# Patient Record
Sex: MALE | Race: WHITE | NOT HISPANIC OR LATINO | Employment: OTHER | URBAN - NONMETROPOLITAN AREA
[De-identification: names, ages, dates, MRNs, and addresses within clinical notes are randomized per-mention and may not be internally consistent; named-entity substitution may affect disease eponyms.]

---

## 2022-04-19 DIAGNOSIS — R13.14 PHARYNGOESOPHAGEAL DYSPHAGIA: Primary | ICD-10-CM

## 2022-04-19 DIAGNOSIS — Z12.11 ENCOUNTER FOR COLONOSCOPY IN PATIENT WITH FAMILY HISTORY OF COLON CANCER: ICD-10-CM

## 2022-04-19 DIAGNOSIS — Z80.0 ENCOUNTER FOR COLONOSCOPY IN PATIENT WITH FAMILY HISTORY OF COLON CANCER: ICD-10-CM

## 2022-04-19 RX ORDER — SODIUM CHLORIDE 9 MG/ML
INJECTION, SOLUTION INTRAVENOUS CONTINUOUS
Status: CANCELLED | OUTPATIENT
Start: 2022-04-19

## 2022-04-19 RX ORDER — LIDOCAINE HYDROCHLORIDE 10 MG/ML
1 INJECTION, SOLUTION EPIDURAL; INFILTRATION; INTRACAUDAL; PERINEURAL ONCE
Status: CANCELLED | OUTPATIENT
Start: 2022-04-19 | End: 2022-04-19

## 2022-04-19 RX ORDER — SODIUM CHLORIDE 0.9 % (FLUSH) 0.9 %
10 SYRINGE (ML) INJECTION
Status: CANCELLED | OUTPATIENT
Start: 2022-04-19

## 2022-04-21 ENCOUNTER — HOSPITAL ENCOUNTER (OUTPATIENT)
Dept: RADIOLOGY | Facility: HOSPITAL | Age: 62
Discharge: HOME | End: 2022-04-21
Attending: SURGERY
Payer: COMMERCIAL

## 2022-04-21 DIAGNOSIS — R13.10 DYSPHAGIA: Primary | ICD-10-CM

## 2022-04-21 DIAGNOSIS — R13.10 DYSPHAGIA: ICD-10-CM

## 2022-04-21 PROCEDURE — A9698 NON-RAD CONTRAST MATERIALNOC: HCPCS | Performed by: SURGERY

## 2022-04-21 PROCEDURE — 74220 X-RAY XM ESOPHAGUS 1CNTRST: CPT | Mod: TC

## 2022-04-21 PROCEDURE — 25500020 PHARM REV CODE 255: Performed by: SURGERY

## 2022-04-21 RX ADMIN — BARIUM SULFATE 176 G: 960 POWDER, FOR SUSPENSION ORAL at 09:04

## 2022-04-21 RX ADMIN — BARIUM SULFATE 135 ML: 980 POWDER, FOR SUSPENSION ORAL at 09:04

## 2022-04-27 NOTE — DISCHARGE INSTRUCTIONS
BEFORE THE PROCEDURE:    REPORT ANY CHANGE IN YOUR PHYSICAL CONDITION TO YOUR DOCTOR IMMEDIATELY.  SELF ISOLATE AND CHECK TEMPERATURE DAILY, IF TEMP OVER 100, CALL PHYSICIAN IMMEDIATELY.  TRY TO REFRAIN FROM SMOKING AND ALCOHOL 72 HOURS BEFORE YOUR PROCEDURE.   DO NOT EAT OR DRINK ANYTHING AFTER MIDNIGHT THE NIGHT BEFORE YOUR PROCEDURE.  NO MAKE UP, NAIL POLISH OR JEWELRY.      SOMEONE WILL CALL YOU THE DAY BEFORE YOUR PROCEDURE WITH A CHECK-IN TIME FOR YOUR PROCEDURE.    DAY OF YOUR PROCEDURE:    TAKE BLOOD PRESSURE MEDICATIONS THE MORNING OF YOUR PROCEDURE, WITH SMALL SIPS WATER, AS DIRECTED BY YOUR PHYSICIAN.   DO NOT TAKE ANY DIABETIC MEDICATIONS UNLESS DIRECTED TO DO SO BY YOUR PHYSICIAN.   CONTACT LENSES AND DENTURES MUST BE REMOVED.  A RESPONSIBLE ADULT MUST ACCOMPANY YOU HOME UPON DISCHARGE.   ONLY 1 VISITOR ALLOWED PER ROOM.     Covid testing Wednesday may 4, 2022 between 8-11 a.m.    YOUR THOUGHTS AND OPINIONS HELP US TO BETTER SERVE YOU.     PLEASE PARTICIPATE IN SURVEYS ABOUT YOUR CARE.    THANK YOU FOR CHOOSING [facility].

## 2022-04-28 ENCOUNTER — HOSPITAL ENCOUNTER (OUTPATIENT)
Dept: PREADMISSION TESTING | Facility: HOSPITAL | Age: 62
Discharge: HOME | End: 2022-04-28
Attending: SURGERY
Payer: COMMERCIAL

## 2022-04-28 ENCOUNTER — LAB VISIT (OUTPATIENT)
Dept: LAB | Facility: HOSPITAL | Age: 62
End: 2022-04-28
Attending: SURGERY
Payer: COMMERCIAL

## 2022-04-28 ENCOUNTER — HOSPITAL ENCOUNTER (OUTPATIENT)
Dept: PULMONOLOGY | Facility: HOSPITAL | Age: 62
Discharge: HOME | End: 2022-04-28
Attending: SURGERY
Payer: COMMERCIAL

## 2022-04-28 VITALS — WEIGHT: 290 LBS | HEIGHT: 73 IN | BODY MASS INDEX: 38.43 KG/M2

## 2022-04-28 DIAGNOSIS — Z80.0 ENCOUNTER FOR COLONOSCOPY IN PATIENT WITH FAMILY HISTORY OF COLON CANCER: ICD-10-CM

## 2022-04-28 DIAGNOSIS — Z12.11 ENCOUNTER FOR COLONOSCOPY IN PATIENT WITH FAMILY HISTORY OF COLON CANCER: ICD-10-CM

## 2022-04-28 DIAGNOSIS — R13.14 PHARYNGOESOPHAGEAL DYSPHAGIA: ICD-10-CM

## 2022-04-28 DIAGNOSIS — Z01.818 PRE-OP TESTING: ICD-10-CM

## 2022-04-28 LAB
ALBUMIN SERPL BCP-MCNC: 3.2 G/DL (ref 3.5–5.2)
ALP SERPL-CCNC: 92 U/L (ref 55–135)
ALT SERPL W/O P-5'-P-CCNC: 25 U/L (ref 10–44)
ANION GAP SERPL CALC-SCNC: 8 MMOL/L (ref 8–16)
AST SERPL-CCNC: 22 U/L (ref 10–40)
BASOPHILS # BLD AUTO: 0.06 K/UL (ref 0–0.2)
BASOPHILS NFR BLD: 0.8 % (ref 0–1.9)
BILIRUB SERPL-MCNC: 0.5 MG/DL (ref 0.1–1)
BUN SERPL-MCNC: 10 MG/DL (ref 8–23)
CALCIUM SERPL-MCNC: 9.2 MG/DL (ref 8.7–10.5)
CHLORIDE SERPL-SCNC: 103 MMOL/L (ref 95–110)
CO2 SERPL-SCNC: 28 MMOL/L (ref 23–29)
CREAT SERPL-MCNC: 0.9 MG/DL (ref 0.5–1.4)
DIFFERENTIAL METHOD: NORMAL
EOSINOPHIL # BLD AUTO: 0.4 K/UL (ref 0–0.5)
EOSINOPHIL NFR BLD: 5.1 % (ref 0–8)
ERYTHROCYTE [DISTWIDTH] IN BLOOD BY AUTOMATED COUNT: 13.2 % (ref 11.5–14.5)
EST. GFR  (AFRICAN AMERICAN): >60 ML/MIN/1.73 M^2
EST. GFR  (NON AFRICAN AMERICAN): >60 ML/MIN/1.73 M^2
GLUCOSE SERPL-MCNC: 185 MG/DL (ref 70–110)
HCT VFR BLD AUTO: 41.9 % (ref 40–54)
HGB BLD-MCNC: 14.1 G/DL (ref 14–18)
IMM GRANULOCYTES # BLD AUTO: 0.02 K/UL (ref 0–0.04)
IMM GRANULOCYTES NFR BLD AUTO: 0.3 % (ref 0–0.5)
LYMPHOCYTES # BLD AUTO: 3.2 K/UL (ref 1–4.8)
LYMPHOCYTES NFR BLD: 41.1 % (ref 18–48)
MCH RBC QN AUTO: 30.1 PG (ref 27–31)
MCHC RBC AUTO-ENTMCNC: 33.7 G/DL (ref 32–36)
MCV RBC AUTO: 90 FL (ref 82–98)
MONOCYTES # BLD AUTO: 0.7 K/UL (ref 0.3–1)
MONOCYTES NFR BLD: 8.5 % (ref 4–15)
NEUTROPHILS # BLD AUTO: 3.4 K/UL (ref 1.8–7.7)
NEUTROPHILS NFR BLD: 44.2 % (ref 38–73)
NRBC BLD-RTO: 0 /100 WBC
PLATELET # BLD AUTO: 257 K/UL (ref 150–450)
PMV BLD AUTO: 9.4 FL (ref 9.2–12.9)
POTASSIUM SERPL-SCNC: 3.9 MMOL/L (ref 3.5–5.1)
PROT SERPL-MCNC: 7.4 G/DL (ref 6–8.4)
RBC # BLD AUTO: 4.68 M/UL (ref 4.6–6.2)
SODIUM SERPL-SCNC: 139 MMOL/L (ref 136–145)
WBC # BLD AUTO: 7.69 K/UL (ref 3.9–12.7)

## 2022-04-28 PROCEDURE — 36415 COLL VENOUS BLD VENIPUNCTURE: CPT | Performed by: SURGERY

## 2022-04-28 PROCEDURE — 85025 COMPLETE CBC W/AUTO DIFF WBC: CPT | Performed by: SURGERY

## 2022-04-28 PROCEDURE — 93010 ELECTROCARDIOGRAM REPORT: CPT | Mod: ,,, | Performed by: INTERNAL MEDICINE

## 2022-04-28 PROCEDURE — 93010 EKG 12-LEAD: ICD-10-PCS | Mod: ,,, | Performed by: INTERNAL MEDICINE

## 2022-04-28 PROCEDURE — 93005 ELECTROCARDIOGRAM TRACING: CPT

## 2022-04-28 PROCEDURE — 80053 COMPREHEN METABOLIC PANEL: CPT | Performed by: SURGERY

## 2022-04-28 RX ORDER — LEVOTHYROXINE SODIUM 100 UG/1
TABLET ORAL
COMMUNITY
Start: 2022-04-19 | End: 2023-01-23 | Stop reason: SDUPTHER

## 2022-04-28 RX ORDER — ZOLPIDEM TARTRATE 10 MG/1
10 TABLET ORAL NIGHTLY
COMMUNITY
Start: 2022-04-25 | End: 2023-08-31

## 2022-04-28 RX ORDER — DESOXIMETASONE 2.5 MG/G
CREAM TOPICAL
COMMUNITY
Start: 2021-11-16 | End: 2023-06-05

## 2022-04-28 RX ORDER — POTASSIUM CHLORIDE 750 MG/1
TABLET, EXTENDED RELEASE ORAL
COMMUNITY
Start: 2022-04-19 | End: 2023-03-15 | Stop reason: SDUPTHER

## 2022-04-28 RX ORDER — METFORMIN HYDROCHLORIDE 1000 MG/1
1000 TABLET ORAL 2 TIMES DAILY
COMMUNITY
Start: 2022-04-19 | End: 2023-03-06 | Stop reason: SDUPTHER

## 2022-04-28 RX ORDER — FLUOXETINE HYDROCHLORIDE 20 MG/1
CAPSULE ORAL
COMMUNITY
Start: 2022-04-19 | End: 2023-04-25 | Stop reason: SDUPTHER

## 2022-04-28 RX ORDER — ERGOCALCIFEROL (VITAMIN D2) 10 MCG
25 TABLET ORAL
Status: ON HOLD | COMMUNITY
End: 2022-05-05 | Stop reason: SDUPTHER

## 2022-04-28 RX ORDER — LISINOPRIL AND HYDROCHLOROTHIAZIDE 20; 25 MG/1; MG/1
TABLET ORAL
COMMUNITY
Start: 2022-04-19 | End: 2023-04-25 | Stop reason: SDUPTHER

## 2022-04-28 RX ORDER — ASPIRIN 81 MG/1
81 TABLET ORAL DAILY
COMMUNITY
End: 2023-06-05

## 2022-04-28 RX ORDER — PIOGLITAZONEHYDROCHLORIDE 15 MG/1
TABLET ORAL
COMMUNITY
Start: 2022-04-19 | End: 2023-04-17 | Stop reason: HOSPADM

## 2022-04-28 RX ORDER — ORAL SEMAGLUTIDE 3 MG/1
3 TABLET ORAL
Status: ON HOLD | COMMUNITY
End: 2022-05-05 | Stop reason: CLARIF

## 2022-04-28 RX ORDER — OMEPRAZOLE 40 MG/1
CAPSULE, DELAYED RELEASE ORAL
COMMUNITY
Start: 2022-04-19 | End: 2022-05-05

## 2022-04-28 RX ORDER — FUROSEMIDE 20 MG/1
TABLET ORAL
COMMUNITY
Start: 2022-04-19 | End: 2023-01-16

## 2022-04-28 RX ORDER — GLIMEPIRIDE 4 MG/1
4 TABLET ORAL DAILY
COMMUNITY
Start: 2022-04-19 | End: 2023-03-06 | Stop reason: SDUPTHER

## 2022-04-28 RX ORDER — AMLODIPINE BESYLATE 5 MG/1
TABLET ORAL
COMMUNITY
Start: 2022-04-19 | End: 2023-04-17

## 2022-05-01 PROBLEM — R13.10 DYSPHAGIA: Chronic | Status: ACTIVE | Noted: 2022-04-01

## 2022-05-01 NOTE — H&P
"[place] - Endoscopy  General Surgery  History & Physical    Patient Name: Eren Page  MRN: [id number]  Admission Date: (Not on file)  Attending Physician: Huan Newsome MD   Primary Care Provider: Marilee Merlos MD    Patient information was obtained from patient and past medical records.     Subjective:     Chief Complaint/Reason for Admission:  Dysphagia and longstanding GERD plus family history of colon cancer for screening colonoscopy    History of Present Illness:  Patient is a 62 y.o. male who originally was because of dysphagia and a longstanding history of GERD.  An esophagram was done that showed mild laryngeal penetration of the contrast but no aspiration.  The patient is now admitted for an EGD for evaluation of the esophagus.  He also has a family history of colon cancer and is due for a colonoscopy.  The last colonoscopy he had was 6 years ago.  It was benign.    No current facility-administered medications on file prior to encounter.     No current outpatient medications on file prior to encounter.       Review of patient's allergies indicates:  No Known Allergies    Past Medical History:   Diagnosis Date    Claudication     Diabetes mellitus     Dizziness     Dysphagia 04/2022    Family history of colon cancer     DAD    History of chest pain     Hypertension     Palpitations     Sleep apnea     CPAP USED    SOB (shortness of breath)     Thyroid disease     Wears dentures     FULL     Past Surgical History:   Procedure Laterality Date    APPENDECTOMY      BACK SURGERY      "20 YRS AGO-LOWER BACK" 2 RODS AND 20 SCREWS     CHOLECYSTOTOMY      TONSILLECTOMY      TOTAL KNEE ARTHROPLASTY Bilateral     VASECTOMY       Family History     Problem Relation (Age of Onset)    Cancer Sister, Sister    Cervical cancer Mother    Colon cancer Father    Heart attack Father (82)    Heart disease Mother (76)    No Known Problems Brother, Brother, Brother, Brother, Brother        Tobacco Use    " Smoking status: Never Smoker    Smokeless tobacco: Never Used   Substance and Sexual Activity    Alcohol use: Yes     Comment: OCC    Drug use: Never    Sexual activity: Not on file     Review of Systems   Gastrointestinal: Negative for abdominal distention, abdominal pain and anal bleeding.        Complains of dysphagia   All other systems reviewed and are negative.    Objective:     Vital Signs (Most Recent):    Vital Signs (24h Range):           There is no height or weight on file to calculate BMI.    Physical Exam  Constitutional:       Appearance: Normal appearance.   HENT:      Head: Normocephalic.      Nose: Nose normal.      Mouth/Throat:      Mouth: Mucous membranes are moist.   Eyes:      Extraocular Movements: Extraocular movements intact.   Cardiovascular:      Rate and Rhythm: Normal rate and regular rhythm.   Pulmonary:      Effort: Pulmonary effort is normal.      Breath sounds: Normal breath sounds.   Abdominal:      General: Abdomen is flat. There is no distension.      Palpations: Abdomen is soft. There is no mass.      Tenderness: There is no abdominal tenderness.   Musculoskeletal:         General: Normal range of motion.      Cervical back: Neck supple.   Lymphadenopathy:      Cervical: No cervical adenopathy.   Skin:     General: Skin is warm and dry.      Coloration: Skin is not jaundiced.   Neurological:      General: No focal deficit present.      Mental Status: He is alert and oriented to person, place, and time.   Psychiatric:         Mood and Affect: Mood normal.         Behavior: Behavior normal.         Significant Labs:  I have reviewed all pertinent lab results within the past 24 hours.      Significant Diagnostics:  I have reviewed all pertinent imaging results/findings within the past 24 hours.    Assessment/Plan:     Active Diagnoses:    Diagnosis Date Noted POA    PRINCIPAL PROBLEM:  Family history of colon cancer [Z80.0]  Not Applicable    Dysphagia [R13.10] 04/2022 Yes      Chronic      Problems Resolved During this Admission:     VTE Risk Mitigation (From admission, onward)    None          Huan Newsome MD  General Surgery  [facility]

## 2022-05-03 ENCOUNTER — ANESTHESIA EVENT (OUTPATIENT)
Dept: ENDOSCOPY | Facility: HOSPITAL | Age: 62
End: 2022-05-03
Payer: COMMERCIAL

## 2022-05-03 NOTE — ANESTHESIA PREPROCEDURE EVALUATION
05/03/2022  Eren Page is a 62 y.o., male.      Pre-op Assessment    I have reviewed the Patient Summary Reports.    I have reviewed the NPO Status.   I have reviewed the Medications.     Review of Systems  Anesthesia Hx:  No problems with previous Anesthesia  Denies Family Hx of Anesthesia complications.   Denies Personal Hx of Anesthesia complications.   Social:  Non-Smoker    Cardiovascular:   Hypertension, well controlled ECG has been reviewed. CHEERAN NOTE   Pulmonary:   Shortness of breath Sleep Apnea, CPAP    Education provided regarding risk of obstructive sleep apnea     Renal/:  Renal/ Normal     Hepatic/GI:  Hepatic/GI Normal    Neurological:  Neurology Normal    Endocrine:   Diabetes, well controlled, type 2      Lab Results   Component Value Date    WBC 7.69 04/28/2022    HGB 14.1 04/28/2022    HCT 41.9 04/28/2022    MCV 90 04/28/2022     04/28/2022     CMP  Sodium   Date Value Ref Range Status   04/28/2022 139 136 - 145 mmol/L Final     Potassium   Date Value Ref Range Status   04/28/2022 3.9 3.5 - 5.1 mmol/L Final     Chloride   Date Value Ref Range Status   04/28/2022 103 95 - 110 mmol/L Final     CO2   Date Value Ref Range Status   04/28/2022 28 23 - 29 mmol/L Final     Glucose   Date Value Ref Range Status   04/28/2022 185 (H) 70 - 110 mg/dL Final     BUN   Date Value Ref Range Status   04/28/2022 10 8 - 23 mg/dL Final     Creatinine   Date Value Ref Range Status   04/28/2022 0.9 0.5 - 1.4 mg/dL Final     Calcium   Date Value Ref Range Status   04/28/2022 9.2 8.7 - 10.5 mg/dL Final     Total Protein   Date Value Ref Range Status   04/28/2022 7.4 6.0 - 8.4 g/dL Final     Albumin   Date Value Ref Range Status   04/28/2022 3.2 (L) 3.5 - 5.2 g/dL Final     Total Bilirubin   Date Value Ref Range Status   04/28/2022 0.5 0.1 - 1.0 mg/dL Final     Comment:     For infants and newborns,  interpretation of results should be based  on gestational age, weight and in agreement with clinical  observations.    Premature Infant recommended reference ranges:  Up to 24 hours.............<8.0 mg/dL  Up to 48 hours............<12.0 mg/dL  3-5 days..................<15.0 mg/dL  6-29 days.................<15.0 mg/dL    For patients on Eltrombopag therapy, use of Dimension [place] TBIL is   not   recommended.       Alkaline Phosphatase   Date Value Ref Range Status   04/28/2022 92 55 - 135 U/L Final     AST   Date Value Ref Range Status   04/28/2022 22 10 - 40 U/L Final     ALT   Date Value Ref Range Status   04/28/2022 25 10 - 44 U/L Final     Anion Gap   Date Value Ref Range Status   04/28/2022 8 8 - 16 mmol/L Final     eGFR if    Date Value Ref Range Status   04/28/2022 >60.0 >60 mL/min/1.73 m^2 Final     eGFR if non    Date Value Ref Range Status   04/28/2022 >60.0 >60 mL/min/1.73 m^2 Final     Comment:     Calculation used to obtain the estimated glomerular filtration  rate (eGFR) is the CKD-EPI equation.          Physical Exam  General: Well nourished    Airway:  Mallampati: III / III  Mouth Opening: Normal  TM Distance: Normal  Tongue: Normal  Neck ROM: Normal ROM    Dental:  Partial Dentures    Chest/Lungs:  Clear to auscultation    Heart:  Rate: Normal  Rhythm: Regular Rhythm  Sounds: Normal        Anesthesia Plan  Type of Anesthesia, risks & benefits discussed:    Anesthesia Type: MAC  Intra-op Monitoring Plan: Standard ASA Monitors  Post Op Pain Control Plan: multimodal analgesia  Induction:  IV  Airway Plan: Direct  Informed Consent: Informed consent signed with the Patient and all parties understand the risks and agree with anesthesia plan.  All questions answered.   ASA Score: 4  Day of Surgery Review of History & Physical: I have interviewed and examined the patient. I have reviewed the patient's H&P dated: There are no significant changes.     Ready For Surgery From  Anesthesia Perspective.     .

## 2022-05-04 ENCOUNTER — LAB VISIT (OUTPATIENT)
Dept: LAB | Facility: HOSPITAL | Age: 62
End: 2022-05-04
Attending: SURGERY
Payer: COMMERCIAL

## 2022-05-04 DIAGNOSIS — Z01.818 PRE-OP TESTING: ICD-10-CM

## 2022-05-04 LAB — SARS-COV-2 RNA RESP QL NAA+PROBE: NOT DETECTED

## 2022-05-04 PROCEDURE — U0002 COVID-19 LAB TEST NON-CDC: HCPCS | Performed by: SURGERY

## 2022-05-05 ENCOUNTER — ANESTHESIA (OUTPATIENT)
Dept: ENDOSCOPY | Facility: HOSPITAL | Age: 62
End: 2022-05-05
Payer: COMMERCIAL

## 2022-05-05 ENCOUNTER — HOSPITAL ENCOUNTER (OUTPATIENT)
Facility: HOSPITAL | Age: 62
Discharge: HOME | End: 2022-05-05
Attending: SURGERY | Admitting: SURGERY
Payer: COMMERCIAL

## 2022-05-05 DIAGNOSIS — Z80.0 FAMILY HISTORY OF COLON CANCER: Primary | ICD-10-CM

## 2022-05-05 DIAGNOSIS — K29.00 ACUTE SUPERFICIAL GASTRITIS WITHOUT HEMORRHAGE: ICD-10-CM

## 2022-05-05 DIAGNOSIS — D12.4 ADENOMATOUS POLYP OF DESCENDING COLON: ICD-10-CM

## 2022-05-05 DIAGNOSIS — R13.14 PHARYNGOESOPHAGEAL DYSPHAGIA: ICD-10-CM

## 2022-05-05 PROCEDURE — 25000003 PHARM REV CODE 250: Performed by: SURGERY

## 2022-05-05 PROCEDURE — 87081 CULTURE SCREEN ONLY: CPT | Performed by: SURGERY

## 2022-05-05 PROCEDURE — 37000008 HC ANESTHESIA 1ST 15 MINUTES: Performed by: SURGERY

## 2022-05-05 PROCEDURE — 37000009 HC ANESTHESIA EA ADD 15 MINS: Performed by: SURGERY

## 2022-05-05 PROCEDURE — 45385 COLONOSCOPY W/LESION REMOVAL: CPT | Mod: PT | Performed by: SURGERY

## 2022-05-05 PROCEDURE — 43239 EGD BIOPSY SINGLE/MULTIPLE: CPT | Performed by: SURGERY

## 2022-05-05 PROCEDURE — 27201423 OPTIME MED/SURG SUP & DEVICES STERILE SUPPLY: Performed by: SURGERY

## 2022-05-05 RX ORDER — ERGOCALCIFEROL (VITAMIN D2) 10 MCG
10 TABLET ORAL DAILY
Qty: 30 TABLET | Refills: 12 | Status: SHIPPED | OUTPATIENT
Start: 2022-05-05 | End: 2023-06-12

## 2022-05-05 RX ORDER — DEXTROMETHORPHAN/PSEUDOEPHED 2.5-7.5/.8
DROPS ORAL
Status: COMPLETED | OUTPATIENT
Start: 2022-05-05 | End: 2022-05-05

## 2022-05-05 RX ORDER — LIDOCAINE HYDROCHLORIDE 10 MG/ML
INJECTION, SOLUTION INTRAVENOUS
Status: DISCONTINUED | OUTPATIENT
Start: 2022-05-05 | End: 2022-05-05

## 2022-05-05 RX ORDER — SODIUM CHLORIDE 9 MG/ML
INJECTION, SOLUTION INTRAVENOUS CONTINUOUS
Status: DISCONTINUED | OUTPATIENT
Start: 2022-05-05 | End: 2022-05-05 | Stop reason: HOSPADM

## 2022-05-05 RX ORDER — PROPOFOL 10 MG/ML
VIAL (ML) INTRAVENOUS
Status: DISCONTINUED | OUTPATIENT
Start: 2022-05-05 | End: 2022-05-05

## 2022-05-05 RX ORDER — LIDOCAINE HYDROCHLORIDE 10 MG/ML
1 INJECTION, SOLUTION EPIDURAL; INFILTRATION; INTRACAUDAL; PERINEURAL ONCE
Status: DISCONTINUED | OUTPATIENT
Start: 2022-05-05 | End: 2022-05-05 | Stop reason: HOSPADM

## 2022-05-05 RX ORDER — PANTOPRAZOLE SODIUM 40 MG/1
40 TABLET, DELAYED RELEASE ORAL 2 TIMES DAILY
Qty: 60 TABLET | Refills: 11 | Status: SHIPPED | OUTPATIENT
Start: 2022-05-05 | End: 2023-12-04 | Stop reason: SDUPTHER

## 2022-05-05 RX ORDER — SODIUM CHLORIDE 0.9 % (FLUSH) 0.9 %
10 SYRINGE (ML) INJECTION
Status: DISCONTINUED | OUTPATIENT
Start: 2022-05-05 | End: 2022-05-05 | Stop reason: HOSPADM

## 2022-05-05 RX ADMIN — Medication 40 MG: at 08:05

## 2022-05-05 RX ADMIN — Medication 30 MG: at 08:05

## 2022-05-05 RX ADMIN — Medication 50 MG: at 08:05

## 2022-05-05 RX ADMIN — TOPICAL ANESTHETIC 2 EACH: 200 SPRAY DENTAL; PERIODONTAL at 07:05

## 2022-05-05 RX ADMIN — Medication 120 MG: at 08:05

## 2022-05-05 RX ADMIN — SIMETHICONE 80 MG: 20 SUSPENSION/ DROPS ORAL at 08:05

## 2022-05-05 RX ADMIN — SODIUM CHLORIDE: 0.9 INJECTION, SOLUTION INTRAVENOUS at 06:05

## 2022-05-05 RX ADMIN — LIDOCAINE HYDROCHLORIDE 50 MG: 10 INJECTION, SOLUTION INTRAVENOUS at 08:05

## 2022-05-05 NOTE — PLAN OF CARE
RECEIVED PT TO ROOM 521 ACCOMPANIED BY MARSHAL HUERTA, SNACK SERVED. WILL AT BEDSIDE. CALL WITH NEEDS.

## 2022-05-05 NOTE — OP NOTE
[facility] - Endoscopy  Colonoscopy Procedure  Operative Note    SUMMARY     Date of Procedure: 5/5/2022     Procedure: Procedure(s) (LRB):  COLONOSCOPY, WITH POLYPECTOMY USING SNARE (N/A)  EGD, WITH CLOSED BIOPSY (N/A)    Surgeon(s) and Role:     * Huan Newsome MD - Primary    Assisting Surgeon: None     Patient location: PACU    Pre-Operative Diagnosis: Pharyngoesophageal dysphagia [R13.14]  Encounter for colonoscopy in patient with family history of colon cancer [Z12.11, Z80.0]    Post-Operative Diagnosis: Post-Op Diagnosis Codes:     * Pharyngoesophageal dysphagia [R13.14]     * Encounter for colonoscopy in patient with family history of colon cancer [Z12.11, Z80.0]     * Acute gastritis without hemorrhage [K29.00]     * Polyp of descending colon [K63.5]     Indications:  Screening colonoscopy with family history of colon cancer and pharyngoesophageal dysphagia     Anesthesia:  Mac        Procedure:                  Once the patient was turned around, digital rectal exam was performed.  Under direct visualization the colonoscope was introduced through the anus in to the rectum.  The scope was then advanced to the terminal ileum.  Scope was then withdrawn and the mucosa was carefully examined in a circular fashion.  The entire colonic mucosa was examined, including the rectum with retroflexion.  Air was evacuated from the colon and the procedure was terminated.  The patient tolerated the procedure well and was able to be transferred to the recovery area in stable condition.    Findings:                 Digital rectal examination:  Rectal exam showed normal sphincter tone with no masses palpated.  There was no blood on the glove.                      Rectum:  Rectal mucosa appeared normal                    Sigmoid:  Sigmoid colon showed no abnormalities        Descending:  In the descending colon at 45 cm, small polyp was identified and removed via snare cautery.         Transverse:  Transverse colon was normal          Ascending:  Ascending colon showed no abnormalities        Cecum:  The cecum mucosa appeared unremarkable.  Ileocecal valve and appendiceal orifice were normal.  We entered into the terminal ileum.        Terminal Ileum:  Terminal ileum mucosa was normal     Specimens:   Specimens (From admission, onward)    None            Estimated Blood Loss (EBL): * No values recorded between 5/5/2022 12:00 AM and 5/5/2022  8:59 AM *     Complications: No     Diagnostic Impression:  1. Pharyngo esophageal dysphagia 2. Acute gastritis without bleeding 3. Screening colonoscopy due to family history of colon cancer 4. Polyp at 45 cm in the descending colon      Recommendations: Discharge patient to home.    Disposition: PACU - hemodynamically stable.     Attestation: I performed the procedure.        Follow Up:             No future appointments.        Huan Newsome MD  5/5/2022

## 2022-05-05 NOTE — DISCHARGE SUMMARY
[facility] - Endoscopy  Discharge Note  Short Stay    Procedure(s) (LRB):  COLONOSCOPY, WITH POLYPECTOMY USING SNARE (N/A)  EGD, WITH CLOSED BIOPSY (N/A)    OUTCOME: Patient tolerated treatment/procedure well without complication and is now ready for discharge.    DISPOSITION: Home or Self Care    FINAL DIAGNOSIS:  1.  Family history of colon cancer 2. pharyngoesophageal dysphagia 3. Acute gastritis without hemorrhage 4. Colon polyp in descending colon at 45 cm    FOLLOWUP: Patient is to follow up in clinic in 2 weeks    DISCHARGE INSTRUCTIONS:    Discharge Procedure Orders   Diet Adult Regular   Order Comments: As preop     Notify your health care provider if you experience any of the following:  temperature >100.4     Notify your health care provider if you experience any of the following:  persistent nausea and vomiting or diarrhea     Notify your health care provider if you experience any of the following:  severe uncontrolled pain     Activity as tolerated         Clinical Reference Documents Added to Patient Instructions       Document    COLON POLYPECTOMY DISCHARGE INSTRUCTIONS (ENGLISH)    GASTRITIS DISCHARGE INSTRUCTIONS (ENGLISH)          TIME SPENT ON DISCHARGE:  15 minutes

## 2022-05-05 NOTE — OP NOTE
[facility] - Endoscopy  EGD Procedure  Operative Note    SUMMARY     Date of Procedure: 5/5/2022     Procedure: Procedure(s) (LRB):  COLONOSCOPY, WITH POLYPECTOMY USING SNARE (N/A)  EGD, WITH CLOSED BIOPSY (N/A)    Surgeon(s) and Role:     * Huan Newsome MD - Primary    Assisting Surgeon: None    Patient location: PACU    Pre-Operative Diagnosis: Pharyngoesophageal dysphagia [R13.14]  Encounter for colonoscopy in patient with family history of colon cancer [Z12.11, Z80.0]    Post-Operative Diagnosis: Post-Op Diagnosis Codes:     * Pharyngoesophageal dysphagia [R13.14]     * Encounter for colonoscopy in patient with family history of colon cancer [Z12.11, Z80.0]     * Acute gastritis without hemorrhage [K29.00]     * Polyp of descending colon [K63.5]     Indications: Pharyngoesophageal dysphagia with normal esophagram     ASA Score: IV    Procedure:                  The patient was brought in to the endoscopy suite where the risks, benefits, and alternatives of the procedure were described.  The patient was given the opportunity to ask questions and then signed informed consent. Patient was positioned in the left lateral decubitus position, continuous monitoring was initiated, and supplemental oxygen was provided via nasal cannula.  Bite block was placed.  Adequate sedation was achieved with the above mentioned medications and then titrated during the entire procedure.  Under direct visualization the gastroscope was introduced through the oropharynx in to the esophagus.  The scope was then advanced in to the stomach and second portion of the duodenum.  Scope was then withdrawn and the mucosa was carefully examined.  The entire gastric mucosa was examined, including the fundus with retroflexion.  Air was evacuated from the stomach and the scope was withdrawn in to the esophagus.  The entire esophageal mucosa was examined.  Once the scope was removed and the patient was turned around and we proceeded with the  colonoscopy.    Findings:                 Esophagus:  Upon insertion of the scope the cords were seen and they appeared unremarkable.  Going down the esophagus no abnormalities were seen.  The Z-line was sharply delineated at 45 cm.                      Stomach:  Upon entering the stomach we identified severe diffuse gastritis from the incisura down to the pylorus.  There was no evidence of ulcerations or erosions.  A retroflexed view of the cardia showed no proximal gastric abnormalities.  The scope was then straightened now and gastric biopsy of the antrum was done for H pylori using cold got biopsy forceps.                    Duodenum:  The duodenal mucosa appeared unremarkable.     Specimens:   Specimen (24h ago, onward)             Start     Ordered    05/05/22 0853  Specimen to Pathology, Surgery Gastrointestinal tract  Once        Comments: Pre-op Diagnosis: Pharyngoesophageal dysphagia [R13.14]Encounter for colonoscopy in patient with family history of colon cancer [Z12.11, Z80.0]Procedure(s):COLONOSCOPY, WITH POLYPECTOMY USING SNAREEGD, WITH CLOSED BIOPSY Number of specimens: 1Name of specimens: 1)   POLYP AT 45 CM  @ 0849     References:    Click here for ordering Quick Tip   Question Answer Comment   Procedure Type: Gastrointestinal tract    Specimen Class: Complex case/Special    Which provider would you like to cc? HUAN NEWSOME    Release to patient Immediate        05/05/22 0857                  Estimated Blood Loss (EBL): * No values recorded between 5/5/2022 12:00 AM and 5/5/2022  8:55 AM *     Complications: No     Diagnostic Impression:  1. Pharyngoesophageal dysphagia 2. Acute severe gastritis without hemorrhage      Recommendations: Discharge patient to home.    Disposition: PACU - hemodynamically stable.     Attestation: I performed the procedure.        Follow Up:             No future appointments.        Huan Newsome MD  5/5/2022

## 2022-05-05 NOTE — DISCHARGE INSTRUCTIONS
FOLLOW UP WITH DR BARBA IN 2 WEEKS.  *APPOINTMENT MAY 19th @ 2:30pm    NO DRIVING OR DRINKING ALCOHOL FOR 24 HOURS.    CALL DR BARBA'S OFFICE FOR ANY QUESTIONS OR CONCERNS.  REPORT TO THE ER IF URGENT.    THANK YOU FOR CHOOSING [facility]!

## 2022-05-05 NOTE — ANESTHESIA POSTPROCEDURE EVALUATION
Anesthesia Post Evaluation    Patient: Eren Page    Procedure(s) Performed: Procedure(s) (LRB):  COLONOSCOPY, WITH POLYPECTOMY USING SNARE (N/A)  EGD, WITH CLOSED BIOPSY (N/A)    Final Anesthesia Type: MAC      Patient location during evaluation: OPS  Patient participation: Yes- Able to Participate  Level of consciousness: awake  Post-procedure vital signs: reviewed and stable  Pain management: adequate  Airway patency: patent    PONV status at discharge: No PONV  Anesthetic complications: no      Cardiovascular status: blood pressure returned to baseline  Respiratory status: spontaneous ventilation  Hydration status: euvolemic  Follow-up not needed.          Vitals Value Taken Time   /68 05/05/22 0931   Temp 36.4 °C (97.5 °F) 05/05/22 0931   Pulse 62 05/05/22 0931   Resp 20 05/05/22 0931   SpO2 98 % 05/05/22 0931         No case tracking events are documented in the log.      Pain/Sharifa Score: Sharifa Score: 10 (5/5/2022  9:31 AM)

## 2022-05-05 NOTE — TRANSFER OF CARE
Anesthesia Transfer of Care Note    Patient: Eren Page    Procedure(s) Performed: Procedure(s) (LRB):  COLONOSCOPY, WITH POLYPECTOMY USING SNARE (N/A)  EGD, WITH CLOSED BIOPSY (N/A)    Patient location: GI    Anesthesia Type: MAC    Transport from OR: Transported from OR on room air with adequate spontaneous ventilation    Post pain: adequate analgesia    Post assessment: no apparent anesthetic complications    Post vital signs: stable    Level of consciousness: awake    Nausea/Vomiting: no nausea/vomiting    Complications: none    Transfer of care protocol was followed      Last vitals:   /64  HR 79  RR 16  SPO2 99  T 36.7

## 2022-05-06 VITALS
DIASTOLIC BLOOD PRESSURE: 68 MMHG | TEMPERATURE: 98 F | HEART RATE: 62 BPM | RESPIRATION RATE: 20 BRPM | SYSTOLIC BLOOD PRESSURE: 133 MMHG | OXYGEN SATURATION: 98 %

## 2022-05-06 LAB — HELICOBACTER, RAPID UREA: NEGATIVE

## 2022-05-09 LAB — POCT GLUCOSE: 201 MG/DL (ref 70–110)

## 2022-05-12 LAB — SPECIMEN TO PATHOLOGY - SURGICAL: NORMAL

## 2023-04-19 DIAGNOSIS — M54.16 LUMBAR RADICULOPATHY: Primary | ICD-10-CM

## 2023-04-21 ENCOUNTER — TELEPHONE (OUTPATIENT)
Dept: NEUROSURGERY | Facility: CLINIC | Age: 63
End: 2023-04-21
Payer: COMMERCIAL

## 2023-04-21 NOTE — TELEPHONE ENCOUNTER
There was an error on my part between this patient and another patient with the same last name as well as other similarities in needs of care; I'm typing the correct message here for proper documentation. I'm advising Nadia that this pt will be a new pt that Dr. Barclay came and talked to me about; I already uploaded one MRI Dr. Barclay brought me that the pt's daughter had given to him for review. Dr. Barclay would like pt to get an MRI lumbar w/o, XR cervical and XR lumbar both with flex & ext, but due to patient's insurance we cannot order the MRI due to pt not having been seen yet by our clinic. Nadia, can you please contact either the patient or patient's PCP in regards to getting those images ordered by a PCP or another physician he currently is seeing that knows what his symptoms are and then let me know once we have those images for Dr. Barclay to review. Thanks!    This is the original message that should've been documented into patient's chart.

## 2023-04-21 NOTE — TELEPHONE ENCOUNTER
Received referral. Called patient to inquire about any recent imaging he has had. He stated he did have xrays and a MRI done this year. States his daughter gave it to Dr. Barclay to review. I spoke with Valeriano, his daughter. She states she did give the discs/reports to Dr. Barclay to review. She is in surgery right now and will check with Dr. Barclay. I got with sepideh to see if she knew anything about this and Dr. Barclay did give her images to put in. Importing now.

## 2023-04-21 NOTE — TELEPHONE ENCOUNTER
"Spoke with patient. He is C/O lower back pain that radiates down to his right hip, into his knees. He states he has "dead spots" in his left leg. He has no feeling outside of his left leg/bottom of his foot. This began to happen after his second back surgery. He cannot put any weight on that leg. He states it feels like someone is "stabbing him with a knife" in her right hip. This has been going on for about 5-6 months now and is not due to any accidents/falls. The pain does wake him up at night. He cannot lay on his back nor his shoulders so he is constantly moving around, switching positions. States if he cannot find a right position, he gets up in the middle of the night and goes to his recliner. He rates the pain a 9/10 and is taking Norco, a muscle relaxer and NSAID prescribed by Dr. Granados. Patient has had two back surgeries. The first one he cannot recall who performed it and the second one was done in [place] by Dr. Jamaal Bronson but believes he switched practices. I will call Dr. An's office and advise on what AA is recommending.  "

## 2023-04-24 NOTE — TELEPHONE ENCOUNTER
Deisy from Dr. An's office called me back. I advised her that we did receive referral and advised her of what Dr. Barclay was requesting. I told her the MRI lumbar w/o and nba said we can order the xrays so they will see about ordering MRI. Will send myself a reminder to f/u on scheduling.

## 2023-04-25 NOTE — TELEPHONE ENCOUNTER
Looks like MRI is scheduled for today. Sending myself a reminder to f/u on results and send to AA for review

## 2023-05-10 DIAGNOSIS — M54.12 CERVICAL RADICULOPATHY: ICD-10-CM

## 2023-05-10 DIAGNOSIS — M54.16 LUMBAR RADICULOPATHY: Primary | ICD-10-CM

## 2023-05-10 NOTE — TELEPHONE ENCOUNTER
"Per Dr. Barclay:   "He will need L2-3, L3-4 decompression and R L2-3 microdiscectomy." Can you please schedule or let me know where to schedule? Thanks!   "

## 2023-05-15 ENCOUNTER — HOSPITAL ENCOUNTER (OUTPATIENT)
Dept: RADIOLOGY | Facility: HOSPITAL | Age: 63
Discharge: HOME | End: 2023-05-15
Attending: NEUROLOGICAL SURGERY
Payer: COMMERCIAL

## 2023-05-15 ENCOUNTER — OFFICE VISIT (OUTPATIENT)
Dept: NEUROSURGERY | Facility: CLINIC | Age: 63
End: 2023-05-15
Payer: COMMERCIAL

## 2023-05-15 VITALS
DIASTOLIC BLOOD PRESSURE: 77 MMHG | WEIGHT: 302 LBS | HEIGHT: 73 IN | BODY MASS INDEX: 40.02 KG/M2 | RESPIRATION RATE: 16 BRPM | SYSTOLIC BLOOD PRESSURE: 117 MMHG | HEART RATE: 82 BPM

## 2023-05-15 DIAGNOSIS — M48.10 DISH (DIFFUSE IDIOPATHIC SKELETAL HYPEROSTOSIS): Primary | ICD-10-CM

## 2023-05-15 DIAGNOSIS — M54.16 LUMBAR RADICULOPATHY: ICD-10-CM

## 2023-05-15 DIAGNOSIS — M54.12 CERVICAL RADICULOPATHY: ICD-10-CM

## 2023-05-15 DIAGNOSIS — M47.814 SPONDYLOSIS WITHOUT MYELOPATHY OR RADICULOPATHY, THORACIC REGION: ICD-10-CM

## 2023-05-15 DIAGNOSIS — M48.02 SPINAL STENOSIS, CERVICAL REGION: ICD-10-CM

## 2023-05-15 DIAGNOSIS — M47.22 CERVICAL SPONDYLOSIS WITH RADICULOPATHY: ICD-10-CM

## 2023-05-15 PROCEDURE — 3074F PR MOST RECENT SYSTOLIC BLOOD PRESSURE < 130 MM HG: ICD-10-PCS | Mod: CPTII,,, | Performed by: NEUROLOGICAL SURGERY

## 2023-05-15 PROCEDURE — 4010F PR ACE/ARB THEARPY RXD/TAKEN: ICD-10-PCS | Mod: CPTII,,, | Performed by: NEUROLOGICAL SURGERY

## 2023-05-15 PROCEDURE — 3078F PR MOST RECENT DIASTOLIC BLOOD PRESSURE < 80 MM HG: ICD-10-PCS | Mod: CPTII,,, | Performed by: NEUROLOGICAL SURGERY

## 2023-05-15 PROCEDURE — 1160F RVW MEDS BY RX/DR IN RCRD: CPT | Mod: CPTII,,, | Performed by: NEUROLOGICAL SURGERY

## 2023-05-15 PROCEDURE — 3008F PR BODY MASS INDEX (BMI) DOCUMENTED: ICD-10-PCS | Mod: CPTII,,, | Performed by: NEUROLOGICAL SURGERY

## 2023-05-15 PROCEDURE — 99205 PR OFFICE/OUTPT VISIT, NEW, LEVL V, 60-74 MIN: ICD-10-PCS | Mod: ,,, | Performed by: NEUROLOGICAL SURGERY

## 2023-05-15 PROCEDURE — 3288F PR FALLS RISK ASSESSMENT DOCUMENTED: ICD-10-PCS | Mod: CPTII,,, | Performed by: NEUROLOGICAL SURGERY

## 2023-05-15 PROCEDURE — 3008F BODY MASS INDEX DOCD: CPT | Mod: CPTII,,, | Performed by: NEUROLOGICAL SURGERY

## 2023-05-15 PROCEDURE — 4010F ACE/ARB THERAPY RXD/TAKEN: CPT | Mod: CPTII,,, | Performed by: NEUROLOGICAL SURGERY

## 2023-05-15 PROCEDURE — 3288F FALL RISK ASSESSMENT DOCD: CPT | Mod: CPTII,,, | Performed by: NEUROLOGICAL SURGERY

## 2023-05-15 PROCEDURE — 72114 X-RAY EXAM L-S SPINE BENDING: CPT | Mod: TC

## 2023-05-15 PROCEDURE — 1101F PT FALLS ASSESS-DOCD LE1/YR: CPT | Mod: CPTII,,, | Performed by: NEUROLOGICAL SURGERY

## 2023-05-15 PROCEDURE — 1159F MED LIST DOCD IN RCRD: CPT | Mod: CPTII,,, | Performed by: NEUROLOGICAL SURGERY

## 2023-05-15 PROCEDURE — 72052 X-RAY EXAM NECK SPINE 6/>VWS: CPT | Mod: TC

## 2023-05-15 PROCEDURE — 3074F SYST BP LT 130 MM HG: CPT | Mod: CPTII,,, | Performed by: NEUROLOGICAL SURGERY

## 2023-05-15 PROCEDURE — 1101F PR PT FALLS ASSESS DOC 0-1 FALLS W/OUT INJ PAST YR: ICD-10-PCS | Mod: CPTII,,, | Performed by: NEUROLOGICAL SURGERY

## 2023-05-15 PROCEDURE — 1160F PR REVIEW ALL MEDS BY PRESCRIBER/CLIN PHARMACIST DOCUMENTED: ICD-10-PCS | Mod: CPTII,,, | Performed by: NEUROLOGICAL SURGERY

## 2023-05-15 PROCEDURE — 1159F PR MEDICATION LIST DOCUMENTED IN MEDICAL RECORD: ICD-10-PCS | Mod: CPTII,,, | Performed by: NEUROLOGICAL SURGERY

## 2023-05-15 PROCEDURE — 99205 OFFICE O/P NEW HI 60 MIN: CPT | Mod: ,,, | Performed by: NEUROLOGICAL SURGERY

## 2023-05-15 PROCEDURE — 3078F DIAST BP <80 MM HG: CPT | Mod: CPTII,,, | Performed by: NEUROLOGICAL SURGERY

## 2023-05-15 RX ORDER — OXYCODONE AND ACETAMINOPHEN 10; 325 MG/1; MG/1
1 TABLET ORAL
COMMUNITY
End: 2023-06-05

## 2023-05-15 RX ORDER — VIT C/E/ZN/COPPR/LUTEIN/ZEAXAN 250MG-90MG
1000 CAPSULE ORAL DAILY
COMMUNITY
Start: 2023-01-05

## 2023-05-15 RX ORDER — PIOGLITAZONEHYDROCHLORIDE 45 MG/1
45 TABLET ORAL DAILY
COMMUNITY
End: 2023-06-26

## 2023-05-15 NOTE — PROGRESS NOTES
[facility]  Neurosurgery        Eren Page   [id number]   1960       SUBJECTIVE:     CHIEF COMPLAINT:  Lower back pain, neck pain    HPI:  Eren Page is a 63 y.o. male who presents for neurosurgical evaluation.  He complains of constant pain in the lower back.  He has intermittent pain into the right hip, groin, and down the right lateral leg.  He is only able to walk a short distance without pain in the right hip and leg.  He must sit and rest for relief.  He has a pressure sensation in the back that worsens the longer he stands.  He finds that standing is worse than walking.  He has history of L4-5, L5-S1 fusion over 20yrs ago in [place].  He has experienced numbness and weakness in the left lower leg and foot since the surgery.  He began to have recurrent pain about one year after the surgery.  His symptoms have progressively worsened over time, especially the past 5-6 months.  Anti-inflammatories, muscle relaxers, and pain medications are not longer providing much relief.  He gets slight temporary relief with use of a TENS.  He is no longer able to do much due to his symptoms.     He also complains of neck pain, headaches, and pain into bilateral shoulders.  He reports difficulty swallowing, especially in the evenings.  He says he must cut his food into small pieces to keep it from getting stuck.  He had an esophogram years ago and says his esophagus was stretched, which helped for a short time.  A second esophogram was done in [place], but stretching was not performed that time as that was not felt to be the problem.  He has weakness in the left greater than right upper extremities.  He is unable to lift the arm above his head without increased pain in the back.  He has difficulty with balance due to his pain.  He denies disturbances in bowel or bladder function.      Review of patient's allergies indicates:   Allergen Reactions    Adhesive tape-silicones Other (See Comments)     buster  and skin breakdown       Current Outpatient Medications:     amLODIPine (NORVASC) 10 MG tablet, 1 tablet., Disp: , Rfl:     cholecalciferol, vitamin D3, (VITAMIN D3) 25 mcg (1,000 unit) capsule, once daily., Disp: , Rfl:     ergocalciferol, vitamin D2, 10 mcg (400 unit) Tab, Take 10 mcg by mouth once daily., Disp: 30 tablet, Rfl: 12    FLUoxetine 20 MG capsule, Take 1 capsule (20 mg total) by mouth once daily., Disp: 90 capsule, Rfl: 0    GARLIC ORAL, Take 10 mg by mouth., Disp: , Rfl:     HYDROcodone-acetaminophen (NORCO)  mg per tablet, Take 1 tablet by mouth 2 (two) times daily as needed., Disp: , Rfl:     levothyroxine (SYNTHROID) 100 MCG tablet, 1 tablet on an empty stomach in the morning, Disp: 90 tablet, Rfl: 0    lisinopriL-hydrochlorothiazide (PRINZIDE,ZESTORETIC) 20-25 mg Tab, Take 1 tablet by mouth once daily., Disp: 90 tablet, Rfl: 0    meloxicam (MOBIC) 15 MG tablet, Take 15 mg by mouth., Disp: , Rfl:     metFORMIN (GLUCOPHAGE) 1000 MG tablet, Take 1 tablet (1,000 mg total) by mouth 2 (two) times a day. Take one tablet by mouth twice daily., Disp: 180 tablet, Rfl: 1    oxyCODONE-acetaminophen (PERCOCET)  mg per tablet, 1 tablet as needed. 1-2 daily, Disp: , Rfl:     pioglitazone (ACTOS) 45 MG tablet, Take 45 mg by mouth once daily., Disp: , Rfl:     potassium chloride (KLOR-CON) 10 MEQ TbSR, Take 10 mEq by mouth once daily., Disp: , Rfl:     tiZANidine (ZANAFLEX) 4 MG tablet, Take 4 mg by mouth 2 (two) times daily., Disp: , Rfl:     traZODone (DESYREL) 50 MG tablet, Take 1 tablet (50 mg total) by mouth every evening., Disp: 30 tablet, Rfl: 2    zolpidem (AMBIEN) 10 mg Tab, Take 10 mg by mouth nightly., Disp: , Rfl:     aspirin (ECOTRIN) 81 MG EC tablet, Take 81 mg by mouth once daily. STOPPED 04/28/2022, Disp: , Rfl:     cephALEXin (KEFLEX) 500 MG capsule, Take by mouth., Disp: , Rfl:     desoximetasone (TOPICORT) 0.25 % cream, APPLY TO AFFECTED AREA 1 TO 2 TIMES DAILY AS NEEDED, Disp: ,  "Rfl:     glimepiride (AMARYL) 4 MG tablet, Take 1 tablet (4 mg total) by mouth once daily. Take one tablet daily. (Patient not taking: Reported on 5/15/2023), Disp: 90 tablet, Rfl: 1    ibuprofen-famotidine (DUEXIS) 800-26.6 mg Tab, 1 tablet., Disp: , Rfl:     pantoprazole (PROTONIX) 40 MG tablet, Take 1 tablet (40 mg total) by mouth 2 (two) times daily., Disp: 60 tablet, Rfl: 11    rosuvastatin (CRESTOR) 5 MG tablet, Take 1 tablet (5 mg total) by mouth after dinner., Disp: 90 tablet, Rfl: 0    tirzepatide (MOUNJARO) 5 mg/0.5 mL PnIj, Inject 5 mg into the skin every 7 days. (Patient not taking: Reported on 5/15/2023), Disp: 4 pen, Rfl: 1   Past Medical History:   Diagnosis Date    Cervical radiculopathy     Claudication     Diabetes mellitus     Dizziness     Dorsalgia     Dysphagia 04/2022    History of chest pain     Hypertension     Lumbar radiculopathy     Palpitations     Sleep apnea     CPAP USED    SOB (shortness of breath)     Thyroid disease     Wears dentures     FULL     Past Surgical History:   Procedure Laterality Date    APPENDECTOMY      BACK SURGERY      "20 YRS AGO-LOWER BACK" 2 RODS AND 20 SCREWS     CHOLECYSTOTOMY      COLONOSCOPY      TONSILLECTOMY      TOTAL KNEE ARTHROPLASTY Bilateral     VASECTOMY       Family History   Problem Relation Age of Onset    Cervical cancer Mother     Heart disease Mother 76    Heart attack Father 82    Colon cancer Father     Cancer Sister     No Known Problems Brother     Cancer Sister     No Known Problems Brother     No Known Problems Brother     No Known Problems Brother     No Known Problems Brother      Social History     Tobacco Use    Smoking status: Never    Smokeless tobacco: Never   Substance Use Topics    Alcohol use: Yes     Comment: OCC    Drug use: Never        Review of Systems:  Review of Systems   Constitutional:  Negative for chills, fever and weight loss.   HENT:  Negative for ear discharge, ear pain and hearing loss.    Eyes:  Negative for " blurred vision, photophobia and pain.   Respiratory:  Negative for cough and shortness of breath.    Cardiovascular:  Negative for chest pain and palpitations.   Gastrointestinal:  Negative for abdominal pain, nausea and vomiting.   Genitourinary:  Negative for frequency, hematuria and urgency.   Musculoskeletal:  Positive for back pain, joint pain and neck pain.   Skin:  Negative for rash.   Neurological:  Positive for tingling, sensory change, weakness and headaches. Negative for dizziness, seizures and loss of consciousness.   Psychiatric/Behavioral:  Negative for depression, hallucinations and suicidal ideas.             OBJECTIVE:     Vital Signs (Most Recent)  Pulse: 82 (05/15/23 1325)  Resp: 16 (05/15/23 1325)  BP: 117/77 (05/15/23 1325)    Physical Exam:  General:  Pleasant. Well-nourished. Alert. No acute distress.    Head:  Normocephalic, without obvious abnormality, atraumatic    Lungs:  Quiet, non-labored     Neurological:    Oriented to Person, Place, Time   Speech:  normal  Memory, cognition, and affect are appropriate.    Muscle strength against resistance:    Strength  Deltoids Triceps Biceps Wrist Extension Intrinsics Hand    Upper: R 5/5 5/5 5/5 5/5 5/5 5/5    L 5/5 5-/5 5/5 5/5 5/5 5/5     Muscle strength against resistance:    Strength  Hip Flexors Knee extensor Knee Flexion Ankle Dorsiflexion Plantar Flexion EHL Hip Adductor   Lower: R 5/5 5/5 5/5 5/5 5/5 5/5 5/5    L 5/5 5/5 5/5 5-/5 5/5 5/5 5/5       Reflexes:   Right Left   Triceps 2+ 2+   Biceps 2+ 2+   Brachioradialis 1+ 1+   Patellar 0 0   Achilles 0 0     Decreased sensation: left hand (C6, C7, C8), below waist diffusely L>R, left lower leg and foot    Wiseman: negative (both)    Gait:  Stiff, antalgic, and limping    Coordination:  Unable to walk on heels & toes d/t L>R weakness      Musculoskeletal:   Cervical ROM: Pain in extension. Increases swallowing difficulty  SLR is positive on the right at 30 degrees, negative on the  left    MRI of the cervical spine from 07/30/2022 shows severe multilevel spondylosis and severe central stenosis, most prominent at C5-6 greater than C3-4 and C4-5.  There is moderate stenosis at C6-7.  There may be an element of OPLL.  There is no abnormal signal within the cord.  There were large ventral osteophytes from C2 through T2.  Plain x-rays of the cervical spine from 05/15/2023 again show the massive ventral osteophytes extending from just below the anterior tubercle of C1 through all the visualized thoracic vertebrae.  The most dramatic findings are between C2 and C6.  There is solid connection from C3-C4 and C6-C7.  The other ventral osteophytes are not completely bridged.  There is minimal motion with flexion/extension.    MRI of the lumbar spine from 04/25/2023 shows evidence of prior 360 degree instrumented fusion was solid arthrodesis from L4-S1.  Again noted are prominent ventral osteophytes from L1 through L4.  At L1-2 there is a small central and right paracentral disc herniation.  There is severe stenosis at L2-3, partly due to a calcified disc herniation.  There is also severe junctional stenosis at L3-4 there is severe facet arthropathy at this level and lesser findings at L2-3.  Alignment is preserved. There is minimal motion with flexion/extension.  Plain x-rays of the lumbar spine from earlier today show the L4 and S1 pedicle screws and rods as well as the anterior buttress screws and surgical clips.  He has solid fusion from L4-S1.  There is minimal movement throughout the rest of the lumbar spine.    Options were discussed at length with the patient.  He would clearly benefit from both lumbar and cervical decompression.  We talked about extending the lumbar fusion up through L2 through a lateral approach with probable revision of his hardware posteriorly versus a minimally invasive decompression.  His leg symptoms are his biggest complaint right now and he would like to go ahead just  with an MIS decompression.    As far as his cervical spine goes, there significant pros and cons about an anterior approach.  A modified barium swallow.  I think the better approach is going to be posterior, likely a laminoplasty.  I would like to see a CT as well as the above-noted MBS.     We will keep his neck neutral and his MAPS greater than 85 during the lumbar surgery.    ASSESSMENT/PLAN:     1. Lumbar radiculopathy  - Bilateral L2-3, L3-4 decompression, right approach; Keep neck neutral please    2. DISH (diffuse idiopathic skeletal hyperostosis)    3. Cervical spondylosis with radiculopathy    4. Spinal stenosis, cervical region  - CT Cervical Spine Without Contrast; Future  - Fl Modified Barium Swallow Speech; Future  - SLP video swallow; Future    5. Spondylosis without myelopathy or radiculopathy, thoracic region  - MRI Thoracic Spine Without Contrast; Future     We will keep his neck neutral and his MAPS greater than 85 during the lumbar surgery.      I, Dr. Renny Barclay, personally performed the services described in this documentation. All medical record entries made by the scribe, Marguerite Nascimento RN, were at my direction and in my presence.  I have reviewed the chart and agree that the record reflects my personal performance and is accurate and complete. Renny Barclay MD.  1:04 PM 05/15/2023       Renny Barclay MD [facility]

## 2023-05-25 ENCOUNTER — HOSPITAL ENCOUNTER (OUTPATIENT)
Dept: RADIOLOGY | Facility: HOSPITAL | Age: 63
Discharge: HOME | End: 2023-05-25
Attending: NEUROLOGICAL SURGERY
Payer: COMMERCIAL

## 2023-05-25 ENCOUNTER — CLINICAL SUPPORT (OUTPATIENT)
Dept: REHABILITATION | Facility: HOSPITAL | Age: 63
End: 2023-05-25
Attending: NEUROLOGICAL SURGERY
Payer: COMMERCIAL

## 2023-05-25 DIAGNOSIS — M48.02 SPINAL STENOSIS, CERVICAL REGION: ICD-10-CM

## 2023-05-25 DIAGNOSIS — M47.22 CERVICAL SPONDYLOSIS WITH RADICULOPATHY: ICD-10-CM

## 2023-05-25 DIAGNOSIS — M48.10 DISH (DIFFUSE IDIOPATHIC SKELETAL HYPEROSTOSIS): ICD-10-CM

## 2023-05-25 PROCEDURE — 25500020 PHARM REV CODE 255: Performed by: NEUROLOGICAL SURGERY

## 2023-05-25 PROCEDURE — 92611 MOTION FLUOROSCOPY/SWALLOW: CPT

## 2023-05-25 PROCEDURE — 74230 X-RAY XM SWLNG FUNCJ C+: CPT | Mod: TC

## 2023-05-25 PROCEDURE — A9698 NON-RAD CONTRAST MATERIALNOC: HCPCS | Performed by: NEUROLOGICAL SURGERY

## 2023-05-25 RX ADMIN — BARIUM SULFATE 30 ML: 0.81 POWDER, FOR SUSPENSION ORAL at 08:05

## 2023-05-25 NOTE — PROGRESS NOTES
"Speech Language Pathology Department  Modified Barium Swallow Study    Patient Name:  Eren Page   MRN:  [id number]    Recommendations:     General recommendations:   follow up with Dr. Barclay as scheduled and consider GI follow up regarding hx of esophageal dilation  Repeat MBS study: n/a  Diet recommendations: Regular solids and thin liquids  Swallow strategies/precautions: small bites/sips, slow rate, alternate solids/liquids, small/more frequent meals to reduce fatigue, and medications per patient preference  General precautions: Standard      History/Reason for Referral:     Eren Page is a/n 63 y.o. male referred for a Modified Barium Swallow Study to assess the efficiency of his swallow function, rule out aspiration and make recommendations regarding safe dietary consistencies, effective compensatory strategies, and safe eating environment.     Past Medical History:   Diagnosis Date    Cervical radiculopathy     Claudication     Diabetes mellitus     Dizziness     Dorsalgia     Dysphagia 04/2022    History of chest pain     Hypertension     Lumbar radiculopathy     Palpitations     Sleep apnea     CPAP USED    SOB (shortness of breath)     Thyroid disease     Wears dentures     FULL     Past Surgical History:   Procedure Laterality Date    APPENDECTOMY      BACK SURGERY      "20 YRS AGO-LOWER BACK" 2 RODS AND 20 SCREWS     CHOLECYSTOTOMY      COLONOSCOPY      TONSILLECTOMY      TOTAL KNEE ARTHROPLASTY Bilateral     VASECTOMY       Home Diet: Regular and thin liquids  Current Method of Nutrition:  oral intake    Patient complaint: globus sensation with solids greater than liquids, difficulty swallowing pills, difficulty increases at night    Subjective:     Patient awake, alert, and cooperative.    Spiritual/Cultural/Protestant Beliefs/Practices that affect care:  none  Pain/Comfort: no complaints of pain     Respiratory Status: room air    Fluoroscopic Results:     Oral Musculature  Dentition: upper dentures " and lower dentures  Secretion Management: adequate  Mucosal Quality: good  Facial Movement: WFL  Buccal Strength & Mobility: WFL  Mandibular Strength & Mobility: WFL  Oral Labial Strength & Mobility: WFL  Lingual Strength & Mobility: WFL  Velar Elevation: WFL  Vocal Quality: adequate    Positioning:  seated in straight chair  Visualization  Patient was seen in the lateral view  Cervical ossifications as identified on 5/15 CT of the neck visualized    Oral Phase:   Adequate lip closure  Adequate bolus formation  Adequate anterior-posterior transport  Adequate mastication  Adequate bolus cohesion  Loss of bolus control with liquids    Pharyngeal Phase:   Swallow delay with spill to the pyriform sinuses  Adequate base of tongue retraction  Reduced epiglottic deflection due to cervical ossifications  Reduced hyolaryngeal excursion  Reduced airway protection  Consistency Laryngeal Penetration Aspiration Residue   Thin liquid by cup During the swallow  Cleared spontaneously None None   Mildly thick liquid by cup None None None   Puree None None None   Thin liquid by straw None None None   Barium tablet with thin liquid None None None     Cervical Esophageal Phase:   UES appeared to accommodate all bolus types without stasis or retrograde movement visualized    Assessment:     Pt exhibited Mild oropharyngeal dysphagia characterized by the findings noted above.  Laryngeal penetration of thin liquid cleared spontaneously and no aspiration was visualized during this study.   Swallow safety is preserved, however efficiency may be impacted by fatigue .      Patient appears to be at low risk for aspiration related pneumonia when considering good oral health status, overall health/immune status, adequate laryngeal vestibule closure, and severity of dysphagia.      Patient Education:     Patient provided with verbal education regarding MBS results/recommendations.  Understanding was verbalized, however pt appeared disinterested in  changing his routine to reduce fatigue in the evenings.    Plan:     SLP Follow-Up: none warranted    Time Tracking:     SLP Treatment Date:   5/25/23   Speech Start Time:  0835   Speech Stop Time:  0855      Speech Total Time (min):  20 minutes     Billable minutes:   Motion Fluoroscopic Evaluation, Video Recording, 20 minutes     05/25/2023

## 2023-05-30 ENCOUNTER — TELEPHONE (OUTPATIENT)
Dept: NEUROSURGERY | Facility: CLINIC | Age: 63
End: 2023-05-30
Payer: COMMERCIAL

## 2023-05-30 NOTE — TELEPHONE ENCOUNTER
The patient is calling to see if we can send in Norco 10/325mg.  The last script was sent in by Dr. Granados on 3/22/23 for 1 BID prn.  He stated that he would only take 1 at bedtime to try and stretch it, but he ran out at the end of April.  He called their office to get a refill, but he is unable to get a refill until he goes in, but they can not see him soon.  He complains of the same symptoms as his last visit, but they are worsening especially his right hip and leg pain.  He is schedule to preop on 6/5/23 with surgery on 6/15.  Is it ok to prescribe the Norco?  Please advise..SANIA    He uses [email or web address] in [place].  SANIA

## 2023-06-05 ENCOUNTER — HOSPITAL ENCOUNTER (OUTPATIENT)
Dept: RADIOLOGY | Facility: HOSPITAL | Age: 63
Discharge: HOME | End: 2023-06-05
Attending: PHYSICIAN ASSISTANT
Payer: COMMERCIAL

## 2023-06-05 ENCOUNTER — OFFICE VISIT (OUTPATIENT)
Dept: NEUROSURGERY | Facility: CLINIC | Age: 63
End: 2023-06-05
Payer: COMMERCIAL

## 2023-06-05 VITALS
HEIGHT: 73 IN | TEMPERATURE: 98 F | HEART RATE: 68 BPM | RESPIRATION RATE: 16 BRPM | WEIGHT: 311.81 LBS | BODY MASS INDEX: 41.32 KG/M2 | DIASTOLIC BLOOD PRESSURE: 71 MMHG | SYSTOLIC BLOOD PRESSURE: 117 MMHG

## 2023-06-05 DIAGNOSIS — I10 HYPERTENSION, UNSPECIFIED TYPE: ICD-10-CM

## 2023-06-05 DIAGNOSIS — G47.30 SLEEP APNEA, UNSPECIFIED TYPE: ICD-10-CM

## 2023-06-05 DIAGNOSIS — R00.2 PALPITATIONS: ICD-10-CM

## 2023-06-05 DIAGNOSIS — M54.16 LUMBAR RADICULOPATHY: ICD-10-CM

## 2023-06-05 DIAGNOSIS — E11.9 TYPE 2 DIABETES MELLITUS WITHOUT COMPLICATION, WITHOUT LONG-TERM CURRENT USE OF INSULIN: ICD-10-CM

## 2023-06-05 DIAGNOSIS — M48.062 LUMBAR STENOSIS WITH NEUROGENIC CLAUDICATION: Primary | ICD-10-CM

## 2023-06-05 DIAGNOSIS — Z01.818 PREOPERATIVE TESTING: ICD-10-CM

## 2023-06-05 PROCEDURE — 3288F PR FALLS RISK ASSESSMENT DOCUMENTED: ICD-10-PCS | Mod: CPTII,,, | Performed by: PHYSICIAN ASSISTANT

## 2023-06-05 PROCEDURE — 3078F DIAST BP <80 MM HG: CPT | Mod: CPTII,,, | Performed by: PHYSICIAN ASSISTANT

## 2023-06-05 PROCEDURE — 99213 PR OFFICE/OUTPT VISIT, EST, LEVL III, 20-29 MIN: ICD-10-PCS | Mod: ,,, | Performed by: PHYSICIAN ASSISTANT

## 2023-06-05 PROCEDURE — 4010F ACE/ARB THERAPY RXD/TAKEN: CPT | Mod: CPTII,,, | Performed by: PHYSICIAN ASSISTANT

## 2023-06-05 PROCEDURE — 1101F PT FALLS ASSESS-DOCD LE1/YR: CPT | Mod: CPTII,,, | Performed by: PHYSICIAN ASSISTANT

## 2023-06-05 PROCEDURE — 4010F PR ACE/ARB THEARPY RXD/TAKEN: ICD-10-PCS | Mod: CPTII,,, | Performed by: PHYSICIAN ASSISTANT

## 2023-06-05 PROCEDURE — 71046 X-RAY EXAM CHEST 2 VIEWS: CPT | Mod: TC

## 2023-06-05 PROCEDURE — 3288F FALL RISK ASSESSMENT DOCD: CPT | Mod: CPTII,,, | Performed by: PHYSICIAN ASSISTANT

## 2023-06-05 PROCEDURE — 1101F PR PT FALLS ASSESS DOC 0-1 FALLS W/OUT INJ PAST YR: ICD-10-PCS | Mod: CPTII,,, | Performed by: PHYSICIAN ASSISTANT

## 2023-06-05 PROCEDURE — 3074F PR MOST RECENT SYSTOLIC BLOOD PRESSURE < 130 MM HG: ICD-10-PCS | Mod: CPTII,,, | Performed by: PHYSICIAN ASSISTANT

## 2023-06-05 PROCEDURE — 3078F PR MOST RECENT DIASTOLIC BLOOD PRESSURE < 80 MM HG: ICD-10-PCS | Mod: CPTII,,, | Performed by: PHYSICIAN ASSISTANT

## 2023-06-05 PROCEDURE — 3074F SYST BP LT 130 MM HG: CPT | Mod: CPTII,,, | Performed by: PHYSICIAN ASSISTANT

## 2023-06-05 PROCEDURE — 3008F BODY MASS INDEX DOCD: CPT | Mod: CPTII,,, | Performed by: PHYSICIAN ASSISTANT

## 2023-06-05 PROCEDURE — 99213 OFFICE O/P EST LOW 20 MIN: CPT | Mod: ,,, | Performed by: PHYSICIAN ASSISTANT

## 2023-06-05 PROCEDURE — 3008F PR BODY MASS INDEX (BMI) DOCUMENTED: ICD-10-PCS | Mod: CPTII,,, | Performed by: PHYSICIAN ASSISTANT

## 2023-06-05 RX ORDER — CYCLOBENZAPRINE HCL 10 MG
10 TABLET ORAL 3 TIMES DAILY PRN
Qty: 30 TABLET | Refills: 2 | Status: SHIPPED | OUTPATIENT
Start: 2023-06-05 | End: 2023-06-15

## 2023-06-05 RX ORDER — INSULIN GLARGINE 300 U/ML
INJECTION, SOLUTION SUBCUTANEOUS
COMMUNITY
End: 2023-06-05

## 2023-06-05 RX ORDER — NALTREXONE HYDROCHLORIDE AND BUPROPION HYDROCHLORIDE 8; 90 MG/1; MG/1
TABLET, EXTENDED RELEASE ORAL
COMMUNITY
End: 2023-06-05

## 2023-06-05 RX ORDER — HYDROCODONE BITARTRATE AND ACETAMINOPHEN 10; 325 MG/1; MG/1
1 TABLET ORAL EVERY 4 HOURS PRN
Qty: 40 TABLET | Refills: 0 | Status: SHIPPED | OUTPATIENT
Start: 2023-06-05 | End: 2023-07-11

## 2023-06-05 RX ORDER — FUROSEMIDE 20 MG/1
TABLET ORAL
COMMUNITY
End: 2023-06-26

## 2023-06-05 NOTE — H&P (VIEW-ONLY)
"[facility]  History & Physical  Neurosurgery      Eren Page   [id number]   1960       SUBJECTIVE:     CHIEF COMPLAINT:  No chief complaint on file.      HPI:  Eren Page is a 63 y.o. male who presents for neurosurgical evaluation.       Complain of neck pain with HA and pain into bilatearal shoulders.  LBP with pain radiating into the right lateral thigh, hip, lateral LL.  Pain burning aching.  + numbness in bilateral feet/heels.  Drags feet.  Walks about 10-20 paces before has to stop and rest.  Standing worse than walking.    + weakness in both legs.      No bowel or bladder.  + difficulties with balance due to pain.    Past Medical History:   Diagnosis Date    Cervical radiculopathy     Claudication     Depression     Diabetes mellitus     Dizziness     Dorsalgia     Dyslipidemia     Dysphagia 04/2022    History of chest pain     Hypertension     Hypothyroidism     Lumbar radiculopathy     Palpitations     Sleep apnea     CPAP USED    SOB (shortness of breath)     Wears dentures     FULL       Past Surgical History:   Procedure Laterality Date    APPENDECTOMY      BACK SURGERY      "20 YRS AGO-LOWER BACK" 2 RODS AND 20 SCREWS     CHOLECYSTOTOMY      COLONOSCOPY      TONSILLECTOMY      TOTAL KNEE ARTHROPLASTY Bilateral     VASECTOMY         Family History   Problem Relation Age of Onset    Cervical cancer Mother     Heart disease Mother 76    Heart attack Father 82    Colon cancer Father     Cancer Sister     No Known Problems Brother     Cancer Sister     No Known Problems Brother     No Known Problems Brother     No Known Problems Brother     No Known Problems Brother        Social History     Socioeconomic History    Marital status:     Number of children: 4   Tobacco Use    Smoking status: Never    Smokeless tobacco: Never   Substance and Sexual Activity    Alcohol use: Yes     Comment: OCC    Drug use: Never        Review of patient's allergies indicates:   Allergen Reactions    " Adhesive tape-silicones Other (See Comments)     welps and skin breakdown        Current Outpatient Medications   Medication Instructions    amLODIPine (NORVASC) 10 MG tablet 1 tablet    cholecalciferol, vitamin D3, (VITAMIN D3) 25 mcg (1,000 unit) capsule Daily    ergocalciferol (vitamin D2) 10 mcg, Oral, Daily    FLUoxetine 20 mg, Oral, Daily    furosemide (LASIX) 20 MG tablet 1 tablet Orally Once a day    glimepiride (AMARYL) 4 mg, Oral, Daily, Take one tablet daily.    HYDROcodone-acetaminophen (NORCO)  mg per tablet 1 tablet, Oral, 2 times daily PRN    ibuprofen-famotidine (DUEXIS) 800-26.6 mg Tab 1 tablet    levothyroxine (SYNTHROID) 100 MCG tablet 1 tablet on an empty stomach in the morning    lisinopriL-hydrochlorothiazide (PRINZIDE,ZESTORETIC) 20-25 mg Tab 1 tablet, Oral, Daily    meloxicam (MOBIC) 15 mg, Oral    metFORMIN (GLUCOPHAGE) 1,000 mg, Oral, 2 times daily, Take one tablet by mouth twice daily.    naltrexone-bupropion (CONTRAVE) 8-90 mg TbSR 1 tablet in the morning Orally Once a day    pantoprazole (PROTONIX) 40 mg, Oral, 2 times daily    pioglitazone (ACTOS) 45 mg, Oral, Daily    potassium chloride (KLOR-CON) 10 MEQ TbSR 10 mEq, Oral, Daily    rosuvastatin (CRESTOR) 5 mg, Oral, After dinner    semaglutide (OZEMPIC) 0.25 mg, Subcutaneous, Every 7 days    tiZANidine (ZANAFLEX) 4 mg, Oral, 2 times daily    traZODone (DESYREL) 50 mg, Oral, Nightly    zolpidem (AMBIEN) 10 mg, Oral, Nightly        Review of Systems:  Review of Systems   Constitutional:  Negative for chills, fever and weight loss.   HENT:  Negative for ear discharge, ear pain and hearing loss.    Eyes:  Negative for blurred vision, photophobia and pain.   Respiratory:  Negative for cough and shortness of breath.    Cardiovascular:  Negative for chest pain and palpitations.   Gastrointestinal:  Negative for abdominal pain, nausea and vomiting.   Genitourinary:  Negative for frequency, hematuria and urgency.   Musculoskeletal:   "Positive for back pain, joint pain and neck pain.   Skin:  Negative for rash.   Neurological:  Positive for tingling, sensory change, weakness and headaches. Negative for dizziness, seizures and loss of consciousness.   Psychiatric/Behavioral:  Negative for depression, hallucinations and suicidal ideas.        OBJECTIVE:     Physical Examination:    Visit Vitals  /71   Pulse 68   Temp 98.2 °F (36.8 °C)   Resp 16   Ht 6' 1" (1.854 m)   Wt (!) 141.4 kg (311 lb 12.8 oz)   BMI 41.14 kg/m²        General:  Pleasant, Well-nourished, Well-groomed.    CV:  Neck is supple.  There are no carotid bruits.  Heart has regular rate and rhythm.    Lungs:  Lungs are clear to auscultation bilaterally with non-labored respirations.    Abdomen:  Soft, non-tender, non-distended.    Neurological:    Oriented to Person, Place, Time   Speech:  normal  Memory, cognition, and affect are appropriate.     Muscle strength against resistance:     Strength   Deltoids Triceps Biceps Wrist Extension Intrinsics Hand    Upper: R 5/5 5/5 5/5 5/5 5/5 5/5     L 5/5 5-/5 5/5 5/5 5/5 5/5      Muscle strength against resistance:     Strength   Hip Flexors Knee extensor Knee Flexion Ankle Dorsiflexion Plantar Flexion EHL Hip Adductor   Lower: R 5/5 5/5 5/5 5/5 5/5 5/5 5/5     L 5/5 5/5 5/5 5-/5 5/5 5/5 5/5         Reflexes:    Right Left   Triceps 2+ 2+   Biceps 2+ 2+   Brachioradialis 1+ 1+   Patellar 0 0   Achilles 0 0      Decreased sensation: left hand (C6, C7, C8), below waist diffusely L>R, left lower leg and foot     Wiseman: negative (both)     Gait:  Stiff, antalgic, and limping     Coordination:  Unable to walk on heels & toes d/t L>R weakness        Musculoskeletal:   Cervical ROM: Pain in extension. Increases swallowing difficulty  SLR is positive on the right at 30 degrees, negative on the left     Imaging:  MRI of the cervical spine from 07/30/2022 shows severe multilevel spondylosis and severe central stenosis, most prominent at " C5-6 greater than C3-4 and C4-5.  There is moderate stenosis at C6-7.  There may be an element of OPLL.  There is no abnormal signal within the cord.  There were large ventral osteophytes from C2 through T2.  Plain x-rays of the cervical spine from 05/15/2023 again show the massive ventral osteophytes extending from just below the anterior tubercle of C1 through all the visualized thoracic vertebrae.  The most dramatic findings are between C2 and C6.  There is solid connection from C3-C4 and C6-C7.  The other ventral osteophytes are not completely bridged.  There is minimal motion with flexion/extension.     MRI of the lumbar spine from 04/25/2023 shows evidence of prior 360 degree instrumented fusion was solid arthrodesis from L4-S1.  Again noted are prominent ventral osteophytes from L1 through L4.  At L1-2 there is a small central and right paracentral disc herniation.  There is severe stenosis at L2-3, partly due to a calcified disc herniation.  There is also severe junctional stenosis at L3-4 there is severe facet arthropathy at this level and lesser findings at L2-3.  Alignment is preserved. There is minimal motion with flexion/extension.  Plain x-rays of the lumbar spine from earlier today show the L4 and S1 pedicle screws and rods as well as the anterior buttress screws and surgical clips.  He has solid fusion from L4-S1.  There is minimal movement throughout the rest of the lumbar spine.      ASSESSMENT:     1. Lumbar stenosis with neurogenic claudication        2. Lumbar radiculopathy  EKG 12-lead    X-Ray Chest PA And Lateral    Comprehensive Metabolic Panel    CBC Auto Differential    HYDROcodone-acetaminophen (NORCO)  mg per tablet    cyclobenzaprine (FLEXERIL) 10 MG tablet      3. Type 2 diabetes mellitus without complication, without long-term current use of insulin  Comprehensive Metabolic Panel      4. Preoperative testing  EKG 12-lead    X-Ray Chest PA And Lateral    Comprehensive Metabolic  "Panel    CBC Auto Differential      5. Hypertension, unspecified type  EKG 12-lead    X-Ray Chest PA And Lateral      6. Sleep apnea, unspecified type  X-Ray Chest PA And Lateral      7. Palpitations  EKG 12-lead         PLAN:     Per Dr. Barclay  "Options were discussed at length with the patient.  He would clearly benefit from both lumbar and cervical decompression.  We talked about extending the lumbar fusion up through L2 through a lateral approach with probable revision of his hardware posteriorly versus a minimally invasive decompression.  His leg symptoms are his biggest complaint right now and he would like to go ahead just with an MIS decompression.     As far as his cervical spine goes, there significant pros and cons about an anterior approach.  A modified barium swallow.  I think the better approach is going to be posterior, likely a laminoplasty.  I would like to see a CT as well as the above-noted MBS.      We will keep his neck neutral and his MAPS greater than 85 during the lumbar surgery."    All of the patient's and his wife's questions were answered.  Consents were signed at this time.      A total of 37 minutes was spent face-to-face with the patient during this encounter.  Over half of that time was spent on counseling and coordination of care.       Marilee Gonzalez PA-C    "

## 2023-06-05 NOTE — PROGRESS NOTES
"[facility]  History & Physical  Neurosurgery      Eren Page   [id number]   1960       SUBJECTIVE:     CHIEF COMPLAINT:  No chief complaint on file.      HPI:  Eren Page is a 63 y.o. male who presents for neurosurgical evaluation.       Complain of neck pain with HA and pain into bilatearal shoulders.  LBP with pain radiating into the right lateral thigh, hip, lateral LL.  Pain burning aching.  + numbness in bilateral feet/heels.  Drags feet.  Walks about 10-20 paces before has to stop and rest.  Standing worse than walking.    + weakness in both legs.      No bowel or bladder.  + difficulties with balance due to pain.    Past Medical History:   Diagnosis Date    Cervical radiculopathy     Claudication     Depression     Diabetes mellitus     Dizziness     Dorsalgia     Dyslipidemia     Dysphagia 04/2022    History of chest pain     Hypertension     Hypothyroidism     Lumbar radiculopathy     Palpitations     Sleep apnea     CPAP USED    SOB (shortness of breath)     Wears dentures     FULL       Past Surgical History:   Procedure Laterality Date    APPENDECTOMY      BACK SURGERY      "20 YRS AGO-LOWER BACK" 2 RODS AND 20 SCREWS     CHOLECYSTOTOMY      COLONOSCOPY      TONSILLECTOMY      TOTAL KNEE ARTHROPLASTY Bilateral     VASECTOMY         Family History   Problem Relation Age of Onset    Cervical cancer Mother     Heart disease Mother 76    Heart attack Father 82    Colon cancer Father     Cancer Sister     No Known Problems Brother     Cancer Sister     No Known Problems Brother     No Known Problems Brother     No Known Problems Brother     No Known Problems Brother        Social History     Socioeconomic History    Marital status:     Number of children: 4   Tobacco Use    Smoking status: Never    Smokeless tobacco: Never   Substance and Sexual Activity    Alcohol use: Yes     Comment: OCC    Drug use: Never        Review of patient's allergies indicates:   Allergen Reactions    " Adhesive tape-silicones Other (See Comments)     welps and skin breakdown        Current Outpatient Medications   Medication Instructions    amLODIPine (NORVASC) 10 MG tablet 1 tablet    cholecalciferol, vitamin D3, (VITAMIN D3) 25 mcg (1,000 unit) capsule Daily    ergocalciferol (vitamin D2) 10 mcg, Oral, Daily    FLUoxetine 20 mg, Oral, Daily    furosemide (LASIX) 20 MG tablet 1 tablet Orally Once a day    glimepiride (AMARYL) 4 mg, Oral, Daily, Take one tablet daily.    HYDROcodone-acetaminophen (NORCO)  mg per tablet 1 tablet, Oral, 2 times daily PRN    ibuprofen-famotidine (DUEXIS) 800-26.6 mg Tab 1 tablet    levothyroxine (SYNTHROID) 100 MCG tablet 1 tablet on an empty stomach in the morning    lisinopriL-hydrochlorothiazide (PRINZIDE,ZESTORETIC) 20-25 mg Tab 1 tablet, Oral, Daily    meloxicam (MOBIC) 15 mg, Oral    metFORMIN (GLUCOPHAGE) 1,000 mg, Oral, 2 times daily, Take one tablet by mouth twice daily.    naltrexone-bupropion (CONTRAVE) 8-90 mg TbSR 1 tablet in the morning Orally Once a day    pantoprazole (PROTONIX) 40 mg, Oral, 2 times daily    pioglitazone (ACTOS) 45 mg, Oral, Daily    potassium chloride (KLOR-CON) 10 MEQ TbSR 10 mEq, Oral, Daily    rosuvastatin (CRESTOR) 5 mg, Oral, After dinner    semaglutide (OZEMPIC) 0.25 mg, Subcutaneous, Every 7 days    tiZANidine (ZANAFLEX) 4 mg, Oral, 2 times daily    traZODone (DESYREL) 50 mg, Oral, Nightly    zolpidem (AMBIEN) 10 mg, Oral, Nightly        Review of Systems:  Review of Systems   Constitutional:  Negative for chills, fever and weight loss.   HENT:  Negative for ear discharge, ear pain and hearing loss.    Eyes:  Negative for blurred vision, photophobia and pain.   Respiratory:  Negative for cough and shortness of breath.    Cardiovascular:  Negative for chest pain and palpitations.   Gastrointestinal:  Negative for abdominal pain, nausea and vomiting.   Genitourinary:  Negative for frequency, hematuria and urgency.   Musculoskeletal:   "Positive for back pain, joint pain and neck pain.   Skin:  Negative for rash.   Neurological:  Positive for tingling, sensory change, weakness and headaches. Negative for dizziness, seizures and loss of consciousness.   Psychiatric/Behavioral:  Negative for depression, hallucinations and suicidal ideas.        OBJECTIVE:     Physical Examination:    Visit Vitals  /71   Pulse 68   Temp 98.2 °F (36.8 °C)   Resp 16   Ht 6' 1" (1.854 m)   Wt (!) 141.4 kg (311 lb 12.8 oz)   BMI 41.14 kg/m²        General:  Pleasant, Well-nourished, Well-groomed.    CV:  Neck is supple.  There are no carotid bruits.  Heart has regular rate and rhythm.    Lungs:  Lungs are clear to auscultation bilaterally with non-labored respirations.    Abdomen:  Soft, non-tender, non-distended.    Neurological:    Oriented to Person, Place, Time   Speech:  normal  Memory, cognition, and affect are appropriate.     Muscle strength against resistance:     Strength   Deltoids Triceps Biceps Wrist Extension Intrinsics Hand    Upper: R 5/5 5/5 5/5 5/5 5/5 5/5     L 5/5 5-/5 5/5 5/5 5/5 5/5      Muscle strength against resistance:     Strength   Hip Flexors Knee extensor Knee Flexion Ankle Dorsiflexion Plantar Flexion EHL Hip Adductor   Lower: R 5/5 5/5 5/5 5/5 5/5 5/5 5/5     L 5/5 5/5 5/5 5-/5 5/5 5/5 5/5         Reflexes:    Right Left   Triceps 2+ 2+   Biceps 2+ 2+   Brachioradialis 1+ 1+   Patellar 0 0   Achilles 0 0      Decreased sensation: left hand (C6, C7, C8), below waist diffusely L>R, left lower leg and foot     Wiseman: negative (both)     Gait:  Stiff, antalgic, and limping     Coordination:  Unable to walk on heels & toes d/t L>R weakness        Musculoskeletal:   Cervical ROM: Pain in extension. Increases swallowing difficulty  SLR is positive on the right at 30 degrees, negative on the left     Imaging:  MRI of the cervical spine from 07/30/2022 shows severe multilevel spondylosis and severe central stenosis, most prominent at " C5-6 greater than C3-4 and C4-5.  There is moderate stenosis at C6-7.  There may be an element of OPLL.  There is no abnormal signal within the cord.  There were large ventral osteophytes from C2 through T2.  Plain x-rays of the cervical spine from 05/15/2023 again show the massive ventral osteophytes extending from just below the anterior tubercle of C1 through all the visualized thoracic vertebrae.  The most dramatic findings are between C2 and C6.  There is solid connection from C3-C4 and C6-C7.  The other ventral osteophytes are not completely bridged.  There is minimal motion with flexion/extension.     MRI of the lumbar spine from 04/25/2023 shows evidence of prior 360 degree instrumented fusion was solid arthrodesis from L4-S1.  Again noted are prominent ventral osteophytes from L1 through L4.  At L1-2 there is a small central and right paracentral disc herniation.  There is severe stenosis at L2-3, partly due to a calcified disc herniation.  There is also severe junctional stenosis at L3-4 there is severe facet arthropathy at this level and lesser findings at L2-3.  Alignment is preserved. There is minimal motion with flexion/extension.  Plain x-rays of the lumbar spine from earlier today show the L4 and S1 pedicle screws and rods as well as the anterior buttress screws and surgical clips.  He has solid fusion from L4-S1.  There is minimal movement throughout the rest of the lumbar spine.      ASSESSMENT:     1. Lumbar stenosis with neurogenic claudication        2. Lumbar radiculopathy  EKG 12-lead    X-Ray Chest PA And Lateral    Comprehensive Metabolic Panel    CBC Auto Differential    HYDROcodone-acetaminophen (NORCO)  mg per tablet    cyclobenzaprine (FLEXERIL) 10 MG tablet      3. Type 2 diabetes mellitus without complication, without long-term current use of insulin  Comprehensive Metabolic Panel      4. Preoperative testing  EKG 12-lead    X-Ray Chest PA And Lateral    Comprehensive Metabolic  "Panel    CBC Auto Differential      5. Hypertension, unspecified type  EKG 12-lead    X-Ray Chest PA And Lateral      6. Sleep apnea, unspecified type  X-Ray Chest PA And Lateral      7. Palpitations  EKG 12-lead         PLAN:     Per Dr. Barclay  "Options were discussed at length with the patient.  He would clearly benefit from both lumbar and cervical decompression.  We talked about extending the lumbar fusion up through L2 through a lateral approach with probable revision of his hardware posteriorly versus a minimally invasive decompression.  His leg symptoms are his biggest complaint right now and he would like to go ahead just with an MIS decompression.     As far as his cervical spine goes, there significant pros and cons about an anterior approach.  A modified barium swallow.  I think the better approach is going to be posterior, likely a laminoplasty.  I would like to see a CT as well as the above-noted MBS.      We will keep his neck neutral and his MAPS greater than 85 during the lumbar surgery."    All of the patient's and his wife's questions were answered.  Consents were signed at this time.      A total of 37 minutes was spent face-to-face with the patient during this encounter.  Over half of that time was spent on counseling and coordination of care.       Marilee Gonzalez PA-C    "

## 2023-06-07 ENCOUNTER — ANESTHESIA EVENT (OUTPATIENT)
Dept: SURGERY | Facility: HOSPITAL | Age: 63
End: 2023-06-07
Payer: COMMERCIAL

## 2023-06-10 ENCOUNTER — PATIENT MESSAGE (OUTPATIENT)
Dept: ADMINISTRATIVE | Facility: OTHER | Age: 63
End: 2023-06-10
Payer: COMMERCIAL

## 2023-06-11 ENCOUNTER — PATIENT MESSAGE (OUTPATIENT)
Dept: ADMINISTRATIVE | Facility: OTHER | Age: 63
End: 2023-06-11
Payer: COMMERCIAL

## 2023-06-12 ENCOUNTER — TELEPHONE (OUTPATIENT)
Dept: NEUROSURGERY | Facility: CLINIC | Age: 63
End: 2023-06-12
Payer: COMMERCIAL

## 2023-06-12 DIAGNOSIS — M48.061 SPINAL STENOSIS, LUMBAR: ICD-10-CM

## 2023-06-12 DIAGNOSIS — M48.062 LUMBAR STENOSIS WITH NEUROGENIC CLAUDICATION: Primary | ICD-10-CM

## 2023-06-13 ENCOUNTER — ANESTHESIA (OUTPATIENT)
Dept: SURGERY | Facility: HOSPITAL | Age: 63
End: 2023-06-13
Payer: COMMERCIAL

## 2023-06-13 ENCOUNTER — HOSPITAL ENCOUNTER (OUTPATIENT)
Facility: HOSPITAL | Age: 63
Discharge: HOME | End: 2023-06-14
Attending: NEUROLOGICAL SURGERY | Admitting: NEUROLOGICAL SURGERY
Payer: COMMERCIAL

## 2023-06-13 DIAGNOSIS — M48.062 SPINAL STENOSIS OF LUMBAR REGION WITH NEUROGENIC CLAUDICATION: ICD-10-CM

## 2023-06-13 DIAGNOSIS — M48.061 SPINAL STENOSIS, LUMBAR: ICD-10-CM

## 2023-06-13 DIAGNOSIS — M48.062 LUMBAR STENOSIS WITH NEUROGENIC CLAUDICATION: Primary | ICD-10-CM

## 2023-06-13 PROBLEM — M54.16 LUMBAR RADICULOPATHY: Status: ACTIVE | Noted: 2023-06-13

## 2023-06-13 LAB — POCT GLUCOSE: 139 MG/DL (ref 70–110)

## 2023-06-13 PROCEDURE — 71000015 HC POSTOP RECOV 1ST HR: Performed by: NEUROLOGICAL SURGERY

## 2023-06-13 PROCEDURE — 63048 PR LAMINECT/FACETECT/FORAMINOT, EA ADDTL VERTEBRAL SEGM: ICD-10-PCS | Mod: ,,, | Performed by: NEUROLOGICAL SURGERY

## 2023-06-13 PROCEDURE — 27201423 OPTIME MED/SURG SUP & DEVICES STERILE SUPPLY: Performed by: NEUROLOGICAL SURGERY

## 2023-06-13 PROCEDURE — 63047 LAM FACETEC & FORAMOT LUMBAR: CPT | Mod: ,,, | Performed by: NEUROLOGICAL SURGERY

## 2023-06-13 PROCEDURE — 63600175 PHARM REV CODE 636 W HCPCS: Performed by: NURSE ANESTHETIST, CERTIFIED REGISTERED

## 2023-06-13 PROCEDURE — G0378 HOSPITAL OBSERVATION PER HR: HCPCS

## 2023-06-13 PROCEDURE — 25000003 PHARM REV CODE 250: Performed by: PHYSICIAN ASSISTANT

## 2023-06-13 PROCEDURE — D9220A PRA ANESTHESIA: ICD-10-PCS | Mod: ANES,,, | Performed by: ANESTHESIOLOGY

## 2023-06-13 PROCEDURE — 71000016 HC POSTOP RECOV ADDL HR: Performed by: NEUROLOGICAL SURGERY

## 2023-06-13 PROCEDURE — 51701 INSERT BLADDER CATHETER: CPT

## 2023-06-13 PROCEDURE — 36000711: Performed by: NEUROLOGICAL SURGERY

## 2023-06-13 PROCEDURE — 63600175 PHARM REV CODE 636 W HCPCS: Performed by: ANESTHESIOLOGY

## 2023-06-13 PROCEDURE — 71000033 HC RECOVERY, INTIAL HOUR: Performed by: NEUROLOGICAL SURGERY

## 2023-06-13 PROCEDURE — D9220A PRA ANESTHESIA: ICD-10-PCS | Mod: CRNA,,, | Performed by: NURSE ANESTHETIST, CERTIFIED REGISTERED

## 2023-06-13 PROCEDURE — 63047 LAM FACETEC & FORAMOT LUMBAR: CPT | Mod: AS,,, | Performed by: PHYSICIAN ASSISTANT

## 2023-06-13 PROCEDURE — C1729 CATH, DRAINAGE: HCPCS | Performed by: NEUROLOGICAL SURGERY

## 2023-06-13 PROCEDURE — 37000008 HC ANESTHESIA 1ST 15 MINUTES: Performed by: NEUROLOGICAL SURGERY

## 2023-06-13 PROCEDURE — 63048 LAM FACETEC &FORAMOT EA ADDL: CPT | Mod: ,,, | Performed by: NEUROLOGICAL SURGERY

## 2023-06-13 PROCEDURE — 63047 PR LAMINEC/FACETECT/FORAMIN,LUMBAR 1 SEG: ICD-10-PCS | Mod: ,,, | Performed by: NEUROLOGICAL SURGERY

## 2023-06-13 PROCEDURE — 63600175 PHARM REV CODE 636 W HCPCS: Performed by: PHYSICIAN ASSISTANT

## 2023-06-13 PROCEDURE — 37000009 HC ANESTHESIA EA ADD 15 MINS: Performed by: NEUROLOGICAL SURGERY

## 2023-06-13 PROCEDURE — 11000001 HC ACUTE MED/SURG PRIVATE ROOM

## 2023-06-13 PROCEDURE — D9220A PRA ANESTHESIA: Mod: ANES,,, | Performed by: ANESTHESIOLOGY

## 2023-06-13 PROCEDURE — D9220A PRA ANESTHESIA: Mod: CRNA,,, | Performed by: NURSE ANESTHETIST, CERTIFIED REGISTERED

## 2023-06-13 PROCEDURE — 63048 LAM FACETEC &FORAMOT EA ADDL: CPT | Mod: AS,,, | Performed by: PHYSICIAN ASSISTANT

## 2023-06-13 PROCEDURE — 36000710: Performed by: NEUROLOGICAL SURGERY

## 2023-06-13 PROCEDURE — 63048 PR LAMINECT/FACETECT/FORAMINOT, EA ADDTL VERTEBRAL SEGM: ICD-10-PCS | Mod: AS,,, | Performed by: PHYSICIAN ASSISTANT

## 2023-06-13 PROCEDURE — 82962 GLUCOSE BLOOD TEST: CPT | Performed by: NEUROLOGICAL SURGERY

## 2023-06-13 PROCEDURE — 25000003 PHARM REV CODE 250: Performed by: NURSE ANESTHETIST, CERTIFIED REGISTERED

## 2023-06-13 PROCEDURE — 63047 PR LAMINEC/FACETECT/FORAMIN,LUMBAR 1 SEG: ICD-10-PCS | Mod: AS,,, | Performed by: PHYSICIAN ASSISTANT

## 2023-06-13 PROCEDURE — 63600175 PHARM REV CODE 636 W HCPCS: Performed by: NEUROLOGICAL SURGERY

## 2023-06-13 PROCEDURE — 63600175 PHARM REV CODE 636 W HCPCS

## 2023-06-13 PROCEDURE — 25000003 PHARM REV CODE 250: Performed by: NEUROLOGICAL SURGERY

## 2023-06-13 PROCEDURE — 71000039 HC RECOVERY, EACH ADD'L HOUR: Performed by: NEUROLOGICAL SURGERY

## 2023-06-13 RX ORDER — MORPHINE SULFATE 2 MG/ML
2 INJECTION, SOLUTION INTRAMUSCULAR; INTRAVENOUS EVERY 4 HOURS PRN
Status: DISCONTINUED | OUTPATIENT
Start: 2023-06-13 | End: 2023-06-13

## 2023-06-13 RX ORDER — PROCHLORPERAZINE EDISYLATE 5 MG/ML
5 INJECTION INTRAMUSCULAR; INTRAVENOUS ONCE
Status: COMPLETED | OUTPATIENT
Start: 2023-06-13 | End: 2023-06-13

## 2023-06-13 RX ORDER — LEVOTHYROXINE SODIUM 100 UG/1
100 TABLET ORAL
Status: DISCONTINUED | OUTPATIENT
Start: 2023-06-14 | End: 2023-06-14 | Stop reason: HOSPADM

## 2023-06-13 RX ORDER — GLUCAGON 1 MG
1 KIT INJECTION
Status: DISCONTINUED | OUTPATIENT
Start: 2023-06-13 | End: 2023-06-14 | Stop reason: HOSPADM

## 2023-06-13 RX ORDER — PROCHLORPERAZINE EDISYLATE 5 MG/ML
INJECTION INTRAMUSCULAR; INTRAVENOUS
Status: COMPLETED
Start: 2023-06-13 | End: 2023-06-13

## 2023-06-13 RX ORDER — MUPIROCIN 20 MG/G
OINTMENT TOPICAL 2 TIMES DAILY
Status: DISCONTINUED | OUTPATIENT
Start: 2023-06-13 | End: 2023-06-14 | Stop reason: HOSPADM

## 2023-06-13 RX ORDER — PHENYLEPHRINE HYDROCHLORIDE 10 MG/ML
INJECTION INTRAVENOUS
Status: DISCONTINUED | OUTPATIENT
Start: 2023-06-13 | End: 2023-06-13

## 2023-06-13 RX ORDER — FLUOXETINE HYDROCHLORIDE 20 MG/1
20 CAPSULE ORAL DAILY
Status: DISCONTINUED | OUTPATIENT
Start: 2023-06-13 | End: 2023-06-14 | Stop reason: HOSPADM

## 2023-06-13 RX ORDER — DEXAMETHASONE SODIUM PHOSPHATE 4 MG/ML
INJECTION, SOLUTION INTRA-ARTICULAR; INTRALESIONAL; INTRAMUSCULAR; INTRAVENOUS; SOFT TISSUE
Status: DISCONTINUED | OUTPATIENT
Start: 2023-06-13 | End: 2023-06-13

## 2023-06-13 RX ORDER — HYDROCODONE BITARTRATE AND ACETAMINOPHEN 5; 325 MG/1; MG/1
2 TABLET ORAL EVERY 4 HOURS PRN
Status: DISCONTINUED | OUTPATIENT
Start: 2023-06-13 | End: 2023-06-14 | Stop reason: HOSPADM

## 2023-06-13 RX ORDER — FENTANYL CITRATE 50 UG/ML
INJECTION, SOLUTION INTRAMUSCULAR; INTRAVENOUS
Status: DISCONTINUED | OUTPATIENT
Start: 2023-06-13 | End: 2023-06-13

## 2023-06-13 RX ORDER — IBUPROFEN 200 MG
24 TABLET ORAL
Status: DISCONTINUED | OUTPATIENT
Start: 2023-06-13 | End: 2023-06-14 | Stop reason: HOSPADM

## 2023-06-13 RX ORDER — ROCURONIUM BROMIDE 10 MG/ML
INJECTION, SOLUTION INTRAVENOUS
Status: DISCONTINUED | OUTPATIENT
Start: 2023-06-13 | End: 2023-06-13

## 2023-06-13 RX ORDER — PANTOPRAZOLE SODIUM 20 MG/1
40 TABLET, DELAYED RELEASE ORAL 2 TIMES DAILY
Status: DISCONTINUED | OUTPATIENT
Start: 2023-06-13 | End: 2023-06-14 | Stop reason: HOSPADM

## 2023-06-13 RX ORDER — CEFAZOLIN SODIUM 2 G/50ML
2 SOLUTION INTRAVENOUS
Status: DISPENSED | OUTPATIENT
Start: 2023-06-13 | End: 2023-06-14

## 2023-06-13 RX ORDER — METHOCARBAMOL 100 MG/ML
1000 INJECTION, SOLUTION INTRAMUSCULAR; INTRAVENOUS ONCE
Status: COMPLETED | OUTPATIENT
Start: 2023-06-13 | End: 2023-06-13

## 2023-06-13 RX ORDER — ONDANSETRON 2 MG/ML
4 INJECTION INTRAMUSCULAR; INTRAVENOUS DAILY PRN
Status: DISCONTINUED | OUTPATIENT
Start: 2023-06-13 | End: 2023-06-13 | Stop reason: HOSPADM

## 2023-06-13 RX ORDER — ONDANSETRON 2 MG/ML
INJECTION INTRAMUSCULAR; INTRAVENOUS
Status: DISCONTINUED | OUTPATIENT
Start: 2023-06-13 | End: 2023-06-13

## 2023-06-13 RX ORDER — SODIUM CHLORIDE 0.9 % (FLUSH) 0.9 %
10 SYRINGE (ML) INJECTION
Status: DISCONTINUED | OUTPATIENT
Start: 2023-06-13 | End: 2023-06-13 | Stop reason: HOSPADM

## 2023-06-13 RX ORDER — CEFAZOLIN SODIUM 1 G/3ML
INJECTION, POWDER, FOR SOLUTION INTRAMUSCULAR; INTRAVENOUS
Status: DISPENSED
Start: 2023-06-13 | End: 2023-06-13

## 2023-06-13 RX ORDER — KETOROLAC TROMETHAMINE 30 MG/ML
INJECTION, SOLUTION INTRAMUSCULAR; INTRAVENOUS
Status: DISCONTINUED | OUTPATIENT
Start: 2023-06-13 | End: 2023-06-13

## 2023-06-13 RX ORDER — CEFAZOLIN SODIUM 2 G/50ML
SOLUTION INTRAVENOUS
Status: DISPENSED
Start: 2023-06-13 | End: 2023-06-13

## 2023-06-13 RX ORDER — ONDANSETRON 2 MG/ML
4 INJECTION INTRAMUSCULAR; INTRAVENOUS EVERY 8 HOURS PRN
Status: DISCONTINUED | OUTPATIENT
Start: 2023-06-13 | End: 2023-06-14 | Stop reason: HOSPADM

## 2023-06-13 RX ORDER — ATORVASTATIN CALCIUM 10 MG/1
20 TABLET, FILM COATED ORAL DAILY
Status: DISCONTINUED | OUTPATIENT
Start: 2023-06-13 | End: 2023-06-14 | Stop reason: HOSPADM

## 2023-06-13 RX ORDER — CEFAZOLIN SODIUM 1 G/3ML
INJECTION, POWDER, FOR SOLUTION INTRAMUSCULAR; INTRAVENOUS
Status: DISCONTINUED | OUTPATIENT
Start: 2023-06-13 | End: 2023-06-13

## 2023-06-13 RX ORDER — MORPHINE SULFATE 4 MG/ML
2 INJECTION, SOLUTION INTRAMUSCULAR; INTRAVENOUS
Status: DISCONTINUED | OUTPATIENT
Start: 2023-06-13 | End: 2023-06-14 | Stop reason: HOSPADM

## 2023-06-13 RX ORDER — POTASSIUM CHLORIDE 750 MG/1
10 TABLET, EXTENDED RELEASE ORAL DAILY
Status: DISCONTINUED | OUTPATIENT
Start: 2023-06-13 | End: 2023-06-14 | Stop reason: HOSPADM

## 2023-06-13 RX ORDER — METFORMIN HYDROCHLORIDE 500 MG/1
1000 TABLET ORAL 2 TIMES DAILY WITH MEALS
Status: DISCONTINUED | OUTPATIENT
Start: 2023-06-14 | End: 2023-06-14 | Stop reason: HOSPADM

## 2023-06-13 RX ORDER — METHOCARBAMOL 100 MG/ML
INJECTION, SOLUTION INTRAMUSCULAR; INTRAVENOUS
Status: DISPENSED
Start: 2023-06-13 | End: 2023-06-14

## 2023-06-13 RX ORDER — ACETAMINOPHEN 10 MG/ML
INJECTION, SOLUTION INTRAVENOUS
Status: DISCONTINUED | OUTPATIENT
Start: 2023-06-13 | End: 2023-06-13

## 2023-06-13 RX ORDER — TRAZODONE HYDROCHLORIDE 50 MG/1
50 TABLET ORAL NIGHTLY
Status: DISCONTINUED | OUTPATIENT
Start: 2023-06-13 | End: 2023-06-14 | Stop reason: HOSPADM

## 2023-06-13 RX ORDER — METHOCARBAMOL 750 MG/1
750 TABLET, FILM COATED ORAL EVERY 8 HOURS PRN
Status: DISCONTINUED | OUTPATIENT
Start: 2023-06-13 | End: 2023-06-14 | Stop reason: HOSPADM

## 2023-06-13 RX ORDER — GLIMEPIRIDE 4 MG/1
4 TABLET ORAL DAILY
Status: DISCONTINUED | OUTPATIENT
Start: 2023-06-13 | End: 2023-06-14 | Stop reason: HOSPADM

## 2023-06-13 RX ORDER — LIDOCAINE HYDROCHLORIDE AND EPINEPHRINE 5; 5 MG/ML; UG/ML
INJECTION, SOLUTION INFILTRATION; PERINEURAL
Status: DISCONTINUED | OUTPATIENT
Start: 2023-06-13 | End: 2023-06-13 | Stop reason: HOSPADM

## 2023-06-13 RX ORDER — HYDROMORPHONE HYDROCHLORIDE 2 MG/ML
INJECTION, SOLUTION INTRAMUSCULAR; INTRAVENOUS; SUBCUTANEOUS
Status: COMPLETED
Start: 2023-06-13 | End: 2023-06-13

## 2023-06-13 RX ORDER — IBUPROFEN 200 MG
16 TABLET ORAL
Status: DISCONTINUED | OUTPATIENT
Start: 2023-06-13 | End: 2023-06-14 | Stop reason: HOSPADM

## 2023-06-13 RX ORDER — OXYCODONE AND ACETAMINOPHEN 10; 325 MG/1; MG/1
1 TABLET ORAL EVERY 4 HOURS PRN
Status: DISCONTINUED | OUTPATIENT
Start: 2023-06-13 | End: 2023-06-14 | Stop reason: HOSPADM

## 2023-06-13 RX ORDER — INSULIN ASPART 100 [IU]/ML
1-10 INJECTION, SOLUTION INTRAVENOUS; SUBCUTANEOUS
Status: DISCONTINUED | OUTPATIENT
Start: 2023-06-14 | End: 2023-06-14 | Stop reason: HOSPADM

## 2023-06-13 RX ORDER — CEFAZOLIN SODIUM IN 0.9 % NACL 3 G/100 ML
3 INTRAVENOUS SOLUTION, PIGGYBACK (ML) INTRAVENOUS
Status: DISCONTINUED | OUTPATIENT
Start: 2023-06-13 | End: 2023-06-13 | Stop reason: HOSPADM

## 2023-06-13 RX ORDER — AMLODIPINE BESYLATE 5 MG/1
10 TABLET ORAL DAILY
Status: DISCONTINUED | OUTPATIENT
Start: 2023-06-13 | End: 2023-06-14 | Stop reason: HOSPADM

## 2023-06-13 RX ORDER — MIDAZOLAM HYDROCHLORIDE 1 MG/ML
INJECTION INTRAMUSCULAR; INTRAVENOUS
Status: DISCONTINUED | OUTPATIENT
Start: 2023-06-13 | End: 2023-06-13

## 2023-06-13 RX ORDER — PROPOFOL 10 MG/ML
VIAL (ML) INTRAVENOUS
Status: DISCONTINUED | OUTPATIENT
Start: 2023-06-13 | End: 2023-06-13

## 2023-06-13 RX ORDER — HYDROMORPHONE HYDROCHLORIDE 2 MG/ML
0.2 INJECTION, SOLUTION INTRAMUSCULAR; INTRAVENOUS; SUBCUTANEOUS EVERY 5 MIN PRN
Status: DISCONTINUED | OUTPATIENT
Start: 2023-06-13 | End: 2023-06-13

## 2023-06-13 RX ADMIN — HYDROMORPHONE HYDROCHLORIDE 0.2 MG: 2 INJECTION INTRAMUSCULAR; INTRAVENOUS; SUBCUTANEOUS at 01:06

## 2023-06-13 RX ADMIN — OXYCODONE AND ACETAMINOPHEN 1 TABLET: 10; 325 TABLET ORAL at 11:06

## 2023-06-13 RX ADMIN — PROCHLORPERAZINE EDISYLATE 5 MG: 5 INJECTION INTRAMUSCULAR; INTRAVENOUS at 01:06

## 2023-06-13 RX ADMIN — ROCURONIUM BROMIDE 20 MG: 10 SOLUTION INTRAVENOUS at 10:06

## 2023-06-13 RX ADMIN — SODIUM CHLORIDE, SODIUM GLUCONATE, SODIUM ACETATE, POTASSIUM CHLORIDE AND MAGNESIUM CHLORIDE: 526; 502; 368; 37; 30 INJECTION, SOLUTION INTRAVENOUS at 07:06

## 2023-06-13 RX ADMIN — CEFAZOLIN SODIUM 2 G: 2 SOLUTION INTRAVENOUS at 09:06

## 2023-06-13 RX ADMIN — PHENYLEPHRINE HYDROCHLORIDE 100 MCG: 10 INJECTION INTRAVENOUS at 07:06

## 2023-06-13 RX ADMIN — TRAZODONE HYDROCHLORIDE 50 MG: 50 TABLET ORAL at 09:06

## 2023-06-13 RX ADMIN — PROPOFOL 150 MG: 10 INJECTION, EMULSION INTRAVENOUS at 07:06

## 2023-06-13 RX ADMIN — FENTANYL CITRATE 50 MCG: 50 INJECTION, SOLUTION INTRAMUSCULAR; INTRAVENOUS at 09:06

## 2023-06-13 RX ADMIN — METHOCARBAMOL 1000 MG: 100 INJECTION INTRAMUSCULAR; INTRAVENOUS at 01:06

## 2023-06-13 RX ADMIN — FENTANYL CITRATE 25 MCG: 50 INJECTION, SOLUTION INTRAMUSCULAR; INTRAVENOUS at 11:06

## 2023-06-13 RX ADMIN — CEFAZOLIN 2 G: 330 INJECTION, POWDER, FOR SOLUTION INTRAMUSCULAR; INTRAVENOUS at 11:06

## 2023-06-13 RX ADMIN — ONDANSETRON 4 MG: 2 INJECTION INTRAMUSCULAR; INTRAVENOUS at 07:06

## 2023-06-13 RX ADMIN — ROCURONIUM BROMIDE 50 MG: 10 SOLUTION INTRAVENOUS at 07:06

## 2023-06-13 RX ADMIN — FENTANYL CITRATE 100 MCG: 50 INJECTION, SOLUTION INTRAMUSCULAR; INTRAVENOUS at 07:06

## 2023-06-13 RX ADMIN — ACETAMINOPHEN 1000 MG: 10 INJECTION, SOLUTION INTRAVENOUS at 10:06

## 2023-06-13 RX ADMIN — SUGAMMADEX 200 MG: 100 INJECTION, SOLUTION INTRAVENOUS at 12:06

## 2023-06-13 RX ADMIN — SODIUM CHLORIDE, SODIUM GLUCONATE, SODIUM ACETATE, POTASSIUM CHLORIDE AND MAGNESIUM CHLORIDE: 526; 502; 368; 37; 30 INJECTION, SOLUTION INTRAVENOUS at 11:06

## 2023-06-13 RX ADMIN — PANTOPRAZOLE SODIUM 40 MG: 20 TABLET, DELAYED RELEASE ORAL at 09:06

## 2023-06-13 RX ADMIN — DEXAMETHASONE SODIUM PHOSPHATE 8 MG: 4 INJECTION, SOLUTION INTRA-ARTICULAR; INTRALESIONAL; INTRAMUSCULAR; INTRAVENOUS; SOFT TISSUE at 07:06

## 2023-06-13 RX ADMIN — PHENYLEPHRINE HYDROCHLORIDE 1 MCG/KG/MIN: 10 INJECTION INTRAVENOUS at 07:06

## 2023-06-13 RX ADMIN — OXYCODONE AND ACETAMINOPHEN 1 TABLET: 10; 325 TABLET ORAL at 02:06

## 2023-06-13 RX ADMIN — KETOROLAC TROMETHAMINE 30 MG: 30 INJECTION, SOLUTION INTRAMUSCULAR; INTRAVENOUS at 11:06

## 2023-06-13 RX ADMIN — ONDANSETRON 4 MG: 2 INJECTION INTRAMUSCULAR; INTRAVENOUS at 12:06

## 2023-06-13 RX ADMIN — MUPIROCIN: 20 OINTMENT TOPICAL at 09:06

## 2023-06-13 RX ADMIN — ROCURONIUM BROMIDE 30 MG: 10 SOLUTION INTRAVENOUS at 08:06

## 2023-06-13 RX ADMIN — METHOCARBAMOL 750 MG: 750 TABLET ORAL at 11:06

## 2023-06-13 RX ADMIN — MIDAZOLAM HYDROCHLORIDE 2 MG: 1 INJECTION, SOLUTION INTRAMUSCULAR; INTRAVENOUS at 07:06

## 2023-06-13 RX ADMIN — CEFAZOLIN 3 G: 330 INJECTION, POWDER, FOR SOLUTION INTRAMUSCULAR; INTRAVENOUS at 07:06

## 2023-06-13 RX ADMIN — OXYCODONE AND ACETAMINOPHEN 1 TABLET: 10; 325 TABLET ORAL at 07:06

## 2023-06-13 RX ADMIN — FENTANYL CITRATE 25 MCG: 50 INJECTION, SOLUTION INTRAMUSCULAR; INTRAVENOUS at 10:06

## 2023-06-13 NOTE — BRIEF OP NOTE
[facility] - Periop Services  Brief Operative Note    SUMMARY     Surgery Date: 6/13/2023     Surgeon(s) and Role:     * Renny Barclay MD - Primary     * PRADEEP Johnson - Assisting        Pre-op Diagnosis:  Spinal stenosis of lumbar region, unspecified whether neurogenic claudication present [M48.061]  Lumbar radiculopathy [M54.16]    Post-op Diagnosis:  Post-Op Diagnosis Codes:     * Spinal stenosis of lumbar region, unspecified whether neurogenic claudication present [M48.061]     * Lumbar radiculopathy [M54.16]    Procedure(s) (LRB):  DECOMPRESSION, SPINE, LUMBAR, MINIMALLY INVASIVE (Bilateral)    Anesthesia: General    Operative Findings: Patient tolerated procedure well and was transferred to PACU.      Estimated Blood Loss: 200 mL         Specimens:   Specimen (24h ago, onward)      None            [id number]

## 2023-06-13 NOTE — DISCHARGE INSTRUCTIONS
-NO driving and NO alcohol consumption for 24 hours and while taking narcotic pain medications.    -Keep incisions clean and dry for 48 hours. OK to shower afterwards. Do not tub bathe or submerge incision under water.    -[facility] DRAIN CARE: keep bulb FULLY compressed. Empty drain every 4 hours OR as needed OR when contents reached 20mls. RECORD time and outputs. CALL the office in AM with results.    -NO heavy lifting. DO not lift objects greater than 10lbs. Use caution when lifting, bending, pulling, pushing, etc.    -Monitor site for infection: redness, swelling, drainage/pus/foul odor, fever, chills.    -Report to your nearest ER AND/OR notify your provider if you experience any SUDDEN/SEVERE chest/abdominal pain, profound weakness, trouble breathing, uncontrolled pain/bleeding.

## 2023-06-13 NOTE — PROGRESS NOTES
Patient ordered to lay flat until 2:30 pm.  HOB raised to 30 degrees at 14:45. Patient tolerated well. HOB increased to 45 degrees. Patient tolerating well. No c/o HA or nausea.  Nurse updated Alessandra FERNÁNDEZ on patient. PA states okay to DC home if able to ambulate and void.

## 2023-06-13 NOTE — TRANSFER OF CARE
"Anesthesia Transfer of Care Note    Patient: Eren Page    Procedure(s) Performed: Procedure(s) (LRB):  DECOMPRESSION, SPINE, LUMBAR, MINIMALLY INVASIVE (Bilateral)    Patient location: PACU    Anesthesia Type: general    Transport from OR: Transported from OR on room air with adequate spontaneous ventilation    Post pain: adequate analgesia    Post assessment: no apparent anesthetic complications    Post vital signs: stable    Level of consciousness: sedated    Nausea/Vomiting: no nausea/vomiting    Complications: none    Transfer of care protocol was followed      Last vitals:   Visit Vitals  /68   Pulse 67   Temp 36.9 °C (98.4 °F) (Oral)   Resp 19   Ht 6' 1" (1.854 m)   Wt (!) 138 kg (304 lb 3.8 oz)   SpO2 97%   BMI 40.14 kg/m²     "

## 2023-06-13 NOTE — ANESTHESIA PREPROCEDURE EVALUATION
"                                                                                                             06/13/2023  Eren Page is a 63 y.o., male with lumbar back pain for lumbar decompression (minimaly invasive).  He has been seen and optimized by cardiology.  No evidence of cardiac ischemia.  Low risk.  LVEF is normal at 55-61%.  EKG is normal sinus.  Other PMH as outlined here.  He has done well under general anesthesia in the past.    "Eren Page is a 63 y.o. male who presents for neurosurgical evaluation.         Complain of neck pain with HA and pain into bilatearal shoulders.  LBP with pain radiating into the right lateral thigh, hip, lateral LL.  Pain burning aching.  + numbness in bilateral feet/heels.  Drags feet.  Walks about 10-20 paces before has to stop and rest.  Standing worse than walking.    + weakness in both legs.       No bowel or bladder.  + difficulties with balance due to pain.          Past Medical History:   Diagnosis Date    Cervical radiculopathy      Claudication      Depression      Diabetes mellitus      Dizziness      Dorsalgia      Dyslipidemia      Dysphagia 04/2022    History of chest pain      Hypertension      Hypothyroidism      Lumbar radiculopathy      Palpitations      Sleep apnea       CPAP USED    SOB (shortness of breath)      Wears dentures       FULL"           Pre-op Assessment    I have reviewed the Patient Summary Reports.     I have reviewed the Nursing Notes. I have reviewed the NPO Status.   I have reviewed the Medications.     Review of Systems  Anesthesia Hx:  Denies Family Hx of Anesthesia complications.   Denies Personal Hx of Anesthesia complications.   Cardiovascular:   Hypertension    Pulmonary:   Shortness of breath Sleep Apnea    Hepatic/GI:   GERD    Neurological:   Neuromuscular Disease, Headaches    Endocrine:   Diabetes Hypothyroidism    Psych:   Psychiatric History          Physical Exam  General: Well nourished, Cooperative, " Alert and Oriented    Airway:  Mallampati: II   Mouth Opening: Normal  TM Distance: Normal  Tongue: Normal  Neck ROM: Extension Decreased    Dental:  Dentures    Chest/Lungs:  Clear to auscultation, Normal Respiratory Rate    Heart:  Rate: Normal  Rhythm: Regular Rhythm        Anesthesia Plan  Type of Anesthesia, risks & benefits discussed:    Anesthesia Type: Gen ETT  Intra-op Monitoring Plan: Standard ASA Monitors  Post Op Pain Control Plan: multimodal analgesia and IV/PO Opioids PRN  Induction:  IV  Airway Plan: Video, Post-Induction with in-line stabilization  Informed Consent: Informed consent signed with the Patient and all parties understand the risks and agree with anesthesia plan.  All questions answered.   ASA Score: 3  Day of Surgery Review of History & Physical: H&P Update referred to the surgeon/provider.  Anesthesia Plan Notes: Neck to remain in neutral position throughout.    Ready For Surgery From Anesthesia Perspective.     .

## 2023-06-14 VITALS
HEART RATE: 74 BPM | WEIGHT: 304.25 LBS | OXYGEN SATURATION: 99 % | BODY MASS INDEX: 40.32 KG/M2 | TEMPERATURE: 98 F | SYSTOLIC BLOOD PRESSURE: 129 MMHG | HEIGHT: 73 IN | DIASTOLIC BLOOD PRESSURE: 76 MMHG | RESPIRATION RATE: 18 BRPM

## 2023-06-14 LAB
POCT GLUCOSE: 135 MG/DL (ref 70–110)
POCT GLUCOSE: 193 MG/DL (ref 70–110)

## 2023-06-14 PROCEDURE — 97166 OT EVAL MOD COMPLEX 45 MIN: CPT

## 2023-06-14 PROCEDURE — 99024 POSTOP FOLLOW-UP VISIT: CPT | Mod: ,,, | Performed by: NEUROLOGICAL SURGERY

## 2023-06-14 PROCEDURE — 94761 N-INVAS EAR/PLS OXIMETRY MLT: CPT

## 2023-06-14 PROCEDURE — 25000003 PHARM REV CODE 250: Performed by: PHYSICIAN ASSISTANT

## 2023-06-14 PROCEDURE — 97161 PT EVAL LOW COMPLEX 20 MIN: CPT

## 2023-06-14 PROCEDURE — 63600175 PHARM REV CODE 636 W HCPCS: Performed by: PHYSICIAN ASSISTANT

## 2023-06-14 PROCEDURE — 25000003 PHARM REV CODE 250: Performed by: NEUROLOGICAL SURGERY

## 2023-06-14 PROCEDURE — 99024 PR POST-OP FOLLOW-UP VISIT: ICD-10-PCS | Mod: ,,, | Performed by: NEUROLOGICAL SURGERY

## 2023-06-14 PROCEDURE — G0378 HOSPITAL OBSERVATION PER HR: HCPCS

## 2023-06-14 RX ADMIN — POTASSIUM CHLORIDE 10 MEQ: 750 TABLET, FILM COATED, EXTENDED RELEASE ORAL at 08:06

## 2023-06-14 RX ADMIN — LEVOTHYROXINE SODIUM 100 MCG: 100 TABLET ORAL at 05:06

## 2023-06-14 RX ADMIN — METFORMIN HYDROCHLORIDE 1000 MG: 500 TABLET, FILM COATED ORAL at 08:06

## 2023-06-14 RX ADMIN — PANTOPRAZOLE SODIUM 40 MG: 20 TABLET, DELAYED RELEASE ORAL at 09:06

## 2023-06-14 RX ADMIN — FLUOXETINE 20 MG: 20 CAPSULE ORAL at 08:06

## 2023-06-14 RX ADMIN — GLIMEPIRIDE 4 MG: 4 TABLET ORAL at 08:06

## 2023-06-14 RX ADMIN — AMLODIPINE BESYLATE 10 MG: 5 TABLET ORAL at 09:06

## 2023-06-14 RX ADMIN — CEFAZOLIN SODIUM 2 G: 2 SOLUTION INTRAVENOUS at 05:06

## 2023-06-14 RX ADMIN — ATORVASTATIN CALCIUM 20 MG: 10 TABLET, FILM COATED ORAL at 08:06

## 2023-06-14 NOTE — PT/OT/SLP EVAL
Occupational Therapy   Evaluation and Discharge Note    Name: Eren Page  MRN: [id number]  Admitting Diagnosis: Spinal stenosis of lumbar region  Recent Surgery: Procedure(s) (LRB):  DECOMPRESSION, SPINE, LUMBAR, MINIMALLY INVASIVE (Bilateral) 1 Day Post-Op    Recommendations:     Discharge Recommendations: home  Discharge Equipment Recommendations: none  Barriers to discharge:  None    Assessment:     Eren Page is a 63 y.o. male with a medical diagnosis of Spinal stenosis of lumbar region. At this time, patient is functioning at their prior level of function and does not require further acute OT services.     Plan:     During this hospitalization, patient does not require further acute OT services.  Please re-consult if situation changes.    Plan of Care Reviewed with: patient, spouse    Subjective     Chief Complaint: none stated  Patient/Family Comments/goals: wants to go home yesterday    Occupational Profile:  Living Environment: lives in  home with wife and cat, walk in shower  Previous level of function: independent  Roles and Routines:   Equipment Used at home: none (has walker, rollator, canes, crutches, bench in the shower)  Assistance upon Discharge: yes, wife can assist    Pain/Comfort:  Pain Rating 1: 0/10    Patients cultural, spiritual, Latter day conflicts given the current situation:      Objective:     Communicated with: nsg prior to session.  Patient found up in chair with peripheral IV, CHUCKIE drain upon OT entry to room.    General Precautions: Standard,    Orthopedic Precautions: spinal precautions  Braces:    Respiratory Status: Room air   Vital Signs:      Occupational Performance:    Bed Mobility:        Functional Mobility/Transfers:  Sit to stand with SBA/spvn  Functional Mobility: ambulated > household distance with RW with SBA    Activities of Daily Living:  Donned socks with mod assist, states he can perform task at home with modifications, and wife can assist if  "needed    Cognitive/Visual Perceptual:  intact    Physical Exam:  Renetta Hare      AMPA 6 Click ADL:  [facility] Total Score:      Therapeutic Positioning  Risk for acquired pressure injuries is decreased due to ability to get to BSC/toilet with assist.    OT interventions performed during the course of today's session in an effort to prevent and/or reduce acquired pressure injuries:   Education on Pressure Ulcer Prevention provided    OT recommendations for therapeutic positioning throughout hospitalization:   Follow [facility] Pressure Injury Prevention Protocol    Additional Treatment:  As above     Patient Education:  Patient and spouse provided with verbal education regarding OT role/goals/POC, post op precautions, fall prevention, and Discharge/DME recommendations.  Understanding was verbalized.     Patient left up in chair with all lines intact, call button in reach, and wife present    GOALS:   Multidisciplinary Problems       Occupational Therapy Goals       Not on file                    History:     Past Medical History:   Diagnosis Date    Arthritis     Back pain     Cervical radiculopathy     Claudication     Depression     Diabetes mellitus     Dizziness     Dorsalgia     Dyslipidemia     Dysphagia 04/2022    GERD (gastroesophageal reflux disease)     Headache     History of chest pain     History of kidney stones     Hypertension     Hypothyroidism     Insomnia     Irregular heart beat     Lumbar radiculopathy     Neck pain     Obesity     Palpitations     Right hip pain     Right leg pain     Right leg weakness     Sleep apnea     uses CPAp    SOB (shortness of breath)     Spinal stenosis of lumbar region, unspecified whether neurogenic claudication present     Wears dentures     FULL         Past Surgical History:   Procedure Laterality Date    APPENDECTOMY      BACK SURGERY      "20 YRS AGO-LOWER BACK" 2 RODS AND 20 SCREWS     CHOLECYSTOTOMY      COLONOSCOPY      DECOMPRESSION OF LUMBAR SPINE USING MINIMALLY " INVASIVE TECHNIQUE Bilateral 6/13/2023    Procedure: DECOMPRESSION, SPINE, LUMBAR, MINIMALLY INVASIVE;  Surgeon: Renny Barclay MD;  Location: [facility];  Service: Neurosurgery;  Laterality: Bilateral;  bilateral L2-3, L3-4 decompression, right approach (keep neck neutral) //  XX requests Hamilton Lizama, Meaghan TADEO Amanda    TONSILLECTOMY      TOTAL KNEE ARTHROPLASTY Bilateral     VASECTOMY         Time Tracking:     OT Date of Treatment: 06/14/23  OT Start Time: 1015  OT Stop Time: 1030  OT Total Time (min): 15 min    Billable Minutes:Evaluation mod complexity 15 min    6/14/2023

## 2023-06-14 NOTE — PT/OT/SLP EVAL
Physical Therapy Evaluation and Discharge Note    Patient Name:  Eren Page   MRN:  [id number]    Recommendations:     Discharge Recommendations: home  Discharge Equipment Recommendations: none   Barriers to discharge: None    Assessment:     Eren Page is a 63 y.o. male admitted with a medical diagnosis of Spinal stenosis of lumbar region s/p decompression.  Patient tolerated PT evaluation well, mobilizing with supervision for transfers and ambulated >160ft with RW. At this time, patient is functioning at their prior level of function and does not require further acute PT services. Patient in agreement, PT recommending d/c home with family care. PT educated patient on spinal precautions and importance of remaining mobile and continuing to ambulate during hospital stay.     Recent Surgery: Procedure(s) (LRB):  DECOMPRESSION, SPINE, LUMBAR, MINIMALLY INVASIVE (Bilateral) 1 Day Post-Op    Plan:     During this hospitalization, patient does not require further acute PT services.  Please re-consult if situation changes.      Subjective     Chief Complaint: none stated  Patient/Family Comments/goals: to go home  Pain/Comfort:  Pain Rating 1: 0/10    Patients cultural, spiritual, Voodoo conflicts given the current situation: no    Living Environment:  Pt lives with wife in [facility], no steps to enter.  Prior to admission, patients level of function was independent.  Equipment used at home: walker, rolling, cane, straight.  DME owned (not currently used): none.  Upon discharge, patient will have assistance from family.    Objective:     Communicated with RN prior to session.  Patient found up in chair with CHUCKIE drain upon PT entry to room.    General Precautions: Standard,      Orthopedic Precautions:spinal precautions   Braces: N/A  Respiratory Status: Room air      Exams:  Sensation: -       Impaired  light/touch LLE  RLE ROM: WFL  RLE Strength: WFL  LLE ROM: WFL  LLE Strength: WFL    Functional Mobility:  Transfers:   "Sit to Stand:  stand by assistance with no AD  Gait: patient ambulated 150ft with supervision and RW    AM-PAC 6 CLICK MOBILITY  Total Score:24       Treatment and Education:    Patient provided with verbal education regarding PT POC.  Understanding was verbalized.     Patient left up in chair with all lines intact, call button in reach, and RN notified.    GOALS:   Multidisciplinary Problems       Physical Therapy Goals       Not on file                    History:     Past Medical History:   Diagnosis Date    Arthritis     Back pain     Cervical radiculopathy     Claudication     Depression     Diabetes mellitus     Dizziness     Dorsalgia     Dyslipidemia     Dysphagia 04/2022    GERD (gastroesophageal reflux disease)     Headache     History of chest pain     History of kidney stones     Hypertension     Hypothyroidism     Insomnia     Irregular heart beat     Lumbar radiculopathy     Neck pain     Obesity     Palpitations     Right hip pain     Right leg pain     Right leg weakness     Sleep apnea     uses CPAp    SOB (shortness of breath)     Spinal stenosis of lumbar region, unspecified whether neurogenic claudication present     Wears dentures     FULL       Past Surgical History:   Procedure Laterality Date    APPENDECTOMY      BACK SURGERY      "20 YRS AGO-LOWER BACK" 2 RODS AND 20 SCREWS     CHOLECYSTOTOMY      COLONOSCOPY      DECOMPRESSION OF LUMBAR SPINE USING MINIMALLY INVASIVE TECHNIQUE Bilateral 6/13/2023    Procedure: DECOMPRESSION, SPINE, LUMBAR, MINIMALLY INVASIVE;  Surgeon: Renny Barclay MD;  Location: [facility];  Service: Neurosurgery;  Laterality: Bilateral;  bilateral L2-3, L3-4 decompression, right approach (keep neck neutral) //  XX requests Hamilton Lizama, Meaghan TADEO Amanda    TONSILLECTOMY      TOTAL KNEE ARTHROPLASTY Bilateral     VASECTOMY         Time Tracking:     PT Received On: 06/14/23  PT Start Time: 1020     PT Stop Time: 1032  PT Total Time (min): 12 min     Billable Minutes: " Evaluation Low complexity      06/14/2023

## 2023-06-14 NOTE — NURSING
Nurses Note -- 4 Eyes      6/14/2023   12:48 AM      Skin assessed during: Admit      [x] No Altered Skin Integrity Present    [x]Prevention Measures Documented      [] Yes- Altered Skin Integrity Present or Discovered   [] LDA Added if Not in Epic (Describe Wound)   [] New Altered Skin Integrity was Present on Admit and Documented in LDA   [] Wound Image Taken    Wound Care Consulted? No    Attending Nurse:  Citlalli Ibanez RN     Second RN/Staff Member:  Javid

## 2023-06-14 NOTE — PROGRESS NOTES
[facility]  Neurosurgery  Progress Note    Subjective:     Interval History:     POD#1  DECOMPRESSION, SPINE, LUMBAR, MINIMALLY INVASIVE    Doing well.  Pain well controlled.  Vital signs stable.  Eating and drinking.    Urinating and passing flatus.  Has ambulated independently several times.    The surgical incision is clean and dry.    The CHUCKIE drain was emptied by patient's daughter with 30 mL serosanguineous earlier.  Currently there is about 5-10 mL serosanguineous in the drain.     Plan:     Discharge home   Keep CHUCKIE and call office in AM with output. Call if output increases and thins out.   Patient has his pain medication prescriptions.    Safety precautions and fall precautions   Keep follow-up appointment       Post-Op Info:  Procedure(s) (LRB):  DECOMPRESSION, SPINE, LUMBAR, MINIMALLY INVASIVE (Bilateral)   1 Day Post-Op      Medications:  Continuous Infusions:  Scheduled Meds:   amLODIPine  10 mg Oral Daily    atorvastatin  20 mg Oral Daily    FLUoxetine  20 mg Oral Daily    glimepiride  4 mg Oral Daily    levothyroxine  100 mcg Oral Before breakfast    metFORMIN  1,000 mg Oral BID WM    mupirocin   Nasal BID    pantoprazole  40 mg Oral BID    potassium chloride  10 mEq Oral Daily    semaglutide  0.25 mg Subcutaneous Q7 Days    traZODone  50 mg Oral QHS     PRN Meds:dextrose 10%, dextrose 10%, glucagon (human recombinant), glucose, glucose, HYDROcodone-acetaminophen, insulin aspart U-100, methocarbamoL, morphine, ondansetron, oxyCODONE-acetaminophen     Review of Systems  Objective:     Weight: (!) 138 kg (304 lb 3.8 oz)  Body mass index is 40.14 kg/m².  Vital Signs (Most Recent):  Temp: 97.7 °F (36.5 °C) (06/14/23 1508)  Pulse: 74 (06/14/23 1508)  Resp: 18 (06/14/23 1508)  BP: 129/76 (06/14/23 1508)  SpO2: 99 % (06/14/23 1508) Vital Signs (24h Range):  Temp:  [96.9 °F (36.1 °C)-98.1 °F (36.7 °C)] 97.7 °F (36.5 °C)  Pulse:  [63-74] 74  Resp:  [16-19] 18  SpO2:  [94 %-100 %] 99  %  BP: ()/(62-77) 129/76     Date 06/14/23 0700 - 06/15/23 0659   Shift 9160-2484 9765-2395 5969-5737 24 Hour Total   INTAKE   P.O. 960   960   Shift Total(mL/kg) 960(7)   960(7)   OUTPUT   Drains 30   30   Shift Total(mL/kg) 30(0.2)   30(0.2)   Weight (kg) 138 138 138 138                            Closed/Suction Drain 06/13/23 1145 Inferior;Medial Back Bulb 10 Fr. (Active)   Dressing Type Transparent (Tegaderm) 06/14/23 0400   Dressing Status Clean;Dry;Intact 06/14/23 0400   Dressing Intervention First dressing 06/13/23 1432   Drainage Bright red 06/13/23 1432   Status To bulb suction 06/14/23 0400   Output (mL) 30 mL 06/14/23 1235       Neurosurgery Physical Exam    Significant Labs:  No results for input(s): GLU, NA, K, CL, CO2, BUN, CREATININE, CALCIUM, MG in the last 48 hours.  No results for input(s): WBC, HGB, HCT, PLT in the last 48 hours.  No results for input(s): LABPT, INR, APTT in the last 48 hours.  Microbiology Results (last 7 days)       ** No results found for the last 168 hours. **            Active Diagnoses:    Diagnosis Date Noted POA    PRINCIPAL PROBLEM:  Spinal stenosis of lumbar region [M48.061] 06/13/2023 Unknown    Lumbar radiculopathy [M54.16] 06/13/2023 Unknown      Problems Resolved During this Admission:       Rizwana Sykes, [facility]-BC  Neurosurgery  [facility]

## 2023-06-16 ENCOUNTER — TELEPHONE (OUTPATIENT)
Dept: NEUROSURGERY | Facility: CLINIC | Age: 63
End: 2023-06-16
Payer: COMMERCIAL

## 2023-06-16 NOTE — TELEPHONE ENCOUNTER
Keep the drain.  Call with output tomorrow.  They can ask the service to page me.  How is he doing?

## 2023-06-16 NOTE — TELEPHONE ENCOUNTER
Patients wife is calling in with the patients drainage amounts from his surgery on Tuesday. June 15th: 12:30pm>15cc, 06:50pm>30cc, 10:00pm>20cc. June 16th 8:50am>25cc.

## 2023-06-16 NOTE — TELEPHONE ENCOUNTER
I spoke with patient's wife, he is doing very well. A little sore, but good. She will call tomorrow with output.

## 2023-06-21 NOTE — ANESTHESIA POSTPROCEDURE EVALUATION
Anesthesia Post Evaluation    Patient: Eren Page    Procedure(s) Performed: Procedure(s) (LRB):  DECOMPRESSION, SPINE, LUMBAR, MINIMALLY INVASIVE (Bilateral)    Final Anesthesia Type: general      Patient location during evaluation: PACU  Patient participation: Yes- Able to Participate  Level of consciousness: awake and alert  Post-procedure vital signs: reviewed and stable  Pain management: adequate  Airway patency: patent      Anesthetic complications: no      Cardiovascular status: blood pressure returned to baseline  Respiratory status: unassisted  Hydration status: euvolemic  Follow-up not needed.          Vitals Value Taken Time   /62 06/13/23 2045   Temp 36.7 °C (98.1 °F) 06/13/23 2045   Pulse 70 06/13/23 2045   Resp 16 06/13/23 2045   SpO2 95 % 06/13/23 2045         Event Time   Out of Recovery 14:14:00         Pain/Sharifa Score: No data recorded

## 2023-06-21 NOTE — PROGRESS NOTES
[facility]  History & Physical  Neurosurgery      Eren Page   [id number]   1960     SUBJECTIVE:     CHIEF COMPLAINT:  Post-operative visit    HPI:  Eren Page is a 63 y.o. male who presents for a 2 week post-operative follow-up.  He is s/p bilateral L2-3, L3-4 decompression by Dr. Barclay on 6/13/2023.     Prior to surgical intervention the patient reported lower back pain radiating into the right lateral thigh, hip and lateral left lower extremity.  He described his pain as a burning, aching pain with numbness into the bilateral feet and heels.  He found that he would drag his feet.  Standing would aggravate his symptoms.  He would only be able to walk about 10-20 paces before has to stop and rest.  He also describes subjective weakness into the bilateral lower extremities.      The patient returns today reporting the symptoms that are unchanged since surgery.  The patient is complaining of pain in the lower back and into the hips.  Reports with transitioning from sit to stand he is pain radiating into the bilateral lower extremities and knees.  With prolonged standing and walking he will have pressure and pain into the lower back. The patient describes the pain as throbbing, stabbing and aching.    He continues to describe numbness into the left distal lower extremity numbness.  The patient rates the pain 8 on the pain scale today. The patient denies disturbances in bowel or bladder function.  There is no difficulty with balance although states his left leg does feel weak and fell to support him at times.  This has caused him to fall on 2 occasions since surgical intervention.  He continues on with Norco, Mobic and Zanaflex with mediocre improvement.  Describes worsening neck pain as well over the last couple of weeks.  His symptoms can disrupt his sleep. He reports his headaches have resolved since surgical intervention. He denies any fevers. He denies redness, warmth, drainage, swelling or  "tenderness to the surgical site.      Past Medical History:   Diagnosis Date    Arthritis     Back pain     Cervical radiculopathy     Claudication     Depression     Diabetes mellitus     Dizziness     Dorsalgia     Dyslipidemia     Dysphagia 04/2022    GERD (gastroesophageal reflux disease)     Headache     History of chest pain     History of kidney stones     Hypertension     Hypothyroidism     Insomnia     Irregular heart beat     Lumbar radiculopathy     Neck pain     Obesity     Palpitations     Right hip pain     Right leg pain     Right leg weakness     Sleep apnea     uses CPAp    SOB (shortness of breath)     Spinal stenosis of lumbar region, unspecified whether neurogenic claudication present     Wears dentures     FULL       Past Surgical History:   Procedure Laterality Date    APPENDECTOMY      BACK SURGERY      "20 YRS AGO-LOWER BACK" 2 RODS AND 20 SCREWS     CHOLECYSTOTOMY      COLONOSCOPY      DECOMPRESSION OF LUMBAR SPINE USING MINIMALLY INVASIVE TECHNIQUE Bilateral 06/13/2023    L2-3, L3-4 decompression    TONSILLECTOMY      TOTAL KNEE ARTHROPLASTY Bilateral     VASECTOMY         Family History   Problem Relation Age of Onset    Cervical cancer Mother     Heart disease Mother 76    Heart attack Father 82    Colon cancer Father     Cancer Sister     No Known Problems Brother     Cancer Sister     No Known Problems Brother     No Known Problems Brother     No Known Problems Brother     No Known Problems Brother        Social History     Socioeconomic History    Marital status:     Number of children: 4   Tobacco Use    Smoking status: Never    Smokeless tobacco: Never   Substance and Sexual Activity    Alcohol use: Yes     Comment: occasionally    Drug use: Never       Review of patient's allergies indicates:   Allergen Reactions    Adhesive tape-silicones Other (See Comments)     welps and skin breakdown        Current Outpatient Medications   Medication Instructions    amLODIPine (NORVASC) " "10 MG tablet 1 tablet    cholecalciferol, vitamin D3, (VITAMIN D3) 25 mcg (1,000 unit) capsule Daily    FLUoxetine 20 mg, Oral, Daily    glimepiride (AMARYL) 4 mg, Oral, Daily, Take one tablet daily.    HYDROcodone-acetaminophen (NORCO)  mg per tablet 1 tablet, Oral, Every 4 hours PRN    levothyroxine (SYNTHROID) 100 MCG tablet 1 tablet on an empty stomach in the morning    lisinopriL-hydrochlorothiazide (PRINZIDE,ZESTORETIC) 20-25 mg Tab 1 tablet, Oral, Daily    metFORMIN (GLUCOPHAGE) 1,000 mg, Oral, 2 times daily, Take one tablet by mouth twice daily.    orphenadrine (NORFLEX) 100 mg, Oral, 2 times daily    pantoprazole (PROTONIX) 40 mg, Oral, 2 times daily    potassium chloride (KLOR-CON) 10 MEQ TbSR 10 mEq, Oral, Daily    rosuvastatin (CRESTOR) 5 mg, Oral, After dinner    semaglutide (OZEMPIC) 0.25 mg, Subcutaneous, Every 7 days    traZODone (DESYREL) 50 mg, Oral, Nightly    zolpidem (AMBIEN) 10 mg, Oral, Nightly          Review of Systems   Constitutional:  Negative for chills, fever and weight loss.   HENT:  Negative for congestion, hearing loss, nosebleeds and tinnitus.    Eyes:  Negative for blurred vision, double vision and photophobia.   Respiratory:  Negative for cough, shortness of breath and wheezing.    Cardiovascular:  Negative for chest pain, palpitations and leg swelling.   Gastrointestinal:  Negative for constipation, diarrhea, nausea and vomiting.   Genitourinary:  Negative for dysuria, frequency and urgency.   Musculoskeletal:  Positive for back pain, falls and neck pain.   Skin:  Negative for itching and rash.   Neurological:  Negative for dizziness, tingling, tremors, sensory change, speech change, seizures, loss of consciousness and headaches.   Psychiatric/Behavioral:  Negative for depression, hallucinations and memory loss. The patient is not nervous/anxious.        OBJECTIVE:     Physical Exam    Visit Vitals  BP (!) 100/58   Pulse 76   Temp 98.2 °F (36.8 °C)   Resp 16   Ht 6' 1" " (1.854 m)   Wt (!) 138.5 kg (305 lb 6.4 oz)   BMI 40.29 kg/m²        General:  Pleasant, Well-nourished, Well-groomed.    Cardiovascular:  Heart has regular rate and rhythm.    Lungs:  Breathing is quiet, non-lablored    Musculoskeletal:  Incision: healing well, no significant drainage, no dehiscence, no significant erythema.  ROM is Decreased secondary to pain    Neurological:     Muscle strength against resistance:   Right Left   Hip adduction 5/5 5/5   Knee extension (L3) 5/5 5/5   Knee flexion (L4) 5/5 5/5   Dorsiflexion (L5) 5/5 5-/5   EHL (L5) 5/5 5/5   Plantar flexion (S1) 5/5 5/5   Sensation is diminished to light touch in the left L5 dermatome.    Reflexes:   Right Left   Patellar (L4) 0 0   Achilles (S1) 0 0   Negative Babinski, Clonus, Wiseman, Tinel's, and Phalen's bilaterally.  Gait is normal and broad-based  Difficulty with walking on toes with the left lower extremity    Imaging:  All pertinent neuroimaging independently reviewed. Discussed these findings in detail with the patient.    ASSESSMENT:       ICD-10-CM ICD-9-CM   1. Post-operative state  Z98.890 V45.89   2. Lumbar radiculopathy  M54.16 724.4       PLAN:     There are no diagnoses linked to this encounter.  Eren Page returns today reporting pain in the lower back and lower extremities which he feels have not changed since surgical intervention.  Describes left hip pain and neck pain that has progressed since surgical intervention.  At this time I would like the patient to begin some outpatient physical therapy to hopefully alleviate some of his current pains.  We will switch his muscle relaxer to Orphenadrine as he has not had much relief with utilization of tizanidine.  He will continue with his previously prescribed pain medication.  We will plan to see the patient back in clinic for a 6 week postoperative visit.  He was advised to notify our office should his symptoms regress or should he have any additional falls prior to this visit.   This plan was discussed with the patient and his wife and they are in agreement to move forward.    Follow up in about 4 weeks (around 7/24/2023) for 6 week , Post Op, with Deny.     E/M Level Based On Time:   15 minutes spent on reviewing chart, which includes interpreting lab results and diagnostic tests.   25 minutes spent in the room with the patient performing a history and physical exam.   5 minutes spent counseling or educating the patient/caregiver.   5 minutes spent prescribing medications, ordering labwork/diagnostic tests, or placing referrals.   5 minutes spent collaborating plan of care with physician.   5 minutes spent documenting all relevant clinical informationin the electronic health record.     Total Time Spent: 60 minutes       OTIS Avery    Disclaimer:  This note is prepared using voice recognition software and as such is likely to have errors despite attempts at proofreading. Please contact me for questions.

## 2023-06-25 NOTE — OP NOTE
DATE OF OPERATION:   June 13, 2023    PREOPERATIVE DIAGNOSIS:   1.  L2-3, L3-4 central and lateral recess stenosis with neurogenic claudication     POSTOPERATIVE DIAGNOSIS:   1.  L2-3, L3-4 central and lateral recess stenosis with neurogenic claudication     SURGEON:  Renny Barclay M.D.    ASSISTANT: Alessandra Olvera PA-C    A skilled and specially trained assistant such as Alessandra Olvera PA-C was absolutely essential for me to be able to perform a safe, effective and efficient surgery for this patient. The assistant's role in this case included, but was not limited to:  -safely retracting on critical and sensitive structures using an operating microscope at high magnification  -providing accurate intermittent irrigation of the drill bit during drilling with a 60,000 rpm devicae  -using a 7 Icelandic suction to remove blood, debris and irrigation fluid from a subcentimeter working area      PROCEDURE:   1.  Bilateral L2 hemilaminotomies and bilateral L2-3 medial facetectomies and foraminotomies  2.  Bilateral L3 hemilaminotomies and bilateral L3-4 medial facetectomies and foraminotomies  2.  Microdissection for spinal procedure     ANESTHESIA:   General endotracheal     BLOOD LOSS:   200 cc     SPECIMEN(s):   None     DRAINS:   CHUCKIE drain over laminotomy defects     COMPLICATIONS:   None     HISTORY:   The patient is a 63-year-old gentleman with longstanding, but progressively worsening, low back pain with pain radiating into mostly his right lateral thigh, hip and lateral lower leg.  He has burning and aching pain as well as numbness in both his feet.  He drags his feet and has to stop and rest about every 10-20 paces.  Standing is worse than walking.  He also has a history of neck pain and headaches with radiation into both shoulders, but he denies any myelopathic signs.  Imaging studies for the cervical spine showed multilevel stenosis with large anterior bridging osteophytes and some element of OPLL.  Lumbar  imaging showed evidence of previous L4-S1 fusion with well-preserved lordosis.  At L2-3 there was a central calcified disc herniation resulting in severe stenosis and then at L3-4 the stenosis was even more severe, again on a multifactorial basis.. Options were discussed and, after conservative management failed, surgery was elected.  The patient and their family understood and accepted the nature of this surgery as well as its attendant risks.     FINDINGS:   There was a dural tear that was sutured primarily and covered with fibrin glue.  As expected, there was severe stenosis on a multifactorial basis including facet arthropathy, ligamentous buckling and disc bulging/calcified disc herniation.  There was excellent nerve root and thecal sac decompression at the completion of the procedure.    PROCEDURE IN DETAIL:   After endotracheal intubation and induction of general anesthesia, the patient was placed in the prone position on the spinal frame with pressure points appropriately padded.  The frame was cranked up and the back was prepped and draped in the usual fashion.  The patient received intravenous antibiotics prior to the start of the procedure.  The C-arm was used to guide the placement of the initial incision.     A 2 cm incision was made through the skin, subcutaneous tissues and fascia on the appropriate side after infiltrating the skin and muscle with 1/4% Marcaine containing 1/200,000 epinephrine.  Then progressive dilators were inserted down to the lamina and then a 16-18 mm guide tube was put into place and positioned appropriately according to the C-arm.  Then the operating microscope was brought into place.  Muscle was cleared off of the lateral inferior portion of the lamina and then the high speed drill, curette and Kerrison rongeurs were used to create a hemilaminotomy on the ipsilateral side.  The superior articulating facet of the inferior level was then undercut and then the ligamentum flavum  was excised.  Once complete decompression of the lateral recess and proximal foramen was achieved, then the guide tube was angled medially, the base of the spinolaminar junction was removed with the high-speed drill and a combination of curettes and Kerrison rongeurs were then used to decompress the contralateral lateral recess and foramen. Meticulous hemostasis was achieved with a combination of bipolar cautery and hemostatic agent which was removed at completion. The wound was irrigated copiously with antibiotic irrigating solution.  The same procedure was carried out at each additional level.  The dural tear was closed with 6 0 [place]-José Antonio suture which was watertight.  Small amount of fibrin glue was placed over the closure.  A drain was placed over the laminotomy defects and brought out through a separate stab incision.     The guide tube was removed slowly in a rotating fashion with bipolar cautery of the soft tissues and then the fascia was closed with 0 Vicryl and the subcutaneous tissue was closed with 2-0 Vicryl in separate layers.  Dermabond skin glue was used for the skin edges and the patient was returned to the supine position.  The patient was then taken to the post anesthetic care unit in satisfactory condition with correct sponge and needle counts.

## 2023-06-26 ENCOUNTER — OFFICE VISIT (OUTPATIENT)
Dept: NEUROSURGERY | Facility: CLINIC | Age: 63
End: 2023-06-26
Payer: COMMERCIAL

## 2023-06-26 VITALS
RESPIRATION RATE: 16 BRPM | DIASTOLIC BLOOD PRESSURE: 58 MMHG | BODY MASS INDEX: 40.47 KG/M2 | HEIGHT: 73 IN | WEIGHT: 305.38 LBS | SYSTOLIC BLOOD PRESSURE: 100 MMHG | HEART RATE: 76 BPM | TEMPERATURE: 98 F

## 2023-06-26 DIAGNOSIS — Z98.890 POST-OPERATIVE STATE: Primary | ICD-10-CM

## 2023-06-26 DIAGNOSIS — M54.16 LUMBAR RADICULOPATHY: ICD-10-CM

## 2023-06-26 PROCEDURE — 3008F BODY MASS INDEX DOCD: CPT | Mod: CPTII,,, | Performed by: NURSE PRACTITIONER

## 2023-06-26 PROCEDURE — 4010F PR ACE/ARB THEARPY RXD/TAKEN: ICD-10-PCS | Mod: CPTII,,, | Performed by: NURSE PRACTITIONER

## 2023-06-26 PROCEDURE — 99024 POSTOP FOLLOW-UP VISIT: CPT | Mod: ,,, | Performed by: NURSE PRACTITIONER

## 2023-06-26 PROCEDURE — 1159F PR MEDICATION LIST DOCUMENTED IN MEDICAL RECORD: ICD-10-PCS | Mod: CPTII,,, | Performed by: NURSE PRACTITIONER

## 2023-06-26 PROCEDURE — 4010F ACE/ARB THERAPY RXD/TAKEN: CPT | Mod: CPTII,,, | Performed by: NURSE PRACTITIONER

## 2023-06-26 PROCEDURE — 99024 PR POST-OP FOLLOW-UP VISIT: ICD-10-PCS | Mod: ,,, | Performed by: NURSE PRACTITIONER

## 2023-06-26 PROCEDURE — 3078F PR MOST RECENT DIASTOLIC BLOOD PRESSURE < 80 MM HG: ICD-10-PCS | Mod: CPTII,,, | Performed by: NURSE PRACTITIONER

## 2023-06-26 PROCEDURE — 3074F PR MOST RECENT SYSTOLIC BLOOD PRESSURE < 130 MM HG: ICD-10-PCS | Mod: CPTII,,, | Performed by: NURSE PRACTITIONER

## 2023-06-26 PROCEDURE — 3074F SYST BP LT 130 MM HG: CPT | Mod: CPTII,,, | Performed by: NURSE PRACTITIONER

## 2023-06-26 PROCEDURE — 3008F PR BODY MASS INDEX (BMI) DOCUMENTED: ICD-10-PCS | Mod: CPTII,,, | Performed by: NURSE PRACTITIONER

## 2023-06-26 PROCEDURE — 1159F MED LIST DOCD IN RCRD: CPT | Mod: CPTII,,, | Performed by: NURSE PRACTITIONER

## 2023-06-26 PROCEDURE — 3078F DIAST BP <80 MM HG: CPT | Mod: CPTII,,, | Performed by: NURSE PRACTITIONER

## 2023-06-26 RX ORDER — ORPHENADRINE CITRATE 100 MG/1
100 TABLET, EXTENDED RELEASE ORAL 2 TIMES DAILY
Qty: 20 TABLET | Refills: 0 | Status: SHIPPED | OUTPATIENT
Start: 2023-06-26 | End: 2023-07-11

## 2023-07-05 NOTE — DISCHARGE SUMMARY
[facility] - Ortho Neuro  Discharge Note  Short Stay    Procedure(s) (LRB):  DECOMPRESSION, SPINE, LUMBAR, MINIMALLY INVASIVE (Bilateral)      OUTCOME: Condition has improved and patient is now ready for discharge.    DISPOSITION: Home or Self Care    FINAL DIAGNOSIS:  Spinal stenosis of lumbar region    FOLLOWUP: In clinic; keep appt    DISCHARGE INSTRUCTIONS:  No discharge procedures on file.      Clinical Reference Documents Added to Patient Instructions         Document    INTERVERTEBRAL DISCECTOMY DISCHARGE INSTRUCTIONS (ENGLISH)    MAR-RODRIGUES DRAIN (ENGLISH)            TIME SPENT ON DISCHARGE: 5 minutes   I will SWITCH the dose or number of times a day I take the medications listed below when I get home from the hospital:  None

## 2023-07-11 DIAGNOSIS — M48.02 SPINAL STENOSIS, CERVICAL REGION: Primary | ICD-10-CM

## 2023-07-11 DIAGNOSIS — M54.16 LUMBAR RADICULOPATHY: ICD-10-CM

## 2023-07-11 DIAGNOSIS — M48.062 LUMBAR STENOSIS WITH NEUROGENIC CLAUDICATION: ICD-10-CM

## 2023-07-11 DIAGNOSIS — G89.18 ACUTE POSTOPERATIVE PAIN: ICD-10-CM

## 2023-07-11 RX ORDER — HYDROCODONE BITARTRATE AND ACETAMINOPHEN 10; 325 MG/1; MG/1
1 TABLET ORAL 2 TIMES DAILY PRN
Qty: 20 TABLET | Refills: 0 | Status: SHIPPED | OUTPATIENT
Start: 2023-07-11 | End: 2023-07-27 | Stop reason: SDUPTHER

## 2023-07-11 NOTE — TELEPHONE ENCOUNTER
Received a message yesterday morning from the answering service that the patient's wife called Friday at 4:34pm to request a refill of pain medication.  I spoke with them this morning.  He is taking 2/day.  He was last given Norco 10-325mg 1q4h prn #40, 7 day supply on 6/5/23.  He would be due.  He has been out since Friday.  He is s/p bilateral L2-3, L3-4 decompression by Dr. Barclay on 6/13/2023.  He continues with lower back pain and pain into the right hip and lateral leg to the ankle.  He does not feel it has improved much since the surgery.  He rates the pain at an 8.5/10.  He is to start outpatient PT this afternoon.  He was last seen on 6/26 and is to follow up at 6 weeks post op with Dr. Barclay.  It looks like he is in [facility]'s schedule on 7/27.  I will work on moving him to [facility].  He needs cervical surgery as well, will likely finalize plans at that time.  He is taking Norflex for muscle spasms and feels it is helping more than the tizanidine.  I checked Rx history, nothing from anyone else since our first Rx on 6/5.  His pharmacy is [facility] in [place].

## 2023-07-27 ENCOUNTER — OFFICE VISIT (OUTPATIENT)
Dept: NEUROSURGERY | Facility: CLINIC | Age: 63
End: 2023-07-27
Payer: COMMERCIAL

## 2023-07-27 VITALS
SYSTOLIC BLOOD PRESSURE: 127 MMHG | BODY MASS INDEX: 39.1 KG/M2 | HEIGHT: 73 IN | WEIGHT: 295 LBS | DIASTOLIC BLOOD PRESSURE: 79 MMHG | RESPIRATION RATE: 16 BRPM | HEART RATE: 87 BPM

## 2023-07-27 DIAGNOSIS — M48.062 LUMBAR STENOSIS WITH NEUROGENIC CLAUDICATION: ICD-10-CM

## 2023-07-27 DIAGNOSIS — M54.9 DORSALGIA, UNSPECIFIED: ICD-10-CM

## 2023-07-27 DIAGNOSIS — M48.062 SPINAL STENOSIS OF LUMBAR REGION WITH NEUROGENIC CLAUDICATION: Primary | ICD-10-CM

## 2023-07-27 DIAGNOSIS — R13.12 OROPHARYNGEAL DYSPHAGIA: ICD-10-CM

## 2023-07-27 DIAGNOSIS — M48.02 SPINAL STENOSIS, CERVICAL REGION: ICD-10-CM

## 2023-07-27 DIAGNOSIS — M16.11 ARTHRITIS OF RIGHT HIP: ICD-10-CM

## 2023-07-27 PROCEDURE — 3074F PR MOST RECENT SYSTOLIC BLOOD PRESSURE < 130 MM HG: ICD-10-PCS | Mod: CPTII,,, | Performed by: PHYSICIAN ASSISTANT

## 2023-07-27 PROCEDURE — 3074F SYST BP LT 130 MM HG: CPT | Mod: CPTII,,, | Performed by: PHYSICIAN ASSISTANT

## 2023-07-27 PROCEDURE — 3078F DIAST BP <80 MM HG: CPT | Mod: CPTII,,, | Performed by: PHYSICIAN ASSISTANT

## 2023-07-27 PROCEDURE — 1160F PR REVIEW ALL MEDS BY PRESCRIBER/CLIN PHARMACIST DOCUMENTED: ICD-10-PCS | Mod: CPTII,,, | Performed by: PHYSICIAN ASSISTANT

## 2023-07-27 PROCEDURE — 1101F PR PT FALLS ASSESS DOC 0-1 FALLS W/OUT INJ PAST YR: ICD-10-PCS | Mod: CPTII,,, | Performed by: PHYSICIAN ASSISTANT

## 2023-07-27 PROCEDURE — 4010F ACE/ARB THERAPY RXD/TAKEN: CPT | Mod: CPTII,,, | Performed by: PHYSICIAN ASSISTANT

## 2023-07-27 PROCEDURE — 1159F PR MEDICATION LIST DOCUMENTED IN MEDICAL RECORD: ICD-10-PCS | Mod: CPTII,,, | Performed by: PHYSICIAN ASSISTANT

## 2023-07-27 PROCEDURE — 1159F MED LIST DOCD IN RCRD: CPT | Mod: CPTII,,, | Performed by: PHYSICIAN ASSISTANT

## 2023-07-27 PROCEDURE — 3051F HG A1C>EQUAL 7.0%<8.0%: CPT | Mod: CPTII,,, | Performed by: PHYSICIAN ASSISTANT

## 2023-07-27 PROCEDURE — 3288F FALL RISK ASSESSMENT DOCD: CPT | Mod: CPTII,,, | Performed by: PHYSICIAN ASSISTANT

## 2023-07-27 PROCEDURE — 3051F PR MOST RECENT HEMOGLOBIN A1C LEVEL 7.0 - < 8.0%: ICD-10-PCS | Mod: CPTII,,, | Performed by: PHYSICIAN ASSISTANT

## 2023-07-27 PROCEDURE — 99024 PR POST-OP FOLLOW-UP VISIT: ICD-10-PCS | Mod: ,,, | Performed by: PHYSICIAN ASSISTANT

## 2023-07-27 PROCEDURE — 4010F PR ACE/ARB THEARPY RXD/TAKEN: ICD-10-PCS | Mod: CPTII,,, | Performed by: PHYSICIAN ASSISTANT

## 2023-07-27 PROCEDURE — 3288F PR FALLS RISK ASSESSMENT DOCUMENTED: ICD-10-PCS | Mod: CPTII,,, | Performed by: PHYSICIAN ASSISTANT

## 2023-07-27 PROCEDURE — 99024 POSTOP FOLLOW-UP VISIT: CPT | Mod: ,,, | Performed by: PHYSICIAN ASSISTANT

## 2023-07-27 PROCEDURE — 1101F PT FALLS ASSESS-DOCD LE1/YR: CPT | Mod: CPTII,,, | Performed by: PHYSICIAN ASSISTANT

## 2023-07-27 PROCEDURE — 3078F PR MOST RECENT DIASTOLIC BLOOD PRESSURE < 80 MM HG: ICD-10-PCS | Mod: CPTII,,, | Performed by: PHYSICIAN ASSISTANT

## 2023-07-27 PROCEDURE — 3008F PR BODY MASS INDEX (BMI) DOCUMENTED: ICD-10-PCS | Mod: CPTII,,, | Performed by: PHYSICIAN ASSISTANT

## 2023-07-27 PROCEDURE — 1160F RVW MEDS BY RX/DR IN RCRD: CPT | Mod: CPTII,,, | Performed by: PHYSICIAN ASSISTANT

## 2023-07-27 PROCEDURE — 3008F BODY MASS INDEX DOCD: CPT | Mod: CPTII,,, | Performed by: PHYSICIAN ASSISTANT

## 2023-07-27 RX ORDER — CYCLOBENZAPRINE HCL 10 MG
10 TABLET ORAL 3 TIMES DAILY PRN
Qty: 30 TABLET | Refills: 2 | Status: SHIPPED | OUTPATIENT
Start: 2023-07-27 | End: 2023-08-31 | Stop reason: SDUPTHER

## 2023-07-27 RX ORDER — HYDROCODONE BITARTRATE AND ACETAMINOPHEN 10; 325 MG/1; MG/1
1 TABLET ORAL EVERY 8 HOURS PRN
Qty: 21 EACH | Refills: 0 | Status: SHIPPED | OUTPATIENT
Start: 2023-07-27 | End: 2023-08-31 | Stop reason: SDUPTHER

## 2023-07-27 RX ORDER — CYCLOBENZAPRINE HCL 10 MG
10 TABLET ORAL 3 TIMES DAILY PRN
COMMUNITY
Start: 2023-07-12 | End: 2023-07-27 | Stop reason: SDUPTHER

## 2023-07-27 NOTE — PROGRESS NOTES
[facility]  History & Physical  Neurosurgery      Eren Page   [id number]   1960       SUBJECTIVE:     CHIEF COMPLAINT:  Neck, back, and lower extremity pain.      HPI:  Eren Page is a 63 y.o. male who presents for a 6 week postoperative follow-up appointment.  On 06/13/2023, the patient underwent L2-3 and L3-4 decompression.  The patient complains of lower back pain, pain at the right lateral hip radiating into his groin.  He also complains of right knee pain, pain lateral to the knee, and lateral lower leg.  The lateral hip pain is not as severe as it was prior to surgery, but remains.  The pain radiating into his groin is new.  The patient tells me he was seen by Dr. Granados prior to being referred to our office.  He was told that there is degeneration and osteoarthritis in the right hip, but was not the source of his pain at that time.  Walking increases the right lateral hip pain.  He continues with the same lower back pain is prior to surgery which is as expected.  He is experiencing trouble sleeping at night due to his symptoms.  Twenty years ago, he underwent L4-5 and L5-S1 a lift with pedicle screws.  Since that time he has had numbness along the left lateral lower leg and into the 2nd through 5th toes.    In addition, he complains of pain in his mid back that goes up to his neck.  The neck pain is shooting at times to the front of the neck.  He reports pain and decreased range of motion in bilateral shoulders.  He is known to have degenerative changes in both shoulders.    He continues with difficulties with his balance.  He notes he fell in his yd 5 days ago walking on uneven ground.  He continues with difficulty swallowing.  This is due to anterior osteophytes.  He was evaluated by speech therapy with modified barium swallow.  It was recommended that he could take his medications with putting.  Continues to use water.  We discussed changing this method.      Past Medical History:    Diagnosis Date    Arthritis     Back pain     Cervical radiculopathy     Claudication     Depression     Diabetes mellitus     Dizziness     Dorsalgia     Dyslipidemia     Dysphagia 04/2022    GERD (gastroesophageal reflux disease)     Headache     History of chest pain     History of kidney stones     Hypertension     Hypothyroidism     Insomnia     Irregular heart beat     Lumbar radiculopathy     Neck pain     Obesity     Palpitations     Right hip pain     Right leg pain     Right leg weakness     Sleep apnea     uses CPAp    SOB (shortness of breath)     Spinal stenosis of lumbar region, unspecified whether neurogenic claudication present     Wears dentures     FULL       Past Surgical History:   Procedure Laterality Date    APPENDECTOMY      BACK SURGERY  2000    L4-5 and L5-S1 ALIF with pedicle screws.    CHOLECYSTOTOMY      COLONOSCOPY      DECOMPRESSION OF LUMBAR SPINE USING MINIMALLY INVASIVE TECHNIQUE Bilateral 06/13/2023    L2-3, L3-4 decompression.  Dr. Barclay    TONSILLECTOMY      TOTAL KNEE ARTHROPLASTY Bilateral     VASECTOMY         Family History   Problem Relation Age of Onset    Cervical cancer Mother     Heart disease Mother 76    Heart attack Father 82    Colon cancer Father     Cancer Sister     No Known Problems Brother     Cancer Sister     No Known Problems Brother     No Known Problems Brother     No Known Problems Brother     No Known Problems Brother        Social History     Socioeconomic History    Marital status:     Number of children: 4   Tobacco Use    Smoking status: Never    Smokeless tobacco: Never   Substance and Sexual Activity    Alcohol use: Yes     Comment: occasionally    Drug use: Never       Review of patient's allergies indicates:   Allergen Reactions    Adhesive tape-silicones Other (See Comments)     welps and skin breakdown        Current Outpatient Medications   Medication Instructions    amLODIPine (NORVASC) 10 MG tablet 1 tablet    cholecalciferol,  "vitamin D3, (VITAMIN D3) 25 mcg (1,000 unit) capsule Daily    cyclobenzaprine (FLEXERIL) 10 mg, Oral, 3 times daily PRN    FLUoxetine 20 mg, Oral, Daily    glimepiride (AMARYL) 4 mg, Oral, Daily, Take one tablet daily.    HYDROcodone-acetaminophen (NORCO)  mg per tablet 1 tablet, Oral, Every 8 hours PRN    levothyroxine (SYNTHROID) 100 MCG tablet 1 tablet on an empty stomach in the morning    lisinopriL-hydrochlorothiazide (PRINZIDE,ZESTORETIC) 20-25 mg Tab 1 tablet, Oral, Daily    metFORMIN (GLUCOPHAGE) 1,000 mg, Oral, 2 times daily, Take one tablet by mouth twice daily.    pantoprazole (PROTONIX) 40 mg, Oral, 2 times daily    potassium chloride (KLOR-CON) 10 MEQ TbSR 10 mEq, Oral, Daily    rosuvastatin (CRESTOR) 5 mg, Oral, After dinner    semaglutide (OZEMPIC) 0.5 mg, Subcutaneous, Every 7 days    zolpidem (AMBIEN) 10 mg, Oral, Nightly        Review of Systems:    Review of Systems   Constitutional:  Positive for activity change. Negative for chills and fever.   HENT:  Positive for trouble swallowing. Negative for nosebleeds and sore throat.    Eyes:  Negative for pain and visual disturbance.   Respiratory:  Positive for shortness of breath. Negative for cough and chest tightness.    Cardiovascular:  Negative for chest pain.   Gastrointestinal:  Negative for diarrhea, nausea and vomiting.   Genitourinary:  Negative for difficulty urinating, dysuria and hematuria.   Musculoskeletal:  Positive for back pain, gait problem, neck pain and neck stiffness. Negative for myalgias.   Skin:  Negative for rash.   Neurological:  Positive for weakness, numbness and headaches. Negative for dizziness and facial asymmetry.   Psychiatric/Behavioral:  Negative for confusion and sleep disturbance. The patient is not nervous/anxious.         OBJECTIVE:       Visit Vitals  /79 (BP Location: Other (Comment), Patient Position: Sitting)   Pulse 87   Resp 16   Ht 6' 1" (1.854 m)   Wt 133.8 kg (295 lb)   BMI 38.92 kg/m²      "   General:  Pleasant, Well-nourished, Well-groomed.    Lungs:  Breathing is quiet with non-labored respirations.    Musculoskeletal:  Lumbar incision is well healed.    Neurological:    Alert and oriented to person, place, time, and situation.  Muscle strength against resistance:    Iliopsoas Quadriceps Knee  Flexion Tibialis  anterior Gastro- cnemius EHL   Lower: R 4/5 5/5 5/5 5/5 5/5     L 5/5 5/5 5-/5 5/5 5/5    Sensation is intact to primary modalities in bilateral upper extremities with the exception of decreased through the left hand.  There is decreased sensation diffusly below the waist and left-greater-than-right lower extremities.  There is numbness along the left lateral lower leg and foot that has been present since his prior lumbar fusion.  Reflexes:   Right Left   Triceps 2+ 2+   Biceps 2+ 2+   Brachioradialis 1+ 1+   Patellar 0 0   Achilles 0 0   Gait is stiff and antalgic.      Imaging:  All pertinent neuroimaging independently reviewed. Discussed these findings in detail with the patient.      ASSESSMENT:     1. Spinal stenosis of lumbar region with neurogenic claudication  MRI Lumbar Spine W WO Cont    Creatinine, serum    cyclobenzaprine (FLEXERIL) 10 MG tablet    HYDROcodone-acetaminophen (NORCO)  mg per tablet      2. Arthritis of right hip  MRI Hip Without Contrast Right      3. Dorsalgia, unspecified  MRI Lumbar Spine W WO Cont      4. Spinal stenosis, cervical region  cyclobenzaprine (FLEXERIL) 10 MG tablet    HYDROcodone-acetaminophen (NORCO)  mg per tablet    MRI Cervical Spine Without Contrast      5. Lumbar stenosis with neurogenic claudication        6. Oropharyngeal dysphagia           PLAN:     I am concerned the patient feels he is no better since surgery.  We would expect improvement in radiculopathy after the decompression.  The patient reports he did not improve even initially after surgery.  We will obtain lumbar MRI with and without contrast to assess especially with  the new groin pain.  We will also obtain MRI of the right hip.  In addition to his lumbar stenosis, he has cervical spondylosis and stenosis.  Plan was to proceed with cervical laminoplasty after the lumbar procedure.  I will discuss the patient in his situation with Dr. Barclay.      A total of 29 minutes was spent face-to-face with the patient during this encounter.  Over half of that time was spent on counseling and coordination of care.       Marilee Gonzalez PA-C      Disclaimer:  This note is prepared using voice recognition software and as such is likely to have errors despite attempts at proofreading.       The patient and his situation was discussed with Dr. Barclay after the visit.  All of his imaging was reviewed.  He would also like to see a cervical MRI without contrast.  The last scan is old.  He would like to assess the spinal cord and stenosis.

## 2023-08-02 ENCOUNTER — PATIENT MESSAGE (OUTPATIENT)
Dept: NEUROSURGERY | Facility: CLINIC | Age: 63
End: 2023-08-02
Payer: COMMERCIAL

## 2023-08-03 ENCOUNTER — TELEPHONE (OUTPATIENT)
Dept: NEUROSURGERY | Facility: CLINIC | Age: 63
End: 2023-08-03
Payer: COMMERCIAL

## 2023-08-03 NOTE — TELEPHONE ENCOUNTER
Can someone please call and see if they can do his cervical MRI when he goes for the hip and low back?  It looks like he is scheduled at [facility] on 8/16.  Thanks

## 2023-08-03 NOTE — TELEPHONE ENCOUNTER
I called the scheduling dept and they were able to add in his cervical MRI to be done on the same day but since his prev MRI's were scheduled a couple hours apart she was able to change the time to get all of his MRI's done one after the other. I called the patient to inform him of this change and he was agreeable to this new time.

## 2023-08-16 ENCOUNTER — HOSPITAL ENCOUNTER (OUTPATIENT)
Dept: RADIOLOGY | Facility: HOSPITAL | Age: 63
Discharge: HOME | End: 2023-08-16
Attending: PHYSICIAN ASSISTANT
Payer: COMMERCIAL

## 2023-08-16 DIAGNOSIS — M54.9 DORSALGIA, UNSPECIFIED: ICD-10-CM

## 2023-08-16 DIAGNOSIS — M48.02 SPINAL STENOSIS, CERVICAL REGION: ICD-10-CM

## 2023-08-16 DIAGNOSIS — M16.11 ARTHRITIS OF RIGHT HIP: ICD-10-CM

## 2023-08-16 DIAGNOSIS — M48.062 SPINAL STENOSIS OF LUMBAR REGION WITH NEUROGENIC CLAUDICATION: ICD-10-CM

## 2023-08-16 PROCEDURE — 25500020 PHARM REV CODE 255: Performed by: PHYSICIAN ASSISTANT

## 2023-08-16 PROCEDURE — A9577 INJ MULTIHANCE: HCPCS | Performed by: PHYSICIAN ASSISTANT

## 2023-08-16 PROCEDURE — 73721 MRI JNT OF LWR EXTRE W/O DYE: CPT | Mod: TC,RT

## 2023-08-16 PROCEDURE — 72141 MRI NECK SPINE W/O DYE: CPT | Mod: TC

## 2023-08-16 PROCEDURE — 72158 MRI LUMBAR SPINE W/O & W/DYE: CPT | Mod: TC

## 2023-08-16 RX ADMIN — GADOBENATE DIMEGLUMINE 20 ML: 529 INJECTION, SOLUTION INTRAVENOUS at 09:08

## 2023-08-21 ENCOUNTER — OFFICE VISIT (OUTPATIENT)
Dept: NEUROSURGERY | Facility: CLINIC | Age: 63
End: 2023-08-21
Payer: COMMERCIAL

## 2023-08-21 VITALS
HEIGHT: 73 IN | BODY MASS INDEX: 39.1 KG/M2 | DIASTOLIC BLOOD PRESSURE: 77 MMHG | HEART RATE: 99 BPM | WEIGHT: 295 LBS | SYSTOLIC BLOOD PRESSURE: 124 MMHG | RESPIRATION RATE: 16 BRPM

## 2023-08-21 DIAGNOSIS — M47.22 CERVICAL SPONDYLOSIS WITH RADICULOPATHY: ICD-10-CM

## 2023-08-21 DIAGNOSIS — M48.062 SPINAL STENOSIS OF LUMBAR REGION WITH NEUROGENIC CLAUDICATION: Primary | ICD-10-CM

## 2023-08-21 PROCEDURE — 3078F PR MOST RECENT DIASTOLIC BLOOD PRESSURE < 80 MM HG: ICD-10-PCS | Mod: CPTII,,, | Performed by: NEUROLOGICAL SURGERY

## 2023-08-21 PROCEDURE — 3051F PR MOST RECENT HEMOGLOBIN A1C LEVEL 7.0 - < 8.0%: ICD-10-PCS | Mod: CPTII,,, | Performed by: NEUROLOGICAL SURGERY

## 2023-08-21 PROCEDURE — 3288F PR FALLS RISK ASSESSMENT DOCUMENTED: ICD-10-PCS | Mod: CPTII,,, | Performed by: NEUROLOGICAL SURGERY

## 2023-08-21 PROCEDURE — 1101F PT FALLS ASSESS-DOCD LE1/YR: CPT | Mod: CPTII,,, | Performed by: NEUROLOGICAL SURGERY

## 2023-08-21 PROCEDURE — 3074F PR MOST RECENT SYSTOLIC BLOOD PRESSURE < 130 MM HG: ICD-10-PCS | Mod: CPTII,,, | Performed by: NEUROLOGICAL SURGERY

## 2023-08-21 PROCEDURE — 1160F PR REVIEW ALL MEDS BY PRESCRIBER/CLIN PHARMACIST DOCUMENTED: ICD-10-PCS | Mod: CPTII,,, | Performed by: NEUROLOGICAL SURGERY

## 2023-08-21 PROCEDURE — 99215 OFFICE O/P EST HI 40 MIN: CPT | Mod: ,,, | Performed by: NEUROLOGICAL SURGERY

## 2023-08-21 PROCEDURE — 3078F DIAST BP <80 MM HG: CPT | Mod: CPTII,,, | Performed by: NEUROLOGICAL SURGERY

## 2023-08-21 PROCEDURE — 3051F HG A1C>EQUAL 7.0%<8.0%: CPT | Mod: CPTII,,, | Performed by: NEUROLOGICAL SURGERY

## 2023-08-21 PROCEDURE — 1160F RVW MEDS BY RX/DR IN RCRD: CPT | Mod: CPTII,,, | Performed by: NEUROLOGICAL SURGERY

## 2023-08-21 PROCEDURE — 4010F PR ACE/ARB THEARPY RXD/TAKEN: ICD-10-PCS | Mod: CPTII,,, | Performed by: NEUROLOGICAL SURGERY

## 2023-08-21 PROCEDURE — 1159F PR MEDICATION LIST DOCUMENTED IN MEDICAL RECORD: ICD-10-PCS | Mod: CPTII,,, | Performed by: NEUROLOGICAL SURGERY

## 2023-08-21 PROCEDURE — 1159F MED LIST DOCD IN RCRD: CPT | Mod: CPTII,,, | Performed by: NEUROLOGICAL SURGERY

## 2023-08-21 PROCEDURE — 3074F SYST BP LT 130 MM HG: CPT | Mod: CPTII,,, | Performed by: NEUROLOGICAL SURGERY

## 2023-08-21 PROCEDURE — 3008F BODY MASS INDEX DOCD: CPT | Mod: CPTII,,, | Performed by: NEUROLOGICAL SURGERY

## 2023-08-21 PROCEDURE — 3288F FALL RISK ASSESSMENT DOCD: CPT | Mod: CPTII,,, | Performed by: NEUROLOGICAL SURGERY

## 2023-08-21 PROCEDURE — 1101F PR PT FALLS ASSESS DOC 0-1 FALLS W/OUT INJ PAST YR: ICD-10-PCS | Mod: CPTII,,, | Performed by: NEUROLOGICAL SURGERY

## 2023-08-21 PROCEDURE — 3008F PR BODY MASS INDEX (BMI) DOCUMENTED: ICD-10-PCS | Mod: CPTII,,, | Performed by: NEUROLOGICAL SURGERY

## 2023-08-21 PROCEDURE — 4010F ACE/ARB THERAPY RXD/TAKEN: CPT | Mod: CPTII,,, | Performed by: NEUROLOGICAL SURGERY

## 2023-08-21 PROCEDURE — 99215 PR OFFICE/OUTPT VISIT, EST, LEVL V, 40-54 MIN: ICD-10-PCS | Mod: ,,, | Performed by: NEUROLOGICAL SURGERY

## 2023-08-21 NOTE — PROGRESS NOTES
[facility]  History & Physical  Neurosurgery      Eren Page   [id number]   1960       SUBJECTIVE:     CHIEF COMPLAINT:  Neck, back, and lower extremity pain.      HPI:  Eren Page is a 63 y.o. male who presents for follow up for cervical and lumbar complaints.  On 06/13/2023, the patient underwent L2-3 and L3-4 decompression.  The patient complains of lower back pain, pain at the right lateral hip radiating into his groin.  He also complains of right knee pain, pain lateral to the knee, and lateral lower leg.  He has a pins and needles sensation in the right foot towards the end of the day.  The lateral hip pain is not as severe as it was prior to surgery, but remains.  The pain radiating into his groin is new.  The patient tells me he was seen by Dr. Granados prior to being referred to our office.  He was told that there is degeneration and osteoarthritis in the right hip, but was not the source of his pain at that time.  Walking increases the right lateral hip pain.  However, he is able to walk further now without problems that he could before surgery.  He continues with the same lower back pain as prior to surgery which is as expected.  He is experiencing trouble sleeping at night due to his symptoms.  Twenty years ago, he underwent L4-5 and L5-S1 a lift with pedicle screws.  Since that time he has had numbness along the left lateral lower leg and into the 2nd through 5th toes.    In addition, he complains of pain in his mid back that goes up to his neck.  The neck pain is shooting at times to the front of the neck.  He reports pain and decreased range of motion in bilateral shoulders.  He is known to have degenerative changes in both shoulders.    He continues with difficulties with his balance.  He notes he fell in his yard 5 days ago walking on uneven ground.  He continues with difficulty swallowing.  This is due to anterior osteophytes.  He was evaluated by speech therapy with modified  barium swallow.  It was recommended that he could take his medications with pudding.        Past Medical History:   Diagnosis Date    Arthritis     Back pain     Cervical radiculopathy     Claudication     Depression     Diabetes mellitus     Dizziness     Dorsalgia     Dyslipidemia     Dysphagia 04/2022    GERD (gastroesophageal reflux disease)     Headache     History of chest pain     History of kidney stones     Hypertension     Hypothyroidism     Insomnia     Irregular heart beat     Lumbar radiculopathy     Neck pain     Obesity     Palpitations     Right hip pain     Right leg pain     Right leg weakness     Sleep apnea     uses CPAp    SOB (shortness of breath)     Spinal stenosis of lumbar region, unspecified whether neurogenic claudication present     Wears dentures     FULL       Past Surgical History:   Procedure Laterality Date    APPENDECTOMY      BACK SURGERY  2000    L4-5 and L5-S1 ALIF with pedicle screws.    CHOLECYSTOTOMY      COLONOSCOPY      DECOMPRESSION OF LUMBAR SPINE USING MINIMALLY INVASIVE TECHNIQUE Bilateral 06/13/2023    L2-3, L3-4 decompression.  Dr. Barclay    TONSILLECTOMY      TOTAL KNEE ARTHROPLASTY Bilateral     VASECTOMY         Family History   Problem Relation Age of Onset    Cervical cancer Mother     Heart disease Mother 76    Heart attack Father 82    Colon cancer Father     Cancer Sister     No Known Problems Brother     Cancer Sister     No Known Problems Brother     No Known Problems Brother     No Known Problems Brother     No Known Problems Brother        Social History     Socioeconomic History    Marital status:     Number of children: 4   Tobacco Use    Smoking status: Never    Smokeless tobacco: Never   Substance and Sexual Activity    Alcohol use: Yes     Comment: occasionally    Drug use: Never       Review of patient's allergies indicates:   Allergen Reactions    Adhesive tape-silicones Other (See Comments)     welps and skin breakdown        Current  Outpatient Medications   Medication Instructions    amLODIPine (NORVASC) 10 MG tablet 1 tablet    cholecalciferol, vitamin D3, (VITAMIN D3) 25 mcg (1,000 unit) capsule Daily    cyclobenzaprine (FLEXERIL) 10 mg, Oral, 3 times daily PRN    FLUoxetine 20 mg, Oral, Daily    glimepiride (AMARYL) 4 mg, Oral, Daily, Take one tablet daily.    HYDROcodone-acetaminophen (NORCO)  mg per tablet 1 tablet, Oral, Every 8 hours PRN    levothyroxine (SYNTHROID) 100 MCG tablet 1 tablet on an empty stomach in the morning    lisinopriL-hydrochlorothiazide (PRINZIDE,ZESTORETIC) 20-25 mg Tab 1 tablet, Oral, Daily    metFORMIN (GLUCOPHAGE) 1,000 mg, Oral, 2 times daily, Take one tablet by mouth twice daily.    pantoprazole (PROTONIX) 40 mg, Oral, 2 times daily    potassium chloride (KLOR-CON) 10 MEQ TbSR 10 mEq, Oral, Daily    rosuvastatin (CRESTOR) 5 mg, Oral, After dinner    semaglutide (OZEMPIC) 0.5 mg, Subcutaneous, Every 7 days    zolpidem (AMBIEN) 10 mg, Oral, Nightly        Review of Systems:    Review of Systems   Constitutional:  Positive for activity change. Negative for chills and fever.   HENT:  Positive for trouble swallowing. Negative for nosebleeds and sore throat.    Eyes:  Negative for pain and visual disturbance.   Respiratory:  Positive for shortness of breath. Negative for cough and chest tightness.    Cardiovascular:  Negative for chest pain.   Gastrointestinal:  Negative for diarrhea, nausea and vomiting.   Genitourinary:  Negative for difficulty urinating, dysuria and hematuria.   Musculoskeletal:  Positive for back pain, gait problem, neck pain and neck stiffness. Negative for myalgias.   Skin:  Negative for rash.   Neurological:  Positive for weakness, numbness and headaches. Negative for dizziness and facial asymmetry.   Psychiatric/Behavioral:  Negative for confusion and sleep disturbance. The patient is not nervous/anxious.         OBJECTIVE:       Visit Vitals  /77 (BP Location: Other (Comment),  "Patient Position: Sitting)   Pulse 99   Resp 16   Ht 6' 1" (1.854 m)   Wt 133.8 kg (295 lb)   BMI 38.92 kg/m²        General:  Pleasant, Well-nourished, Well-groomed.    Lungs:  Breathing is quiet with non-labored respirations.    Musculoskeletal:  Lumbar incision is well healed.    Neurological:    Alert and oriented to person, place, time, and situation.  Muscle strength against resistance:    Iliopsoas Quadriceps Knee  Flexion Tibialis  anterior Gastro- cnemius EHL   Lower: R 4/5 5/5 5/5 5/5 5/5     L 5/5 5/5 5-/5 5/5 5/5    Sensation is intact to primary modalities in bilateral upper extremities with the exception of decreased through the left hand.  There is decreased sensation diffusly below the waist and left-greater-than-right lower extremities.  There is numbness along the left lateral lower leg and foot that has been present since his prior lumbar fusion.  Reflexes:   Right Left   Triceps 2+ 2+   Biceps 2+ 2+   Brachioradialis 1+ 1+   Patellar 0 0   Achilles 0 0   Gait is stiff and antalgic.    Cervical MRI again shows severe spondylosis and cord compression at C5-6>C4-5>C3-4. There is no abnormal signal within the cord and the findings are similar to the study from 2022. CT shows massive ventral osteophytes and compression/deviation of the esophagus. MBS shows no aspiration, but some oropharyngeal dysphagia.    Options were discussed and he would to go ahead with cervical laminoplasty followed by anterior osteophytectomy C2-C7.    ASSESSMENTPLAN:     1. Spinal stenosis of lumbar region with neurogenic claudication    2. Cervical spondylosis with radiculopathy    - Recommend follow up with Dr. Granados for right hip; patient was given disc w/ MRI hip  - C3-7 laminoplasty, removal of anterior osteophytes C2-C7     I, Dr. Renny Barclay, personally performed the services described in this documentation. All medical record entries made by the scribe, Marguerite Nascimento RN, were at my direction and in my presence.  I " have reviewed the chart and agree that the record reflects my personal performance and is accurate and complete. Renny Barclay MD.  1:29 PM 08/21/2023       Renny Barclay MD FACS [facility]

## 2023-08-31 ENCOUNTER — OFFICE VISIT (OUTPATIENT)
Dept: NEUROSURGERY | Facility: CLINIC | Age: 63
End: 2023-08-31
Payer: COMMERCIAL

## 2023-08-31 VITALS
RESPIRATION RATE: 16 BRPM | BODY MASS INDEX: 38.83 KG/M2 | TEMPERATURE: 98 F | SYSTOLIC BLOOD PRESSURE: 113 MMHG | HEART RATE: 76 BPM | DIASTOLIC BLOOD PRESSURE: 73 MMHG | HEIGHT: 73 IN | WEIGHT: 293 LBS

## 2023-08-31 DIAGNOSIS — M48.02 SPINAL STENOSIS, CERVICAL REGION: ICD-10-CM

## 2023-08-31 DIAGNOSIS — M47.22 CERVICAL SPONDYLOSIS WITH RADICULOPATHY: Primary | ICD-10-CM

## 2023-08-31 DIAGNOSIS — Z01.818 PREOPERATIVE TESTING: ICD-10-CM

## 2023-08-31 DIAGNOSIS — M48.062 SPINAL STENOSIS OF LUMBAR REGION WITH NEUROGENIC CLAUDICATION: ICD-10-CM

## 2023-08-31 PROCEDURE — 1101F PR PT FALLS ASSESS DOC 0-1 FALLS W/OUT INJ PAST YR: ICD-10-PCS | Mod: CPTII,,, | Performed by: PHYSICIAN ASSISTANT

## 2023-08-31 PROCEDURE — 1159F MED LIST DOCD IN RCRD: CPT | Mod: CPTII,,, | Performed by: PHYSICIAN ASSISTANT

## 2023-08-31 PROCEDURE — 1101F PT FALLS ASSESS-DOCD LE1/YR: CPT | Mod: CPTII,,, | Performed by: PHYSICIAN ASSISTANT

## 2023-08-31 PROCEDURE — 1159F PR MEDICATION LIST DOCUMENTED IN MEDICAL RECORD: ICD-10-PCS | Mod: CPTII,,, | Performed by: PHYSICIAN ASSISTANT

## 2023-08-31 PROCEDURE — 3288F FALL RISK ASSESSMENT DOCD: CPT | Mod: CPTII,,, | Performed by: PHYSICIAN ASSISTANT

## 2023-08-31 PROCEDURE — 3008F PR BODY MASS INDEX (BMI) DOCUMENTED: ICD-10-PCS | Mod: CPTII,,, | Performed by: PHYSICIAN ASSISTANT

## 2023-08-31 PROCEDURE — 99024 POSTOP FOLLOW-UP VISIT: CPT | Mod: ,,, | Performed by: PHYSICIAN ASSISTANT

## 2023-08-31 PROCEDURE — 1160F RVW MEDS BY RX/DR IN RCRD: CPT | Mod: CPTII,,, | Performed by: PHYSICIAN ASSISTANT

## 2023-08-31 PROCEDURE — 3078F PR MOST RECENT DIASTOLIC BLOOD PRESSURE < 80 MM HG: ICD-10-PCS | Mod: CPTII,,, | Performed by: PHYSICIAN ASSISTANT

## 2023-08-31 PROCEDURE — 3078F DIAST BP <80 MM HG: CPT | Mod: CPTII,,, | Performed by: PHYSICIAN ASSISTANT

## 2023-08-31 PROCEDURE — 3051F PR MOST RECENT HEMOGLOBIN A1C LEVEL 7.0 - < 8.0%: ICD-10-PCS | Mod: CPTII,,, | Performed by: PHYSICIAN ASSISTANT

## 2023-08-31 PROCEDURE — 3074F SYST BP LT 130 MM HG: CPT | Mod: CPTII,,, | Performed by: PHYSICIAN ASSISTANT

## 2023-08-31 PROCEDURE — 3008F BODY MASS INDEX DOCD: CPT | Mod: CPTII,,, | Performed by: PHYSICIAN ASSISTANT

## 2023-08-31 PROCEDURE — 4010F ACE/ARB THERAPY RXD/TAKEN: CPT | Mod: CPTII,,, | Performed by: PHYSICIAN ASSISTANT

## 2023-08-31 PROCEDURE — 3051F HG A1C>EQUAL 7.0%<8.0%: CPT | Mod: CPTII,,, | Performed by: PHYSICIAN ASSISTANT

## 2023-08-31 PROCEDURE — 4010F PR ACE/ARB THEARPY RXD/TAKEN: ICD-10-PCS | Mod: CPTII,,, | Performed by: PHYSICIAN ASSISTANT

## 2023-08-31 PROCEDURE — 1160F PR REVIEW ALL MEDS BY PRESCRIBER/CLIN PHARMACIST DOCUMENTED: ICD-10-PCS | Mod: CPTII,,, | Performed by: PHYSICIAN ASSISTANT

## 2023-08-31 PROCEDURE — 99024 PR POST-OP FOLLOW-UP VISIT: ICD-10-PCS | Mod: ,,, | Performed by: PHYSICIAN ASSISTANT

## 2023-08-31 PROCEDURE — 3074F PR MOST RECENT SYSTOLIC BLOOD PRESSURE < 130 MM HG: ICD-10-PCS | Mod: CPTII,,, | Performed by: PHYSICIAN ASSISTANT

## 2023-08-31 PROCEDURE — 3288F PR FALLS RISK ASSESSMENT DOCUMENTED: ICD-10-PCS | Mod: CPTII,,, | Performed by: PHYSICIAN ASSISTANT

## 2023-08-31 RX ORDER — IBUPROFEN AND FAMOTIDINE 26.6; 8 MG/1; MG/1
1 TABLET ORAL DAILY
COMMUNITY

## 2023-08-31 RX ORDER — HYDROCODONE BITARTRATE AND ACETAMINOPHEN 10; 325 MG/1; MG/1
1 TABLET ORAL EVERY 8 HOURS PRN
Qty: 21 TABLET | Refills: 0 | Status: ON HOLD | OUTPATIENT
Start: 2023-08-31 | End: 2023-09-15 | Stop reason: SDUPTHER

## 2023-08-31 RX ORDER — NALTREXONE HYDROCHLORIDE AND BUPROPION HYDROCHLORIDE 8; 90 MG/1; MG/1
TABLET, EXTENDED RELEASE ORAL
Status: ON HOLD | COMMUNITY
End: 2024-02-25 | Stop reason: HOSPADM

## 2023-08-31 RX ORDER — CYCLOBENZAPRINE HCL 10 MG
10 TABLET ORAL 3 TIMES DAILY PRN
Qty: 30 TABLET | Refills: 2 | Status: SHIPPED | OUTPATIENT
Start: 2023-08-31

## 2023-08-31 RX ORDER — OXYCODONE AND ACETAMINOPHEN 10; 325 MG/1; MG/1
1 TABLET ORAL EVERY 4 HOURS PRN
COMMUNITY
End: 2023-08-31 | Stop reason: ALTCHOICE

## 2023-08-31 RX ORDER — FUROSEMIDE 40 MG/1
40 TABLET ORAL 2 TIMES DAILY
COMMUNITY

## 2023-08-31 NOTE — H&P (VIEW-ONLY)
[facility]  History & Physical  Neurosurgery      Eren Page   [id number]   1960       SUBJECTIVE:     CHIEF COMPLAINT:  Neck, back, and lower extremity pain.      HPI:  Eren Page is a 63 y.o. male who presents for preoperative evaluation.  He was seen last by Dr. Barclay on 8/21/2023.  On 06/13/2023, the patient underwent L2-3 and L3-4 decompression.  The patient complains of lower back pain, pain at the right lateral hip radiating into his groin.  He also complains of right knee pain, pain lateral to the knee, and lateral lower leg.  He has a pins and needles sensation in the right foot towards the end of the day.  The lateral hip pain is not as severe as it was prior to surgery, but remains.  The pain radiating into his groin is new.  Walking increases the right lateral hip pain.  However, he is able to walk further now without problems that he could before surgery.  He continues with the same lower back pain as prior to surgery which is as expected.  He is experiencing trouble sleeping at night due to his symptoms.  Twenty years ago, he underwent L4-5 and L5-S1 ALIF with pedicle screws.  Since that time, he has had numbness along the left lateral lower leg and into the 2nd through 5th toes.     In addition, he complains of pain in his mid back that goes up to his neck.  The neck pain is shooting at times to the front of the neck.  He reports pain and decreased range of motion in bilateral shoulders.  He is known to have degenerative changes in both shoulders.     He continues with difficulties with his balance.  He notes he fell in his yard 5 days ago walking on uneven ground.  He continues with difficulty swallowing.  This is due to anterior osteophytes.  He was evaluated by speech therapy with modified barium swallow.  It was recommended that he could take his medications with pudding.      Since his last visit, he was evaluated by Dr. Granados.  Right total hip arthroplasty was recommended  after he recovers from the neck surgery.      Past Medical History:   Diagnosis Date    Arthritis     Back pain     Cervical radiculopathy     Claudication     Depression     Diabetes mellitus     Dizziness     Dorsalgia     Dyslipidemia     Dysphagia 04/2022    GERD (gastroesophageal reflux disease)     Headache     History of chest pain     History of kidney stones     Hypertension     Hypothyroidism     Insomnia     Irregular heart beat     Lumbar radiculopathy     Neck pain     Obesity     Palpitations     Right hip pain     Right leg pain     Right leg weakness     Sleep apnea     uses CPAp    SOB (shortness of breath)     Spinal stenosis of lumbar region, unspecified whether neurogenic claudication present     Wears dentures     FULL       Past Surgical History:   Procedure Laterality Date    APPENDECTOMY      BACK SURGERY  2000    L4-5 and L5-S1 ALIF with pedicle screws.    CHOLECYSTOTOMY      COLONOSCOPY      DECOMPRESSION OF LUMBAR SPINE USING MINIMALLY INVASIVE TECHNIQUE Bilateral 06/13/2023    L2-3, L3-4 decompression.  Dr. Barclay    TONSILLECTOMY      TOTAL KNEE ARTHROPLASTY Bilateral     VASECTOMY         Family History   Problem Relation Age of Onset    Cervical cancer Mother     Heart disease Mother 76    Heart attack Father 82    Colon cancer Father     Cancer Sister     No Known Problems Brother     Cancer Sister     No Known Problems Brother     No Known Problems Brother     No Known Problems Brother     No Known Problems Brother        Social History     Socioeconomic History    Marital status:     Number of children: 4   Tobacco Use    Smoking status: Never    Smokeless tobacco: Never   Substance and Sexual Activity    Alcohol use: Yes     Comment: occasionally    Drug use: Never        Review of patient's allergies indicates:   Allergen Reactions    Adhesive tape-silicones Other (See Comments)     welps and skin breakdown        Current Outpatient Medications   Medication Instructions     amLODIPine (NORVASC) 10 MG tablet 1 tablet    cholecalciferol, vitamin D3, (VITAMIN D3) 25 mcg (1,000 unit) capsule Daily    cyclobenzaprine (FLEXERIL) 10 mg, Oral, 3 times daily PRN    FLUoxetine 20 mg, Oral, Daily    furosemide (LASIX) 40 mg, Oral, 2 times daily    glimepiride (AMARYL) 4 mg, Oral, Daily, Take one tablet daily.    HYDROcodone-acetaminophen (NORCO)  mg per tablet 1 tablet, Oral, Every 8 hours PRN    ibuprofen-famotidine (DUEXIS) 800-26.6 mg Tab Oral    levothyroxine (SYNTHROID) 100 MCG tablet 1 tablet on an empty stomach in the morning    lisinopriL-hydrochlorothiazide (PRINZIDE,ZESTORETIC) 20-25 mg Tab 1 tablet, Oral, Daily    metFORMIN (GLUCOPHAGE) 1,000 mg, Oral, 2 times daily, Take one tablet by mouth twice daily.    naltrexone-bupropion (CONTRAVE) 8-90 mg TbSR Oral    pantoprazole (PROTONIX) 40 mg, Oral, 2 times daily    potassium chloride (KLOR-CON) 10 MEQ TbSR 10 mEq, Oral, Daily    rosuvastatin (CRESTOR) 5 mg, Oral, After dinner    semaglutide (OZEMPIC) 0.5 mg, Subcutaneous, Every 7 days        Review of Systems:     Review of Systems   Constitutional:  Positive for activity change. Negative for chills and fever.   HENT:  Positive for trouble swallowing. Negative for nosebleeds and sore throat.    Eyes:  Negative for pain and visual disturbance.   Respiratory:  Positive for shortness of breath. Negative for cough and chest tightness.    Cardiovascular:  Negative for chest pain.   Gastrointestinal:  Negative for diarrhea, nausea and vomiting.   Genitourinary:  Negative for difficulty urinating, dysuria and hematuria.   Musculoskeletal:  Positive for back pain, gait problem, neck pain and neck stiffness. Negative for myalgias.   Skin:  Negative for rash.   Neurological:  Positive for weakness, numbness and headaches. Negative for dizziness and facial asymmetry.   Psychiatric/Behavioral:  Negative for confusion and sleep disturbance. The patient is not nervous/anxious.        OBJECTIVE:  "      Visit Vitals  /73   Pulse 76   Temp 98.1 °F (36.7 °C)   Resp 16   Ht 6' 1" (1.854 m)   Wt 132.9 kg (293 lb)   BMI 38.66 kg/m²        General:  Pleasant, Well-groomed, Overweight.    CV:  Neck is supple.  There are no carotid bruits.  Heart has regular rate and rhythm.    Lungs:  Lungs are clear to auscultation bilaterally with non-labored respirations.    Abdomen:  Soft, non-tender, non-distended.    Neurological:    Oriented to Person, Place, Time   Speech:  normal  Memory, cognition, and affect are appropriate.     Muscle strength against resistance:     Strength   Deltoids Triceps Biceps Wrist Extension Intrinsics Hand    Upper: R 5/5 5/5 5/5 5/5 5/5 5/5     L 5/5 5-/5 5/5 5/5 5/5 5/5      Muscle strength against resistance:     Strength   Hip Flexors Knee extensor Knee Flexion Ankle Dorsiflexion Plantar Flexion EHL Hip Adductor   Lower: R 5/5 5/5 5/5 5/5 5/5 5/5 5/5     L 5/5 5/5 5/5 5-/5 5/5 5/5 5/5         Reflexes:    Right Left   Triceps 2+ 2+   Biceps 2+ 2+   Brachioradialis 1+ 1+   Patellar 0 0   Achilles 0 0      Decreased sensation: left hand (C6, C7, C8), below waist diffusely L>R, left lower leg and foot     Wiseman: negative (both)     Gait:  Stiff, antalgic, and limping     Coordination:  Unable to walk on heels & toes d/t L>R weakness        Musculoskeletal:   Cervical ROM: Pain in extension. Increases swallowing difficulty  There is a small scratch involving the skin of upper back just left of midline.      Imaging:  All pertinent neuroimaging independently reviewed. Discussed these findings in detail with the patient.      ASSESSMENT:       1. Cervical spondylosis with radiculopathy  CBC Auto Differential    Comprehensive Metabolic Panel      2. Preoperative testing  CBC Auto Differential    Comprehensive Metabolic Panel      3. Spinal stenosis of lumbar region with neurogenic claudication  HYDROcodone-acetaminophen (NORCO)  mg per tablet    cyclobenzaprine (FLEXERIL) 10 MG " tablet      4. Spinal stenosis, cervical region  HYDROcodone-acetaminophen (NORCO)  mg per tablet    cyclobenzaprine (FLEXERIL) 10 MG tablet           PLAN:     Options were once again discussed with the patient.  Risks, benefits, and possible complications were discussed.  All the patient's questions were answered.  Plan is for C3 through C7 laminoplasty and removal of C2 through C7 anterior osteophytes.  This is tentatively scheduled for 09/14/2023.  Consents were signed at this time.      Marilee Gonzalez PA-C      Disclaimer:  This note is prepared using voice recognition software and as such is likely to have errors despite attempts at proofreading.

## 2023-08-31 NOTE — PATIENT INSTRUCTIONS
Nothing to eat or drink after midnight the morning of surgery.  Okay to take the pain medication the morning surgery with a sip of water.

## 2023-08-31 NOTE — PROGRESS NOTES
[facility]  History & Physical  Neurosurgery      Eren Page   [id number]   1960       SUBJECTIVE:     CHIEF COMPLAINT:  Neck, back, and lower extremity pain.      HPI:  Eren Page is a 63 y.o. male who presents for preoperative evaluation.  He was seen last by Dr. Barclay on 8/21/2023.  On 06/13/2023, the patient underwent L2-3 and L3-4 decompression.  The patient complains of lower back pain, pain at the right lateral hip radiating into his groin.  He also complains of right knee pain, pain lateral to the knee, and lateral lower leg.  He has a pins and needles sensation in the right foot towards the end of the day.  The lateral hip pain is not as severe as it was prior to surgery, but remains.  The pain radiating into his groin is new.  Walking increases the right lateral hip pain.  However, he is able to walk further now without problems that he could before surgery.  He continues with the same lower back pain as prior to surgery which is as expected.  He is experiencing trouble sleeping at night due to his symptoms.  Twenty years ago, he underwent L4-5 and L5-S1 ALIF with pedicle screws.  Since that time, he has had numbness along the left lateral lower leg and into the 2nd through 5th toes.     In addition, he complains of pain in his mid back that goes up to his neck.  The neck pain is shooting at times to the front of the neck.  He reports pain and decreased range of motion in bilateral shoulders.  He is known to have degenerative changes in both shoulders.     He continues with difficulties with his balance.  He notes he fell in his yard 5 days ago walking on uneven ground.  He continues with difficulty swallowing.  This is due to anterior osteophytes.  He was evaluated by speech therapy with modified barium swallow.  It was recommended that he could take his medications with pudding.      Since his last visit, he was evaluated by Dr. Granados.  Right total hip arthroplasty was recommended  after he recovers from the neck surgery.      Past Medical History:   Diagnosis Date    Arthritis     Back pain     Cervical radiculopathy     Claudication     Depression     Diabetes mellitus     Dizziness     Dorsalgia     Dyslipidemia     Dysphagia 04/2022    GERD (gastroesophageal reflux disease)     Headache     History of chest pain     History of kidney stones     Hypertension     Hypothyroidism     Insomnia     Irregular heart beat     Lumbar radiculopathy     Neck pain     Obesity     Palpitations     Right hip pain     Right leg pain     Right leg weakness     Sleep apnea     uses CPAp    SOB (shortness of breath)     Spinal stenosis of lumbar region, unspecified whether neurogenic claudication present     Wears dentures     FULL       Past Surgical History:   Procedure Laterality Date    APPENDECTOMY      BACK SURGERY  2000    L4-5 and L5-S1 ALIF with pedicle screws.    CHOLECYSTOTOMY      COLONOSCOPY      DECOMPRESSION OF LUMBAR SPINE USING MINIMALLY INVASIVE TECHNIQUE Bilateral 06/13/2023    L2-3, L3-4 decompression.  Dr. Barclay    TONSILLECTOMY      TOTAL KNEE ARTHROPLASTY Bilateral     VASECTOMY         Family History   Problem Relation Age of Onset    Cervical cancer Mother     Heart disease Mother 76    Heart attack Father 82    Colon cancer Father     Cancer Sister     No Known Problems Brother     Cancer Sister     No Known Problems Brother     No Known Problems Brother     No Known Problems Brother     No Known Problems Brother        Social History     Socioeconomic History    Marital status:     Number of children: 4   Tobacco Use    Smoking status: Never    Smokeless tobacco: Never   Substance and Sexual Activity    Alcohol use: Yes     Comment: occasionally    Drug use: Never        Review of patient's allergies indicates:   Allergen Reactions    Adhesive tape-silicones Other (See Comments)     welps and skin breakdown        Current Outpatient Medications   Medication Instructions     amLODIPine (NORVASC) 10 MG tablet 1 tablet    cholecalciferol, vitamin D3, (VITAMIN D3) 25 mcg (1,000 unit) capsule Daily    cyclobenzaprine (FLEXERIL) 10 mg, Oral, 3 times daily PRN    FLUoxetine 20 mg, Oral, Daily    furosemide (LASIX) 40 mg, Oral, 2 times daily    glimepiride (AMARYL) 4 mg, Oral, Daily, Take one tablet daily.    HYDROcodone-acetaminophen (NORCO)  mg per tablet 1 tablet, Oral, Every 8 hours PRN    ibuprofen-famotidine (DUEXIS) 800-26.6 mg Tab Oral    levothyroxine (SYNTHROID) 100 MCG tablet 1 tablet on an empty stomach in the morning    lisinopriL-hydrochlorothiazide (PRINZIDE,ZESTORETIC) 20-25 mg Tab 1 tablet, Oral, Daily    metFORMIN (GLUCOPHAGE) 1,000 mg, Oral, 2 times daily, Take one tablet by mouth twice daily.    naltrexone-bupropion (CONTRAVE) 8-90 mg TbSR Oral    pantoprazole (PROTONIX) 40 mg, Oral, 2 times daily    potassium chloride (KLOR-CON) 10 MEQ TbSR 10 mEq, Oral, Daily    rosuvastatin (CRESTOR) 5 mg, Oral, After dinner    semaglutide (OZEMPIC) 0.5 mg, Subcutaneous, Every 7 days        Review of Systems:     Review of Systems   Constitutional:  Positive for activity change. Negative for chills and fever.   HENT:  Positive for trouble swallowing. Negative for nosebleeds and sore throat.    Eyes:  Negative for pain and visual disturbance.   Respiratory:  Positive for shortness of breath. Negative for cough and chest tightness.    Cardiovascular:  Negative for chest pain.   Gastrointestinal:  Negative for diarrhea, nausea and vomiting.   Genitourinary:  Negative for difficulty urinating, dysuria and hematuria.   Musculoskeletal:  Positive for back pain, gait problem, neck pain and neck stiffness. Negative for myalgias.   Skin:  Negative for rash.   Neurological:  Positive for weakness, numbness and headaches. Negative for dizziness and facial asymmetry.   Psychiatric/Behavioral:  Negative for confusion and sleep disturbance. The patient is not nervous/anxious.        OBJECTIVE:  "      Visit Vitals  /73   Pulse 76   Temp 98.1 °F (36.7 °C)   Resp 16   Ht 6' 1" (1.854 m)   Wt 132.9 kg (293 lb)   BMI 38.66 kg/m²        General:  Pleasant, Well-groomed, Overweight.    CV:  Neck is supple.  There are no carotid bruits.  Heart has regular rate and rhythm.    Lungs:  Lungs are clear to auscultation bilaterally with non-labored respirations.    Abdomen:  Soft, non-tender, non-distended.    Neurological:    Oriented to Person, Place, Time   Speech:  normal  Memory, cognition, and affect are appropriate.     Muscle strength against resistance:     Strength   Deltoids Triceps Biceps Wrist Extension Intrinsics Hand    Upper: R 5/5 5/5 5/5 5/5 5/5 5/5     L 5/5 5-/5 5/5 5/5 5/5 5/5      Muscle strength against resistance:     Strength   Hip Flexors Knee extensor Knee Flexion Ankle Dorsiflexion Plantar Flexion EHL Hip Adductor   Lower: R 5/5 5/5 5/5 5/5 5/5 5/5 5/5     L 5/5 5/5 5/5 5-/5 5/5 5/5 5/5         Reflexes:    Right Left   Triceps 2+ 2+   Biceps 2+ 2+   Brachioradialis 1+ 1+   Patellar 0 0   Achilles 0 0      Decreased sensation: left hand (C6, C7, C8), below waist diffusely L>R, left lower leg and foot     Wiseman: negative (both)     Gait:  Stiff, antalgic, and limping     Coordination:  Unable to walk on heels & toes d/t L>R weakness        Musculoskeletal:   Cervical ROM: Pain in extension. Increases swallowing difficulty  There is a small scratch involving the skin of upper back just left of midline.      Imaging:  All pertinent neuroimaging independently reviewed. Discussed these findings in detail with the patient.      ASSESSMENT:       1. Cervical spondylosis with radiculopathy  CBC Auto Differential    Comprehensive Metabolic Panel      2. Preoperative testing  CBC Auto Differential    Comprehensive Metabolic Panel      3. Spinal stenosis of lumbar region with neurogenic claudication  HYDROcodone-acetaminophen (NORCO)  mg per tablet    cyclobenzaprine (FLEXERIL) 10 MG " tablet      4. Spinal stenosis, cervical region  HYDROcodone-acetaminophen (NORCO)  mg per tablet    cyclobenzaprine (FLEXERIL) 10 MG tablet           PLAN:     Options were once again discussed with the patient.  Risks, benefits, and possible complications were discussed.  All the patient's questions were answered.  Plan is for C3 through C7 laminoplasty and removal of C2 through C7 anterior osteophytes.  This is tentatively scheduled for 09/14/2023.  Consents were signed at this time.      Marilee Gonzalez PA-C      Disclaimer:  This note is prepared using voice recognition software and as such is likely to have errors despite attempts at proofreading.

## 2023-09-07 RX ORDER — ZOLPIDEM TARTRATE 10 MG/1
5 TABLET ORAL NIGHTLY PRN
Status: ON HOLD | COMMUNITY
End: 2024-02-25 | Stop reason: HOSPADM

## 2023-09-07 RX ORDER — MELOXICAM 15 MG/1
15 TABLET ORAL DAILY
COMMUNITY

## 2023-09-07 RX ORDER — TRAZODONE HYDROCHLORIDE 50 MG/1
50 TABLET ORAL NIGHTLY
COMMUNITY

## 2023-09-11 ENCOUNTER — ANESTHESIA EVENT (OUTPATIENT)
Dept: SURGERY | Facility: HOSPITAL | Age: 63
DRG: 520 | End: 2023-09-11
Payer: COMMERCIAL

## 2023-09-13 ENCOUNTER — TELEPHONE (OUTPATIENT)
Dept: NEUROSURGERY | Facility: CLINIC | Age: 63
End: 2023-09-13
Payer: COMMERCIAL

## 2023-09-13 DIAGNOSIS — M47.22 CERVICAL SPONDYLOSIS WITH RADICULOPATHY: Primary | ICD-10-CM

## 2023-09-13 NOTE — PRE-PROCEDURE INSTRUCTIONS
[facility]: Outpatient Surgery  Preprocedure Check-In Instructions    Your arrival time for your surgery or procedure will be given to you by your surgeon's office.  We ask patients to arrive about 2 hours before surgery to allow for enough time to review your health history & medications, start your IV, complete any outstanding labwork or tests, and meet your Anesthesiologist.  You will arrive at [facility], [address] [place] [address].  Enter through the [place] entrance next to the Emergency Room, and come to the 6th floor to the Outpatient Surgery Department.    Visitory Policy:  You are allowed 2 adult visitors to be with you in the hospital.  All hospital visitors should be in good current health.  No small children.    What to Bring:  Please have your ID, insurance cards, and all home medication bottles with you at check in.  Bring your CPAP machine if one is used at home.    Fasting:  Nothing to eat or drink after midnight the night before your procedure. This includes no ice, gum, hard candies, and/or tobacco products.  Follow your doctor's instructions for taking any medications on the morning of your procedure.  If no instructions for taking medications were given, do not take any medications but bring your medications in their bottles to your procedure check in.    Follow your doctor's preoperative instructions regarding skin prep, bowel prep, bathing, or showering prior to your procedure.  If any special soaps were provided to you, please use according to your doctor's instructions. If no instructions were given from your doctor, take a good bath or shower with antibacterial soap the night before and the morning of your procedure.  On the morning of procedure, wear loose, comfortable clothing.  No lotions, makeup, perfumes, colognes, deodorant, or jewelry to your procedure.  Removable items (glasses, contact lenses, dentures, retainers, hearing aids) need to  be removed for your procedure.  Bring your storage containers for these items if you wear them.    Artificial nails, body jewelry, eyelash extensions, and/or hair extensions with metal clips are not allowed during your surgery.  If you currently wear any of these items, please arrange for them to be removed prior to your arrival to the hospital.    Outpatient or Same Day Surgeries:  Any patients receiving sedation/anesthesia are advised not to drive for 24 hours after their procedure.  We do not allow patients to drive themselves home after discharge.  If you are going home after your procedure, please have someone available to drive you home from the hospital.      You may call the Outpatient Surgery Department at [phone] with any questions or concerns.  We are looking forward to meeting you and taking great care of you for your procedure.  Thank you for choosing [facility] for your surgical needs.        Status: complete  Spoke with: patient  Call Time: [id number]

## 2023-09-14 ENCOUNTER — HOSPITAL ENCOUNTER (INPATIENT)
Facility: HOSPITAL | Age: 63
LOS: 2 days | Discharge: HOME | DRG: 520 | End: 2023-09-16
Attending: NEUROLOGICAL SURGERY | Admitting: NEUROLOGICAL SURGERY
Payer: COMMERCIAL

## 2023-09-14 ENCOUNTER — ANESTHESIA (OUTPATIENT)
Dept: SURGERY | Facility: HOSPITAL | Age: 63
DRG: 520 | End: 2023-09-14
Payer: COMMERCIAL

## 2023-09-14 DIAGNOSIS — M47.22 CERVICAL SPONDYLOSIS WITH RADICULOPATHY: ICD-10-CM

## 2023-09-14 DIAGNOSIS — M48.062 SPINAL STENOSIS OF LUMBAR REGION WITH NEUROGENIC CLAUDICATION: ICD-10-CM

## 2023-09-14 DIAGNOSIS — M48.02 SPINAL STENOSIS, CERVICAL REGION: ICD-10-CM

## 2023-09-14 DIAGNOSIS — M47.12 CERVICAL SPONDYLOSIS WITH MYELOPATHY AND RADICULOPATHY: Primary | ICD-10-CM

## 2023-09-14 DIAGNOSIS — M47.22 CERVICAL SPONDYLOSIS WITH MYELOPATHY AND RADICULOPATHY: Primary | ICD-10-CM

## 2023-09-14 DIAGNOSIS — R13.10 DYSPHAGIA, UNSPECIFIED TYPE: Chronic | ICD-10-CM

## 2023-09-14 LAB
GROUP & RH: NORMAL
INDIRECT COOMBS GEL: NORMAL
POCT GLUCOSE: 119 MG/DL (ref 70–110)
POCT GLUCOSE: 181 MG/DL (ref 70–110)
SPECIMEN OUTDATE: NORMAL

## 2023-09-14 PROCEDURE — 36000711: Performed by: NEUROLOGICAL SURGERY

## 2023-09-14 PROCEDURE — 27000221 HC OXYGEN, UP TO 24 HOURS

## 2023-09-14 PROCEDURE — D9220A PRA ANESTHESIA: Mod: ANES,,, | Performed by: STUDENT IN AN ORGANIZED HEALTH CARE EDUCATION/TRAINING PROGRAM

## 2023-09-14 PROCEDURE — 63050 CERVICAL LAMINOPLSTY 2/> SEG: CPT | Mod: ,,, | Performed by: NEUROLOGICAL SURGERY

## 2023-09-14 PROCEDURE — 22100 REMOVE PART OF NECK VERTEBRA: CPT | Mod: 59,,, | Performed by: NEUROLOGICAL SURGERY

## 2023-09-14 PROCEDURE — 25000003 PHARM REV CODE 250: Performed by: NURSE ANESTHETIST, CERTIFIED REGISTERED

## 2023-09-14 PROCEDURE — 36000710: Performed by: NEUROLOGICAL SURGERY

## 2023-09-14 PROCEDURE — 36620 ARTERIAL: ICD-10-PCS | Mod: 59,,, | Performed by: STUDENT IN AN ORGANIZED HEALTH CARE EDUCATION/TRAINING PROGRAM

## 2023-09-14 PROCEDURE — 37000009 HC ANESTHESIA EA ADD 15 MINS: Performed by: NEUROLOGICAL SURGERY

## 2023-09-14 PROCEDURE — 63050 PR C- LAMINOPLASTY, 2 OR MORE: ICD-10-PCS | Mod: 80,,, | Performed by: STUDENT IN AN ORGANIZED HEALTH CARE EDUCATION/TRAINING PROGRAM

## 2023-09-14 PROCEDURE — 63600175 PHARM REV CODE 636 W HCPCS: Performed by: NEUROLOGICAL SURGERY

## 2023-09-14 PROCEDURE — 11000001 HC ACUTE MED/SURG PRIVATE ROOM

## 2023-09-14 PROCEDURE — 36620 INSERTION CATHETER ARTERY: CPT | Mod: 59,,, | Performed by: STUDENT IN AN ORGANIZED HEALTH CARE EDUCATION/TRAINING PROGRAM

## 2023-09-14 PROCEDURE — 63600175 PHARM REV CODE 636 W HCPCS: Performed by: NURSE ANESTHETIST, CERTIFIED REGISTERED

## 2023-09-14 PROCEDURE — 63050 PR C- LAMINOPLASTY, 2 OR MORE: ICD-10-PCS | Mod: ,,, | Performed by: NEUROLOGICAL SURGERY

## 2023-09-14 PROCEDURE — 71000033 HC RECOVERY, INTIAL HOUR: Performed by: NEUROLOGICAL SURGERY

## 2023-09-14 PROCEDURE — C1713 ANCHOR/SCREW BN/BN,TIS/BN: HCPCS | Performed by: NEUROLOGICAL SURGERY

## 2023-09-14 PROCEDURE — D9220A PRA ANESTHESIA: Mod: CRNA,,, | Performed by: NURSE ANESTHETIST, CERTIFIED REGISTERED

## 2023-09-14 PROCEDURE — 22100 REMOVE PART OF NECK VERTEBRA: CPT | Mod: 80,59,, | Performed by: STUDENT IN AN ORGANIZED HEALTH CARE EDUCATION/TRAINING PROGRAM

## 2023-09-14 PROCEDURE — 63600175 PHARM REV CODE 636 W HCPCS: Performed by: ANESTHESIOLOGY

## 2023-09-14 PROCEDURE — D9220A PRA ANESTHESIA: ICD-10-PCS | Mod: ANES,,, | Performed by: STUDENT IN AN ORGANIZED HEALTH CARE EDUCATION/TRAINING PROGRAM

## 2023-09-14 PROCEDURE — 37000008 HC ANESTHESIA 1ST 15 MINUTES: Performed by: NEUROLOGICAL SURGERY

## 2023-09-14 PROCEDURE — 22103: ICD-10-PCS | Mod: ,,, | Performed by: NEUROLOGICAL SURGERY

## 2023-09-14 PROCEDURE — 71000039 HC RECOVERY, EACH ADD'L HOUR: Performed by: NEUROLOGICAL SURGERY

## 2023-09-14 PROCEDURE — D9220A PRA ANESTHESIA: ICD-10-PCS | Mod: CRNA,,, | Performed by: NURSE ANESTHETIST, CERTIFIED REGISTERED

## 2023-09-14 PROCEDURE — 86850 RBC ANTIBODY SCREEN: CPT | Performed by: NEUROLOGICAL SURGERY

## 2023-09-14 PROCEDURE — 22103: ICD-10-PCS | Mod: 80,,, | Performed by: STUDENT IN AN ORGANIZED HEALTH CARE EDUCATION/TRAINING PROGRAM

## 2023-09-14 PROCEDURE — 27201423 OPTIME MED/SURG SUP & DEVICES STERILE SUPPLY: Performed by: NEUROLOGICAL SURGERY

## 2023-09-14 PROCEDURE — C1729 CATH, DRAINAGE: HCPCS | Performed by: NEUROLOGICAL SURGERY

## 2023-09-14 PROCEDURE — 22100: ICD-10-PCS | Mod: 80,59,, | Performed by: STUDENT IN AN ORGANIZED HEALTH CARE EDUCATION/TRAINING PROGRAM

## 2023-09-14 PROCEDURE — 63600175 PHARM REV CODE 636 W HCPCS

## 2023-09-14 PROCEDURE — 82962 GLUCOSE BLOOD TEST: CPT | Performed by: NEUROLOGICAL SURGERY

## 2023-09-14 PROCEDURE — 25000003 PHARM REV CODE 250: Performed by: NEUROLOGICAL SURGERY

## 2023-09-14 PROCEDURE — 22103 REMOVE EXTRA SPINE SEGMENT: CPT | Mod: 80,,, | Performed by: STUDENT IN AN ORGANIZED HEALTH CARE EDUCATION/TRAINING PROGRAM

## 2023-09-14 PROCEDURE — 22100: ICD-10-PCS | Mod: 59,,, | Performed by: NEUROLOGICAL SURGERY

## 2023-09-14 PROCEDURE — 63600175 PHARM REV CODE 636 W HCPCS: Mod: JZ,JG | Performed by: NURSE ANESTHETIST, CERTIFIED REGISTERED

## 2023-09-14 PROCEDURE — 63050 CERVICAL LAMINOPLSTY 2/> SEG: CPT | Mod: 80,,, | Performed by: STUDENT IN AN ORGANIZED HEALTH CARE EDUCATION/TRAINING PROGRAM

## 2023-09-14 PROCEDURE — 22103 REMOVE EXTRA SPINE SEGMENT: CPT | Mod: ,,, | Performed by: NEUROLOGICAL SURGERY

## 2023-09-14 PROCEDURE — P9047 ALBUMIN (HUMAN), 25%, 50ML: HCPCS | Mod: JZ,JG | Performed by: NURSE ANESTHETIST, CERTIFIED REGISTERED

## 2023-09-14 PROCEDURE — 36620 INSERTION CATHETER ARTERY: CPT | Performed by: NURSE ANESTHETIST, CERTIFIED REGISTERED

## 2023-09-14 DEVICE — IMPLANTABLE DEVICE: Type: IMPLANTABLE DEVICE | Site: SPINE CERVICAL | Status: FUNCTIONAL

## 2023-09-14 DEVICE — KIT STIMULAN RAPID CURE 5CC: Type: IMPLANTABLE DEVICE | Site: SPINE CERVICAL | Status: FUNCTIONAL

## 2023-09-14 RX ORDER — PROPOFOL 10 MG/ML
VIAL (ML) INTRAVENOUS
Status: DISCONTINUED | OUTPATIENT
Start: 2023-09-14 | End: 2023-09-14

## 2023-09-14 RX ORDER — CEFAZOLIN SODIUM 1 G/3ML
1 INJECTION, POWDER, FOR SOLUTION INTRAMUSCULAR; INTRAVENOUS
Status: DISCONTINUED | OUTPATIENT
Start: 2023-09-14 | End: 2023-09-16 | Stop reason: HOSPADM

## 2023-09-14 RX ORDER — POTASSIUM CHLORIDE 750 MG/1
10 TABLET, EXTENDED RELEASE ORAL DAILY
Status: DISCONTINUED | OUTPATIENT
Start: 2023-09-15 | End: 2023-09-16 | Stop reason: HOSPADM

## 2023-09-14 RX ORDER — HYDROCODONE BITARTRATE AND ACETAMINOPHEN 10; 325 MG/1; MG/1
1 TABLET ORAL EVERY 4 HOURS PRN
Status: DISCONTINUED | OUTPATIENT
Start: 2023-09-14 | End: 2023-09-16 | Stop reason: HOSPADM

## 2023-09-14 RX ORDER — HYDROMORPHONE HYDROCHLORIDE 2 MG/ML
2 INJECTION, SOLUTION INTRAMUSCULAR; INTRAVENOUS; SUBCUTANEOUS
Status: DISCONTINUED | OUTPATIENT
Start: 2023-09-14 | End: 2023-09-16 | Stop reason: HOSPADM

## 2023-09-14 RX ORDER — ONDANSETRON 2 MG/ML
INJECTION INTRAMUSCULAR; INTRAVENOUS
Status: DISCONTINUED | OUTPATIENT
Start: 2023-09-14 | End: 2023-09-14

## 2023-09-14 RX ORDER — FENTANYL CITRATE 50 UG/ML
INJECTION, SOLUTION INTRAMUSCULAR; INTRAVENOUS
Status: DISCONTINUED | OUTPATIENT
Start: 2023-09-14 | End: 2023-09-14

## 2023-09-14 RX ORDER — KETAMINE HYDROCHLORIDE 100 MG/ML
INJECTION, SOLUTION INTRAMUSCULAR; INTRAVENOUS
Status: DISCONTINUED | OUTPATIENT
Start: 2023-09-14 | End: 2023-09-14

## 2023-09-14 RX ORDER — ATORVASTATIN CALCIUM 10 MG/1
20 TABLET, FILM COATED ORAL DAILY
Status: DISCONTINUED | OUTPATIENT
Start: 2023-09-15 | End: 2023-09-16 | Stop reason: HOSPADM

## 2023-09-14 RX ORDER — CEFAZOLIN SODIUM 2 G/50ML
2 SOLUTION INTRAVENOUS
Status: COMPLETED | OUTPATIENT
Start: 2023-09-14 | End: 2023-09-15

## 2023-09-14 RX ORDER — ACETAMINOPHEN 10 MG/ML
INJECTION, SOLUTION INTRAVENOUS
Status: DISCONTINUED | OUTPATIENT
Start: 2023-09-14 | End: 2023-09-14

## 2023-09-14 RX ORDER — LIDOCAINE HYDROCHLORIDE AND EPINEPHRINE 5; 5 MG/ML; UG/ML
INJECTION, SOLUTION INFILTRATION; PERINEURAL
Status: DISCONTINUED | OUTPATIENT
Start: 2023-09-14 | End: 2023-09-14 | Stop reason: HOSPADM

## 2023-09-14 RX ORDER — CYCLOBENZAPRINE HCL 10 MG
10 TABLET ORAL 3 TIMES DAILY PRN
Status: DISCONTINUED | OUTPATIENT
Start: 2023-09-14 | End: 2023-09-16 | Stop reason: HOSPADM

## 2023-09-14 RX ORDER — SODIUM CHLORIDE 0.9 % (FLUSH) 0.9 %
10 SYRINGE (ML) INJECTION
Status: DISCONTINUED | OUTPATIENT
Start: 2023-09-14 | End: 2023-09-14 | Stop reason: HOSPADM

## 2023-09-14 RX ORDER — HYDROCODONE BITARTRATE AND ACETAMINOPHEN 10; 325 MG/1; MG/1
2 TABLET ORAL EVERY 4 HOURS PRN
Status: DISCONTINUED | OUTPATIENT
Start: 2023-09-14 | End: 2023-09-16 | Stop reason: HOSPADM

## 2023-09-14 RX ORDER — GLYCOPYRROLATE 0.2 MG/ML
INJECTION INTRAMUSCULAR; INTRAVENOUS
Status: DISCONTINUED | OUTPATIENT
Start: 2023-09-14 | End: 2023-09-14

## 2023-09-14 RX ORDER — BACITRACIN 500 [USP'U]/G
OINTMENT TOPICAL
Status: DISCONTINUED | OUTPATIENT
Start: 2023-09-14 | End: 2023-09-14 | Stop reason: HOSPADM

## 2023-09-14 RX ORDER — ACETAMINOPHEN 325 MG/1
650 TABLET ORAL EVERY 6 HOURS PRN
Status: DISCONTINUED | OUTPATIENT
Start: 2023-09-14 | End: 2023-09-16 | Stop reason: HOSPADM

## 2023-09-14 RX ORDER — ZOLPIDEM TARTRATE 5 MG/1
5 TABLET ORAL NIGHTLY PRN
Status: DISCONTINUED | OUTPATIENT
Start: 2023-09-14 | End: 2023-09-16 | Stop reason: HOSPADM

## 2023-09-14 RX ORDER — HYDROMORPHONE HYDROCHLORIDE 2 MG/ML
INJECTION, SOLUTION INTRAMUSCULAR; INTRAVENOUS; SUBCUTANEOUS
Status: DISCONTINUED | OUTPATIENT
Start: 2023-09-14 | End: 2023-09-14

## 2023-09-14 RX ORDER — AMOXICILLIN 250 MG
2 CAPSULE ORAL NIGHTLY PRN
Status: DISCONTINUED | OUTPATIENT
Start: 2023-09-14 | End: 2023-09-16 | Stop reason: HOSPADM

## 2023-09-14 RX ORDER — SODIUM CHLORIDE 9 MG/ML
INJECTION, SOLUTION INTRAVENOUS CONTINUOUS
Status: DISCONTINUED | OUTPATIENT
Start: 2023-09-14 | End: 2023-09-16 | Stop reason: HOSPADM

## 2023-09-14 RX ORDER — DEXAMETHASONE SODIUM PHOSPHATE 4 MG/ML
INJECTION, SOLUTION INTRA-ARTICULAR; INTRALESIONAL; INTRAMUSCULAR; INTRAVENOUS; SOFT TISSUE
Status: DISCONTINUED | OUTPATIENT
Start: 2023-09-14 | End: 2023-09-14

## 2023-09-14 RX ORDER — MORPHINE SULFATE 10 MG/ML
2 INJECTION INTRAMUSCULAR; INTRAVENOUS; SUBCUTANEOUS
Status: DISCONTINUED | OUTPATIENT
Start: 2023-09-14 | End: 2023-09-16 | Stop reason: HOSPADM

## 2023-09-14 RX ORDER — INSULIN ASPART 100 [IU]/ML
0-5 INJECTION, SOLUTION INTRAVENOUS; SUBCUTANEOUS EVERY 6 HOURS PRN
Status: DISCONTINUED | OUTPATIENT
Start: 2023-09-14 | End: 2023-09-16 | Stop reason: HOSPADM

## 2023-09-14 RX ORDER — FUROSEMIDE 40 MG/1
40 TABLET ORAL 2 TIMES DAILY
Status: DISCONTINUED | OUTPATIENT
Start: 2023-09-14 | End: 2023-09-16 | Stop reason: HOSPADM

## 2023-09-14 RX ORDER — VANCOMYCIN HYDROCHLORIDE 500 MG/10ML
INJECTION, POWDER, LYOPHILIZED, FOR SOLUTION INTRAVENOUS
Status: DISCONTINUED | OUTPATIENT
Start: 2023-09-14 | End: 2023-09-14 | Stop reason: HOSPADM

## 2023-09-14 RX ORDER — HYDROCHLOROTHIAZIDE 25 MG/1
25 TABLET ORAL DAILY
Status: DISCONTINUED | OUTPATIENT
Start: 2023-09-15 | End: 2023-09-16 | Stop reason: HOSPADM

## 2023-09-14 RX ORDER — FLUOXETINE HYDROCHLORIDE 20 MG/1
20 CAPSULE ORAL DAILY
Status: DISCONTINUED | OUTPATIENT
Start: 2023-09-15 | End: 2023-09-16 | Stop reason: HOSPADM

## 2023-09-14 RX ORDER — LIDOCAINE HYDROCHLORIDE 20 MG/ML
INJECTION INTRAVENOUS
Status: DISCONTINUED | OUTPATIENT
Start: 2023-09-14 | End: 2023-09-14

## 2023-09-14 RX ORDER — CEFAZOLIN SODIUM 2 G/50ML
2 SOLUTION INTRAVENOUS
Status: COMPLETED | OUTPATIENT
Start: 2023-09-14 | End: 2023-09-14

## 2023-09-14 RX ORDER — ACETAMINOPHEN 325 MG/1
650 TABLET ORAL EVERY 4 HOURS PRN
Status: DISCONTINUED | OUTPATIENT
Start: 2023-09-14 | End: 2023-09-14

## 2023-09-14 RX ORDER — ROCURONIUM BROMIDE 10 MG/ML
INJECTION, SOLUTION INTRAVENOUS
Status: DISCONTINUED | OUTPATIENT
Start: 2023-09-14 | End: 2023-09-14

## 2023-09-14 RX ORDER — MAG HYDROX/ALUMINUM HYD/SIMETH 200-200-20
30 SUSPENSION, ORAL (FINAL DOSE FORM) ORAL EVERY 4 HOURS PRN
Status: DISCONTINUED | OUTPATIENT
Start: 2023-09-14 | End: 2023-09-16 | Stop reason: HOSPADM

## 2023-09-14 RX ORDER — METFORMIN HYDROCHLORIDE 500 MG/1
1000 TABLET ORAL 2 TIMES DAILY
Status: DISCONTINUED | OUTPATIENT
Start: 2023-09-14 | End: 2023-09-16 | Stop reason: HOSPADM

## 2023-09-14 RX ORDER — HYDROCODONE BITARTRATE AND ACETAMINOPHEN 10; 325 MG/1; MG/1
1 TABLET ORAL EVERY 4 HOURS PRN
Status: DISCONTINUED | OUTPATIENT
Start: 2023-09-14 | End: 2023-09-14

## 2023-09-14 RX ORDER — ONDANSETRON 4 MG/1
8 TABLET, ORALLY DISINTEGRATING ORAL EVERY 6 HOURS PRN
Status: DISCONTINUED | OUTPATIENT
Start: 2023-09-14 | End: 2023-09-16 | Stop reason: HOSPADM

## 2023-09-14 RX ORDER — CEFAZOLIN SODIUM IN 0.9 % NACL 3 G/100 ML
3 INTRAVENOUS SOLUTION, PIGGYBACK (ML) INTRAVENOUS
Status: DISCONTINUED | OUTPATIENT
Start: 2023-09-14 | End: 2023-09-14

## 2023-09-14 RX ORDER — PROCHLORPERAZINE EDISYLATE 5 MG/ML
5 INJECTION INTRAMUSCULAR; INTRAVENOUS EVERY 6 HOURS PRN
Status: DISCONTINUED | OUTPATIENT
Start: 2023-09-14 | End: 2023-09-16 | Stop reason: HOSPADM

## 2023-09-14 RX ORDER — GLUCAGON 1 MG
1 KIT INJECTION
Status: DISCONTINUED | OUTPATIENT
Start: 2023-09-14 | End: 2023-09-16 | Stop reason: HOSPADM

## 2023-09-14 RX ORDER — DEXMEDETOMIDINE HYDROCHLORIDE 100 UG/ML
INJECTION, SOLUTION INTRAVENOUS
Status: DISCONTINUED | OUTPATIENT
Start: 2023-09-14 | End: 2023-09-14

## 2023-09-14 RX ORDER — BISACODYL 10 MG
10 SUPPOSITORY, RECTAL RECTAL DAILY
Status: DISCONTINUED | OUTPATIENT
Start: 2023-09-15 | End: 2023-09-16 | Stop reason: HOSPADM

## 2023-09-14 RX ORDER — MIDAZOLAM HYDROCHLORIDE 1 MG/ML
INJECTION INTRAMUSCULAR; INTRAVENOUS
Status: DISCONTINUED | OUTPATIENT
Start: 2023-09-14 | End: 2023-09-14

## 2023-09-14 RX ORDER — AMLODIPINE BESYLATE 5 MG/1
10 TABLET ORAL DAILY
Status: DISCONTINUED | OUTPATIENT
Start: 2023-09-15 | End: 2023-09-16 | Stop reason: HOSPADM

## 2023-09-14 RX ORDER — LISINOPRIL 20 MG/1
20 TABLET ORAL DAILY
Status: DISCONTINUED | OUTPATIENT
Start: 2023-09-15 | End: 2023-09-16 | Stop reason: HOSPADM

## 2023-09-14 RX ORDER — LISINOPRIL AND HYDROCHLOROTHIAZIDE 20; 25 MG/1; MG/1
1 TABLET ORAL DAILY
Status: DISCONTINUED | OUTPATIENT
Start: 2023-09-15 | End: 2023-09-14

## 2023-09-14 RX ORDER — PANTOPRAZOLE SODIUM 40 MG/1
40 TABLET, DELAYED RELEASE ORAL DAILY
Status: DISCONTINUED | OUTPATIENT
Start: 2023-09-15 | End: 2023-09-16 | Stop reason: HOSPADM

## 2023-09-14 RX ORDER — SODIUM CITRATE AND CITRIC ACID MONOHYDRATE 334; 500 MG/5ML; MG/5ML
30 SOLUTION ORAL ONCE
Status: DISCONTINUED | OUTPATIENT
Start: 2023-09-14 | End: 2023-09-16 | Stop reason: HOSPADM

## 2023-09-14 RX ORDER — HYDROMORPHONE HYDROCHLORIDE 2 MG/ML
0.4 INJECTION, SOLUTION INTRAMUSCULAR; INTRAVENOUS; SUBCUTANEOUS EVERY 5 MIN PRN
Status: DISCONTINUED | OUTPATIENT
Start: 2023-09-14 | End: 2023-09-14 | Stop reason: HOSPADM

## 2023-09-14 RX ORDER — GLIMEPIRIDE 4 MG/1
4 TABLET ORAL DAILY
Status: DISCONTINUED | OUTPATIENT
Start: 2023-09-15 | End: 2023-09-16 | Stop reason: HOSPADM

## 2023-09-14 RX ORDER — CEFAZOLIN SODIUM 1 G/3ML
INJECTION, POWDER, FOR SOLUTION INTRAMUSCULAR; INTRAVENOUS
Status: DISCONTINUED | OUTPATIENT
Start: 2023-09-14 | End: 2023-09-14 | Stop reason: HOSPADM

## 2023-09-14 RX ORDER — TRAZODONE HYDROCHLORIDE 50 MG/1
50 TABLET ORAL NIGHTLY
Status: DISCONTINUED | OUTPATIENT
Start: 2023-09-14 | End: 2023-09-16 | Stop reason: HOSPADM

## 2023-09-14 RX ORDER — HYDROMORPHONE HYDROCHLORIDE 2 MG/ML
1 INJECTION, SOLUTION INTRAMUSCULAR; INTRAVENOUS; SUBCUTANEOUS
Status: DISCONTINUED | OUTPATIENT
Start: 2023-09-14 | End: 2023-09-16 | Stop reason: HOSPADM

## 2023-09-14 RX ORDER — SUCCINYLCHOLINE CHLORIDE 20 MG/ML
INJECTION INTRAMUSCULAR; INTRAVENOUS
Status: DISCONTINUED | OUTPATIENT
Start: 2023-09-14 | End: 2023-09-14

## 2023-09-14 RX ORDER — LEVOTHYROXINE SODIUM 100 UG/1
100 TABLET ORAL
Status: DISCONTINUED | OUTPATIENT
Start: 2023-09-15 | End: 2023-09-16 | Stop reason: HOSPADM

## 2023-09-14 RX ORDER — ALBUMIN HUMAN 250 G/1000ML
SOLUTION INTRAVENOUS CONTINUOUS PRN
Status: DISCONTINUED | OUTPATIENT
Start: 2023-09-14 | End: 2023-09-14

## 2023-09-14 RX ORDER — CHOLECALCIFEROL (VITAMIN D3) 25 MCG
1000 TABLET ORAL DAILY
Status: DISCONTINUED | OUTPATIENT
Start: 2023-09-15 | End: 2023-09-16 | Stop reason: HOSPADM

## 2023-09-14 RX ORDER — PHENYLEPHRINE HYDROCHLORIDE 10 MG/ML
INJECTION INTRAVENOUS
Status: DISCONTINUED | OUTPATIENT
Start: 2023-09-14 | End: 2023-09-14

## 2023-09-14 RX ADMIN — DEXMEDETOMIDINE 4 MCG: 200 INJECTION, SOLUTION INTRAVENOUS at 01:09

## 2023-09-14 RX ADMIN — PROPOFOL 70 MG: 10 INJECTION, EMULSION INTRAVENOUS at 10:09

## 2023-09-14 RX ADMIN — PHENYLEPHRINE HYDROCHLORIDE 20 MCG/MIN: 10 INJECTION INTRAVENOUS at 10:09

## 2023-09-14 RX ADMIN — CEFAZOLIN SODIUM 3 G: 2 SOLUTION INTRAVENOUS at 10:09

## 2023-09-14 RX ADMIN — CEFAZOLIN SODIUM 2 G: 2 SOLUTION INTRAVENOUS at 02:09

## 2023-09-14 RX ADMIN — ROCURONIUM BROMIDE 5 MG: 10 SOLUTION INTRAVENOUS at 09:09

## 2023-09-14 RX ADMIN — PROPOFOL 150 MG: 10 INJECTION, EMULSION INTRAVENOUS at 09:09

## 2023-09-14 RX ADMIN — SODIUM CHLORIDE, SODIUM GLUCONATE, SODIUM ACETATE, POTASSIUM CHLORIDE AND MAGNESIUM CHLORIDE: 526; 502; 368; 37; 30 INJECTION, SOLUTION INTRAVENOUS at 09:09

## 2023-09-14 RX ADMIN — HYDROMORPHONE HYDROCHLORIDE 0.5 MG: 2 INJECTION, SOLUTION INTRAMUSCULAR; INTRAVENOUS; SUBCUTANEOUS at 04:09

## 2023-09-14 RX ADMIN — ONDANSETRON 4 MG: 2 INJECTION INTRAMUSCULAR; INTRAVENOUS at 05:09

## 2023-09-14 RX ADMIN — HYDROMORPHONE HYDROCHLORIDE 0.4 MG: 2 INJECTION, SOLUTION INTRAMUSCULAR; INTRAVENOUS; SUBCUTANEOUS at 06:09

## 2023-09-14 RX ADMIN — MIDAZOLAM HYDROCHLORIDE 2 MG: 1 INJECTION, SOLUTION INTRAMUSCULAR; INTRAVENOUS at 09:09

## 2023-09-14 RX ADMIN — ACETAMINOPHEN 1000 MG: 10 INJECTION, SOLUTION INTRAVENOUS at 10:09

## 2023-09-14 RX ADMIN — HYDROMORPHONE HYDROCHLORIDE 1 MG: 2 INJECTION, SOLUTION INTRAMUSCULAR; INTRAVENOUS; SUBCUTANEOUS at 10:09

## 2023-09-14 RX ADMIN — DEXMEDETOMIDINE 4 MCG: 200 INJECTION, SOLUTION INTRAVENOUS at 05:09

## 2023-09-14 RX ADMIN — SUCCINYLCHOLINE CHLORIDE 160 MG: 20 INJECTION, SOLUTION INTRAMUSCULAR; INTRAVENOUS at 09:09

## 2023-09-14 RX ADMIN — DEXAMETHASONE SODIUM PHOSPHATE 8 MG: 4 INJECTION, SOLUTION INTRA-ARTICULAR; INTRALESIONAL; INTRAMUSCULAR; INTRAVENOUS; SOFT TISSUE at 05:09

## 2023-09-14 RX ADMIN — HYDROMORPHONE HYDROCHLORIDE 0.5 MG: 2 INJECTION, SOLUTION INTRAMUSCULAR; INTRAVENOUS; SUBCUTANEOUS at 01:09

## 2023-09-14 RX ADMIN — DEXMEDETOMIDINE 4 MCG: 200 INJECTION, SOLUTION INTRAVENOUS at 12:09

## 2023-09-14 RX ADMIN — ROCURONIUM BROMIDE 30 MG: 10 SOLUTION INTRAVENOUS at 10:09

## 2023-09-14 RX ADMIN — LIDOCAINE HYDROCHLORIDE 80 MG: 20 INJECTION INTRAVENOUS at 09:09

## 2023-09-14 RX ADMIN — DEXMEDETOMIDINE 4 MCG: 200 INJECTION, SOLUTION INTRAVENOUS at 10:09

## 2023-09-14 RX ADMIN — KETAMINE HYDROCHLORIDE 10 MG: 100 INJECTION, SOLUTION, CONCENTRATE INTRAMUSCULAR; INTRAVENOUS at 10:09

## 2023-09-14 RX ADMIN — DEXMEDETOMIDINE 4 MCG: 200 INJECTION, SOLUTION INTRAVENOUS at 02:09

## 2023-09-14 RX ADMIN — ALBUMIN (HUMAN): 12.5 SOLUTION INTRAVENOUS at 11:09

## 2023-09-14 RX ADMIN — HYDROMORPHONE HYDROCHLORIDE 0.5 MG: 2 INJECTION, SOLUTION INTRAMUSCULAR; INTRAVENOUS; SUBCUTANEOUS at 05:09

## 2023-09-14 RX ADMIN — KETAMINE HYDROCHLORIDE 10 MG: 100 INJECTION, SOLUTION, CONCENTRATE INTRAMUSCULAR; INTRAVENOUS at 02:09

## 2023-09-14 RX ADMIN — FENTANYL CITRATE 50 MCG: 50 INJECTION, SOLUTION INTRAMUSCULAR; INTRAVENOUS at 12:09

## 2023-09-14 RX ADMIN — SODIUM CHLORIDE: 9 INJECTION, SOLUTION INTRAVENOUS at 08:09

## 2023-09-14 RX ADMIN — DEXAMETHASONE SODIUM PHOSPHATE 8 MG: 4 INJECTION, SOLUTION INTRA-ARTICULAR; INTRALESIONAL; INTRAMUSCULAR; INTRAVENOUS; SOFT TISSUE at 10:09

## 2023-09-14 RX ADMIN — KETAMINE HYDROCHLORIDE 10 MG: 100 INJECTION, SOLUTION, CONCENTRATE INTRAMUSCULAR; INTRAVENOUS at 01:09

## 2023-09-14 RX ADMIN — FUROSEMIDE 40 MG: 40 TABLET ORAL at 08:09

## 2023-09-14 RX ADMIN — KETAMINE HYDROCHLORIDE 10 MG: 100 INJECTION, SOLUTION, CONCENTRATE INTRAMUSCULAR; INTRAVENOUS at 03:09

## 2023-09-14 RX ADMIN — FENTANYL CITRATE 50 MCG: 50 INJECTION, SOLUTION INTRAMUSCULAR; INTRAVENOUS at 09:09

## 2023-09-14 RX ADMIN — PHENYLEPHRINE HYDROCHLORIDE 50 MCG: 10 INJECTION INTRAVENOUS at 11:09

## 2023-09-14 RX ADMIN — DEXMEDETOMIDINE 4 MCG: 200 INJECTION, SOLUTION INTRAVENOUS at 04:09

## 2023-09-14 RX ADMIN — CEFAZOLIN SODIUM 2 G: 2 SOLUTION INTRAVENOUS at 08:09

## 2023-09-14 RX ADMIN — PHENYLEPHRINE HYDROCHLORIDE 50 MCG: 10 INJECTION INTRAVENOUS at 10:09

## 2023-09-14 RX ADMIN — METFORMIN HYDROCHLORIDE 1000 MG: 500 TABLET, FILM COATED ORAL at 08:09

## 2023-09-14 RX ADMIN — TRAZODONE HYDROCHLORIDE 50 MG: 50 TABLET ORAL at 08:09

## 2023-09-14 RX ADMIN — DEXMEDETOMIDINE 4 MCG: 200 INJECTION, SOLUTION INTRAVENOUS at 11:09

## 2023-09-14 RX ADMIN — GLYCOPYRROLATE 0.2 MG: 0.2 INJECTION INTRAMUSCULAR; INTRAVENOUS at 09:09

## 2023-09-14 RX ADMIN — KETAMINE HYDROCHLORIDE 10 MG: 100 INJECTION, SOLUTION, CONCENTRATE INTRAMUSCULAR; INTRAVENOUS at 12:09

## 2023-09-14 RX ADMIN — ALBUMIN (HUMAN): 12.5 SOLUTION INTRAVENOUS at 10:09

## 2023-09-14 NOTE — ANESTHESIA PROCEDURE NOTES
Intubation    Date/Time: 9/14/2023 9:58 AM    Performed by: Layla Portillo  Authorized by: Eliazar Cervantes MD    Intubation:     Induction:  Rapid sequence induction    Intubated:  Postinduction    Mask Ventilation:  N/a    Attempts:  1    Attempted By:  Student    Method of Intubation:  Video laryngoscopy    Blade:  Whiteside 4    Laryngeal View Grade: Grade I - full view of cords      Difficult Airway Encountered?: No      Complications:  None    Airway Device:  Oral endotracheal tube    Airway Device Size:  7.5    Style/Cuff Inflation:  Cuffed    Inflation Amount (mL):  7    Tube secured:  23    Secured at:  The lips    Placement Verified By:  Capnometry    Complicating Factors:  None    Findings Post-Intubation:  BS equal bilateral and atraumatic/condition of teeth unchanged

## 2023-09-14 NOTE — INTERVAL H&P NOTE
I have seen the patient and reviewed this note, and there is no change in history and physical since the last exam.    We discussed the pros and cons of doing both of his surgeries under the same aesthetic. Considering the fact that the anterior surgery is only in the prevertebral space rather than multilevel intradiscal surgery, which would take several more hours, we decided still to go ahead with both surgeries today, starting wi th the laminoplasty.

## 2023-09-14 NOTE — ANESTHESIA PREPROCEDURE EVALUATION
09/14/2023  Eren Page is a 63 y.o., male.    Other Medical History   Wears dentures Diabetes mellitus   Dysphagia Hypertension   Hypothyroidism Sleep apnea   Palpitations SOB (shortness of breath)   History of chest pain Claudication   Dizziness Lumbar radiculopathy   Cervical radiculopathy Depression   Dyslipidemia Insomnia   Right hip pain Right leg pain   Right leg weakness Irregular heart beat   Spinal stenosis of lumbar region, unspecified whether neurogenic claudication present History of kidney stones   Arthritis GERD (gastroesophageal reflux disease)   Headache Neck pain   Obesity Shoulder pain   Balance problems      Surgical History  TOTAL KNEE ARTHROPLASTY APPENDECTOMY   TONSILLECTOMY VASECTOMY   CHOLECYSTOTOMY BACK SURGERY   COLONOSCOPY DECOMPRESSION OF LUMBAR SPINE USING MINIMALLY INVASIVE TECHNIQUE     Pt was told to take his ozempic yesterday, which he did.  Will plan for RSI/GETA.  Appropriately NPO, no GERD symptoms.  Hx of hypothyroidism, htn, TRISTEN (+cpap), obesity, NIDDM, OA.  Pt with multiple prior back surgeries, most recently as this summer, denies anesthesia complications.  Pt also with balance issues and difficulty swallowing, with plans for C3-C7 laminoplasty and removal of C2 through C7 anterior osteophytes.  Arterial line, multiple PIVs, and possible post-operative ventilation discussed with the patient given prolonged prone case duration.  14 / 40 / 269k.  Na 140, K 4.2, Cr 0.85.  Type and screen active.  POCT glucose 119.              Pre-op Assessment    I have reviewed the Patient Summary Reports.     I have reviewed the Nursing Notes. I have reviewed the NPO Status.   I have reviewed the Medications.     Review of Systems  Anesthesia Hx:   Denies Personal Hx of Anesthesia complications.   Cardiovascular:   Hypertension    Pulmonary:   Sleep Apnea, CPAP    Hepatic/GI:   GERD     Neurological:   Neuromuscular Disease, Headaches    Endocrine:   Diabetes Hypothyroidism  Obesity / BMI > 30  Psych:   Psychiatric History          Physical Exam  General: Well nourished, Cooperative and Alert    Airway:  Mallampati: II   Mouth Opening: Normal  TM Distance: Normal  Tongue: Normal    Dental:  Dentures    Chest/Lungs:  Normal Respiratory Rate    Heart:  Rate: Normal        Anesthesia Plan  Type of Anesthesia, risks & benefits discussed:    Anesthesia Type: Gen ETT  Intra-op Monitoring Plan: Standard ASA Monitors and Art Line  Post Op Pain Control Plan: multimodal analgesia  Induction:  IV  Informed Consent: Informed consent signed with the Patient and all parties understand the risks and agree with anesthesia plan.  All questions answered. Patient consented to blood products? Yes  ASA Score: 3  Day of Surgery Review of History & Physical: H&P Update referred to the surgeon/provider.    Ready For Surgery From Anesthesia Perspective.     .

## 2023-09-14 NOTE — TRANSFER OF CARE
"Anesthesia Transfer of Care Note    Patient: Eren Page    Procedure(s) Performed: Procedure(s) (LRB):  LAMINOPLASTY (N/A)  FORAMINOTOMY, SPINE (N/A)    Patient location: PACU    Anesthesia Type: general    Transport from OR: Transported from OR intubated on 100% O2 by AMBU with assisted ventilation    Post pain: adequate analgesia    Post assessment: no apparent anesthetic complications    Post vital signs: stable    Level of consciousness: responds to stimulation    Nausea/Vomiting: no nausea/vomiting    Complications: none    Transfer of care protocol was followed      Last vitals:   Visit Vitals  BP (!) 153/76 (BP Location: Left arm, Patient Position: Lying)   Pulse 86   Temp 36.8 °C (98.2 °F) (Oral)   Resp 16   Ht 6' 1" (1.854 m)   Wt 129.6 kg (285 lb 11.5 oz)   SpO2 95%   BMI 37.70 kg/m²     "

## 2023-09-14 NOTE — BRIEF OP NOTE
[facility] - Neurosurgery  Brief Operative Note     Surgery Date: 9/14/23     Surgeon(s) and Role:     * Renny Barclay MD - Primary   Assistant: Jared Aguilar MD     Pre-op Diagnosis:   1. Cervical spondylosis and stenosis with myeloradiculopathy  2.  Anterior cervical osteophytosis and dysphagia     Post-op Diagnosis: Same     Procedure(s):   1. Anterior cervical exposure and removal severe ventral osteophytes (COMPLEX)  2. C3-C7 laminoplasty (Globus Canopy System)    Anesthesia: GET     Operative Findings: Patient tolerated procedure well and was transferred to 1. Laminoplasty carried out without incident  2. Anterior cervical exposure and removal ventral osteophytes  3. Microscope.     Estimated Blood Loss: 200cc         Specimens: None

## 2023-09-14 NOTE — ANESTHESIA PROCEDURE NOTES
Arterial    Diagnosis: Unstable cervical fx    Patient location during procedure: holding area  Procedure start time: 9/14/2023 9:51 AM  Timeout: 9/14/2023 9:51 AM  Procedure end time: 9/14/2023 9:51 AM    Staffing  Authorizing Provider: Eliazar Cervantes MD  Performing Provider: Rufus Portillo CRNA    Staffing  Performed by: Rufus Portillo CRNA  Authorized by: Eliazar Cervantes MD    Anesthesiologist was present at the time of the procedure.    Preanesthetic Checklist  Completed: patient identified, IV checked, site marked, risks and benefits discussed, surgical consent, monitors and equipment checked, pre-op evaluation, timeout performed and anesthesia consent givenArterial  Skin Prep: chlorhexidine gluconate  Local Infiltration: lidocaine  Orientation: right  Location: radial    Catheter Size: 20 G  Catheter placement by Anatomical landmarks. Heme positive aspiration all ports. Insertion Attempts: 1  Assessment  Dressing: secured with tape and tegaderm  Patient: Tolerated well

## 2023-09-15 LAB
ANION GAP SERPL CALC-SCNC: 10 MEQ/L
BASOPHILS # BLD AUTO: 0.01 X10(3)/MCL
BASOPHILS NFR BLD AUTO: 0.1 %
BUN SERPL-MCNC: 13.7 MG/DL (ref 8.4–25.7)
CALCIUM SERPL-MCNC: 8.6 MG/DL (ref 8.8–10)
CHLORIDE SERPL-SCNC: 101 MMOL/L (ref 98–107)
CO2 SERPL-SCNC: 25 MMOL/L (ref 23–31)
CREAT SERPL-MCNC: 0.8 MG/DL (ref 0.73–1.18)
CREAT/UREA NIT SERPL: 17
EOSINOPHIL # BLD AUTO: 0 X10(3)/MCL (ref 0–0.9)
EOSINOPHIL NFR BLD AUTO: 0 %
ERYTHROCYTE [DISTWIDTH] IN BLOOD BY AUTOMATED COUNT: 13.4 % (ref 11.5–17)
GFR SERPLBLD CREATININE-BSD FMLA CKD-EPI: >60 MLS/MIN/1.73/M2
GLUCOSE SERPL-MCNC: 173 MG/DL (ref 82–115)
HCT VFR BLD AUTO: 36.3 % (ref 42–52)
HGB BLD-MCNC: 12.5 G/DL (ref 14–18)
IMM GRANULOCYTES # BLD AUTO: 0.05 X10(3)/MCL (ref 0–0.04)
IMM GRANULOCYTES NFR BLD AUTO: 0.4 %
LYMPHOCYTES # BLD AUTO: 1.45 X10(3)/MCL (ref 0.6–4.6)
LYMPHOCYTES NFR BLD AUTO: 12 %
MCH RBC QN AUTO: 30.8 PG (ref 27–31)
MCHC RBC AUTO-ENTMCNC: 34.4 G/DL (ref 33–36)
MCV RBC AUTO: 89.4 FL (ref 80–94)
MONOCYTES # BLD AUTO: 0.64 X10(3)/MCL (ref 0.1–1.3)
MONOCYTES NFR BLD AUTO: 5.3 %
NEUTROPHILS # BLD AUTO: 9.91 X10(3)/MCL (ref 2.1–9.2)
NEUTROPHILS NFR BLD AUTO: 82.2 %
NRBC BLD AUTO-RTO: 0 %
PLATELET # BLD AUTO: 201 X10(3)/MCL (ref 130–400)
PMV BLD AUTO: 9.1 FL (ref 7.4–10.4)
POCT GLUCOSE: 152 MG/DL (ref 70–110)
POCT GLUCOSE: 176 MG/DL (ref 70–110)
POCT GLUCOSE: 184 MG/DL (ref 70–110)
POTASSIUM SERPL-SCNC: 5 MMOL/L (ref 3.5–5.1)
RBC # BLD AUTO: 4.06 X10(6)/MCL (ref 4.7–6.1)
SODIUM SERPL-SCNC: 136 MMOL/L (ref 136–145)
WBC # SPEC AUTO: 12.06 X10(3)/MCL (ref 4.5–11.5)

## 2023-09-15 PROCEDURE — 99024 PR POST-OP FOLLOW-UP VISIT: ICD-10-PCS | Mod: ,,, | Performed by: NEUROLOGICAL SURGERY

## 2023-09-15 PROCEDURE — 80048 BASIC METABOLIC PNL TOTAL CA: CPT | Performed by: NEUROLOGICAL SURGERY

## 2023-09-15 PROCEDURE — 25000003 PHARM REV CODE 250: Performed by: NEUROLOGICAL SURGERY

## 2023-09-15 PROCEDURE — 99024 POSTOP FOLLOW-UP VISIT: CPT | Mod: ,,, | Performed by: NEUROLOGICAL SURGERY

## 2023-09-15 PROCEDURE — 97166 OT EVAL MOD COMPLEX 45 MIN: CPT

## 2023-09-15 PROCEDURE — 63600175 PHARM REV CODE 636 W HCPCS: Performed by: NEUROLOGICAL SURGERY

## 2023-09-15 PROCEDURE — 11000001 HC ACUTE MED/SURG PRIVATE ROOM

## 2023-09-15 PROCEDURE — 97535 SELF CARE MNGMENT TRAINING: CPT

## 2023-09-15 PROCEDURE — 97162 PT EVAL MOD COMPLEX 30 MIN: CPT

## 2023-09-15 PROCEDURE — 85025 COMPLETE CBC W/AUTO DIFF WBC: CPT | Performed by: NEUROLOGICAL SURGERY

## 2023-09-15 RX ORDER — HYDROCODONE BITARTRATE AND ACETAMINOPHEN 10; 325 MG/1; MG/1
1 TABLET ORAL EVERY 4 HOURS PRN
Qty: 42 TABLET | Refills: 0 | Status: SHIPPED | OUTPATIENT
Start: 2023-09-15 | End: 2023-09-27 | Stop reason: SDUPTHER

## 2023-09-15 RX ADMIN — GLIMEPIRIDE 4 MG: 4 TABLET ORAL at 09:09

## 2023-09-15 RX ADMIN — Medication 1000 UNITS: at 09:09

## 2023-09-15 RX ADMIN — CEFAZOLIN SODIUM 2 G: 2 SOLUTION INTRAVENOUS at 12:09

## 2023-09-15 RX ADMIN — CYCLOBENZAPRINE 10 MG: 10 TABLET, FILM COATED ORAL at 10:09

## 2023-09-15 RX ADMIN — HYDROCODONE BITARTRATE AND ACETAMINOPHEN 2 TABLET: 10; 325 TABLET ORAL at 10:09

## 2023-09-15 RX ADMIN — AMLODIPINE BESYLATE 10 MG: 5 TABLET ORAL at 09:09

## 2023-09-15 RX ADMIN — HYDROMORPHONE HYDROCHLORIDE 1 MG: 2 INJECTION INTRAMUSCULAR; INTRAVENOUS; SUBCUTANEOUS at 11:09

## 2023-09-15 RX ADMIN — FUROSEMIDE 40 MG: 40 TABLET ORAL at 09:09

## 2023-09-15 RX ADMIN — POTASSIUM CHLORIDE 10 MEQ: 750 TABLET, FILM COATED, EXTENDED RELEASE ORAL at 09:09

## 2023-09-15 RX ADMIN — TRAZODONE HYDROCHLORIDE 50 MG: 50 TABLET ORAL at 10:09

## 2023-09-15 RX ADMIN — FLUOXETINE 20 MG: 20 CAPSULE ORAL at 09:09

## 2023-09-15 RX ADMIN — ZOLPIDEM TARTRATE 5 MG: 5 TABLET ORAL at 10:09

## 2023-09-15 RX ADMIN — METFORMIN HYDROCHLORIDE 1000 MG: 500 TABLET, FILM COATED ORAL at 10:09

## 2023-09-15 RX ADMIN — CEFAZOLIN SODIUM 2 G: 2 SOLUTION INTRAVENOUS at 09:09

## 2023-09-15 RX ADMIN — HYDROCODONE BITARTRATE AND ACETAMINOPHEN 2 TABLET: 10; 325 TABLET ORAL at 06:09

## 2023-09-15 RX ADMIN — LISINOPRIL 20 MG: 20 TABLET ORAL at 09:09

## 2023-09-15 RX ADMIN — LEVOTHYROXINE SODIUM 100 MCG: 100 TABLET ORAL at 05:09

## 2023-09-15 RX ADMIN — METFORMIN HYDROCHLORIDE 1000 MG: 500 TABLET, FILM COATED ORAL at 09:09

## 2023-09-15 RX ADMIN — PANTOPRAZOLE SODIUM 40 MG: 40 TABLET, DELAYED RELEASE ORAL at 09:09

## 2023-09-15 RX ADMIN — HYDROCHLOROTHIAZIDE 25 MG: 25 TABLET ORAL at 09:09

## 2023-09-15 RX ADMIN — ATORVASTATIN CALCIUM 20 MG: 10 TABLET, FILM COATED ORAL at 09:09

## 2023-09-15 RX ADMIN — FUROSEMIDE 40 MG: 40 TABLET ORAL at 10:09

## 2023-09-15 RX ADMIN — HYDROMORPHONE HYDROCHLORIDE 1 MG: 2 INJECTION INTRAMUSCULAR; INTRAVENOUS; SUBCUTANEOUS at 12:09

## 2023-09-15 RX ADMIN — HYDROCODONE BITARTRATE AND ACETAMINOPHEN 1 TABLET: 10; 325 TABLET ORAL at 09:09

## 2023-09-15 RX ADMIN — HYDROCODONE BITARTRATE AND ACETAMINOPHEN 2 TABLET: 10; 325 TABLET ORAL at 02:09

## 2023-09-15 NOTE — PT/OT/SLP EVAL
Physical Therapy Evaluation    Patient Name:  Eren Page   MRN:  [id number]    Recommendations:     Discharge Recommendations: home   Discharge Equipment Recommendations: none   Barriers to discharge: Impaired mobility and Ongoing medical needs    Assessment:     Eren Page is a 63 y.o. male admitted with a medical diagnosis of Cervical spondylosis with myelopathy and radiculopathy, s/p anterior cervical exposure and removal severe ventral osteophytes; posterior C3-C7 laminoplasties.  He presents with the following impairments/functional limitations: gait instability, pain, impaired functional mobility. Pt performed sit to stand t/f CGA with use of RW and ambulated about 165ft CGA with use of RW. Educated patient on spinal precautions. Will continue to progress patient as tolerated to increase functional independence.      Rehab Prognosis: Good; patient would benefit from acute skilled PT services to address these deficits and reach maximum level of function.    Recent Surgery: Procedure(s) (LRB):  LAMINOPLASTY (N/A)  FORAMINOTOMY, SPINE (N/A) 1 Day Post-Op    Plan:     During this hospitalization, patient to be seen 5 x/week to address the identified rehab impairments via gait training, therapeutic activities, therapeutic exercises and progress toward the following goals:    Plan of Care Expires:  10/15/23    Subjective     Chief Complaint: neck px  Patient/Family Comments/goals: independence  Pain/Comfort:  Pain Rating 1: 9/10  Location 1: cervical spine    Patients cultural, spiritual, Muslim conflicts given the current situation: no    Living Environment:  Pt lives with his wife in a single story home with 1 threshold step upon entry.   Prior to admission, patients level of function was Independent.  Equipment used/owned at home: cane, straight, crutches, walker, rolling, shower chair, wheelchair.  Upon discharge, patient will have assistance from wife.    Objective:     Communicated with NSG prior to  session.  Patient found up in chair with CHUCKIE drain, peripheral IV, beth catheter  upon PT entry to room.    General Precautions: Standard, fall  Orthopedic Precautions:spinal precautions   Braces: Cervical collar  Respiratory Status: Room air      Exams:  BLE ROM: WNL  BLE Strength: WNL      Functional Mobility:  Transfers:     Sit <> Stand:  contact guard assistance with rolling walker; increased time lowering onto chair  Gait: Pt amb about 165ft CGA with use of RW demo decreased john, step to gait pattern, and upright posture. No LOB noted. Chair in tow    Treatment & Education:      Patient provided with verbal education education regarding POC, mobility, and precautions.  Understanding was verbalized, however additional teaching warranted.     Patient left up in chair with all lines intact and call button in reach.    GOALS:   Multidisciplinary Problems       Physical Therapy Goals          Problem: Physical Therapy    Goal Priority Disciplines Outcome Goal Variances Interventions   Physical Therapy Goal     PT, PT/OT Ongoing, Progressing     Description: Goals to be met by: 10/15/2023     Patient will increase functional independence with mobility by performin. Supine to sit with [place]  2. Sit to supine with [place]  3. Sit to stand transfer with [place]  4. Gait  x 300 feet with [place] using Rolling Walker vs LRAD.                          History:     Past Medical History:   Diagnosis Date    Arthritis     Back pain     Balance problems     Cervical radiculopathy     Claudication     Depression     Diabetes mellitus     Dizziness     Dorsalgia     Dyslipidemia     Dysphagia 2022    GERD (gastroesophageal reflux disease)     Headache     History of chest pain     History of kidney stones     Hypertension     Hypothyroidism     Insomnia     Irregular heart beat     Lumbar radiculopathy     Neck pain     Obesity     Palpitations     Right hip pain     Right leg pain     Right  leg weakness     Shoulder pain     Sleep apnea     uses CPAP    SOB (shortness of breath)     Spinal stenosis of lumbar region, unspecified whether neurogenic claudication present     Wears dentures     FULL       Past Surgical History:   Procedure Laterality Date    APPENDECTOMY      BACK SURGERY  2000    L4-5 and L5-S1 ALIF with pedicle screws.    CHOLECYSTOTOMY      COLONOSCOPY      DECOMPRESSION OF LUMBAR SPINE USING MINIMALLY INVASIVE TECHNIQUE Bilateral 06/13/2023    L2-3, L3-4 decompression.  Dr. Barclay    FORAMINOTOMY N/A 9/14/2023    Procedure: FORAMINOTOMY, SPINE;  Surgeon: Renny Barclay MD;  Location: [facility];  Service: Neurosurgery;  Laterality: N/A;  C3-7 laminoplasty, then anterior removal of C2-7 osteophytes (posterior then flip and remove anterior osteophytes)    LAMINOPLASTY N/A 9/14/2023    Procedure: LAMINOPLASTY;  Surgeon: Renny Barclay MD;  Location: [facility];  Service: Neurosurgery;  Laterality: N/A;  C3-7 laminoplasty, then anterior removal of C2-7 osteophytes (posterior then flip and remove anterior osteophytes)  NTI  Owen w/ Adriana  //  XX    TONSILLECTOMY      TOTAL KNEE ARTHROPLASTY Bilateral     VASECTOMY         Time Tracking:     PT Received On: 09/15/23  PT Start Time: 1035     PT Stop Time: 1056  PT Total Time (min): 21 min     Billable Minutes: Evaluation mod      09/15/2023

## 2023-09-15 NOTE — PLAN OF CARE
Problem: Occupational Therapy  Goal: Occupational Therapy Goal  Description: Goals to be met by: 10/13/23     Patient will increase functional independence with ADLs by performing:    UE Dressing with [place].  LE Dressing with Modified [place] and AD as needed.  Grooming while standing with Modified [place].  Toileting from toilet with Modified [place] for hygiene and clothing management.   Toilet transfer to toilet with Modified [place].    Outcome: Ongoing, Progressing

## 2023-09-15 NOTE — PROGRESS NOTES
POD#1 anterior cervical exposure and removal severe ventral osteophytes; posterior C3-C7 laminoplasties  He is sitting up in bed, mild distress d/t anterior incisional pain  He c/o burning along the drain track  His anterior incision is very sensitive to light touch  He c/o posterior trap soreness  He is tolerating diet but having some difficulty swallowing his meds  He has not yet worked with PT    AFVSS  PERRL, EOMI  5/5 motor in bilateral UE  5/5 right LE; mild weakness in left DF/PF, -5/5, chronic  Chronic numbness in left LE from previous surgery  Incisions c/d/I  CHUCKIE output 10/15 anterior, 20/30 posterior    Plan: We will continue CHUCKIE drains for now  Continue current dressing to posterior incision; reinforce prn  OK to leave anterior incision open to air  PT/OT for OOB  Soft collar for OOB  SCDs for DVT prophylaxis

## 2023-09-15 NOTE — ANESTHESIA POSTPROCEDURE EVALUATION
Anesthesia Post Evaluation    Patient: Eren Page    Procedure(s) Performed: Procedure(s) (LRB):  LAMINOPLASTY (N/A)  FORAMINOTOMY, SPINE (N/A)    Final Anesthesia Type: general      Patient location during evaluation: PACU  Patient participation: Yes- Able to Participate  Level of consciousness: awake and alert  Post-procedure vital signs: reviewed and stable  Pain management: adequate  Airway patency: patent  TRISTEN mitigation strategies: Multimodal analgesia  PONV status at discharge: No PONV  Anesthetic complications: no      Cardiovascular status: blood pressure returned to baseline and hemodynamically stable  Respiratory status: unassisted  Hydration status: euvolemic  Follow-up not needed.          Vitals Value Taken Time   /85 09/14/23 1852   Temp 36.8 °C (98.2 °F) 09/14/23 1759   Pulse 80 09/14/23 1900   Resp 18 09/14/23 1900   SpO2 93 % 09/14/23 1900   Vitals shown include unvalidated device data.      No case tracking events are documented in the log.      Pain/Sharifa Score: Sharifa Score: 8 (9/14/2023  7:00 PM)

## 2023-09-15 NOTE — PLAN OF CARE
Problem: Physical Therapy  Goal: Physical Therapy Goal  Description: Goals to be met by: 10/15/2023     Patient will increase functional independence with mobility by performin. Supine to sit with [place]  2. Sit to supine with [place]  3. Sit to stand transfer with [place]  4. Gait  x 300 feet with [place] using Rolling Walker vs LRAD.     Outcome: Ongoing, Progressing

## 2023-09-15 NOTE — PLAN OF CARE
Resides with wife, abhay Sabillon. Valeriano present in the room, she is an RN pt has all equipment needed. All needs met

## 2023-09-15 NOTE — PLAN OF CARE
09/15/23 1653   Discharge Assessment   Assessment Type Discharge Planning Assessment   Confirmed/corrected address, phone number and insurance Yes   Confirmed Demographics Correct on Facesheet   Source of Information patient   Does patient/caregiver understand observation status   (inpatient)   Communicated KADI with patient/caregiver Yes   Reason For Admission cervical spondylosis   People in Home spouse   Facility Arrived From: home   Do you expect to return to your current living situation? Yes   Do you have help at home or someone to help you manage your care at home? Yes   Who are your caregiver(s) and their phone number(s)? wife jung blank.  nereida RN   Prior to hospitilization cognitive status: Unable to Assess   Current cognitive status: Alert/Oriented   Equipment Currently Used at Home cane, straight;shower chair;walker, rolling   Readmission within 30 days? No   Patient currently being followed by outpatient case management? No   Do you currently have service(s) that help you manage your care at home? No   Who is going to help you get home at discharge? wife louie   How do you get to doctors appointments? family or friend will provide;car, drives self   Are you on dialysis? No   DME Needed Upon Discharge  none   Discharge Plan discussed with: Spouse/sig other;Patient;Adult children   Name(s) and Number(s) louie wife abhay Bal/RN   Transition of Care Barriers None   Discharge Plan A Home with family   Discharge Plan B Home with family

## 2023-09-15 NOTE — PT/OT/SLP EVAL
Occupational Therapy  Evaluation    Name: Eren Page  MRN: [id number]  Admitting Diagnosis: cervical spondylosis and stenosis with myeloradiculopathy and anterior cervical osteophytosis and dysphagia  Recent Surgery: Procedure(s) (LRB):  LAMINOPLASTY (N/A)  FORAMINOTOMY, SPINE (N/A) 1 Day Post-Op    Recommendations:     Discharge Recommendations: home  Discharge Equipment Recommendations:  none  Barriers to discharge:  None    Assessment:     Eren Page is a 63 y.o. male with a medical diagnosis of cervical spondylosis and stenosis with myeloradiculopathy and anterior cervical osteophytosis and dysphagia s/p C3-C7 laminoplasty. He presents with good effort and participation w/ therapy despite pain and c/o muscle spasms in chest/neck. Some dizziness noted EOB but resolved with sitting EOB x 5 min. Pt ambulated to bathroom and back to bedside recliner with min/mod A. He lives with his wife, both retired, and reports he owns rollator, WC if he were to need. He presents with the following performance deficits affecting function: impaired self care skills, impaired functional mobility, pain.      Rehab Prognosis: Good; patient would benefit from acute skilled OT services to address these deficits and reach maximum level of function.       Plan:     Patient to be seen 3 x/week to address the above listed problems via self-care/home management, therapeutic activities, therapeutic exercises  Plan of Care Expires: 10/13/23  Plan of Care Reviewed with: patient, spouse    Subjective     Chief Complaint: pain at incision in anterior neck, muscle spasms intermittently in chest.  Patient/Family Comments/goals: return home to [facility]    Occupational Profile:  Living Environment: threshold to entry, walk-in shower with shower chair  Previous level of function: independent  Roles and Routines: father, , retired  Equipment Used at Home: shower chair (owns WC, rollator, crutches, and canes, reports he does not currently use them  "and ambulating independently)  Assistance upon Discharge: wife, 4 adult children live close by    Pain/Comfort:  Pain Rating 1:  ("12/10" at incision)  Pain Addressed 1: Distraction    Patients cultural, spiritual, Orthodoxy conflicts given the current situation:      Objective:     OT communicated with Nsg prior to session.      Patient was found HOB elevated with peripheral IV, beth catheter upon OT entry to room.    General Precautions: Standard, fall  Orthopedic Precautions: spinal precautions  Braces: Cervical collar, soft collar      Bed Mobility:    Patient completed Supine to Sit with minimum assistance able to adhere to spinal precautions    Functional Mobility/Transfers:  Patient completed Sit <> Stand Transfer with minimum assistance  with  rolling walker   Functional Mobility: ambulated to bathroom with min A, no LOB     Activities of Daily Living:  Toileting: minimum assistance    LB dressing Total A to don socks, limited by inability to bend at neck      Functional Cognition:  Orientation: oriented to Person, Place, Time, and Situation    Upper Extremity Function:  B UE: WFL    Therapeutic Positioning  Risk for acquired pressure injuries is decreased due to ability to get to BSC/toilet with assist.      Additional Treatment:  ADL Trainin min    Patient Education:  Patient provided with verbal education education regarding OT role/goals/POC and post op precautions.  Understanding was verbalized.     Patient left up in chair with all lines intact and call button in reach    GOALS:   Multidisciplinary Problems       Occupational Therapy Goals          Problem: Occupational Therapy    Goal Priority Disciplines Outcome Interventions   Occupational Therapy Goal     OT, PT/OT Ongoing, Progressing    Description: Goals to be met by: 10/13/23     Patient will increase functional independence with ADLs by performing:    UE Dressing with [place].  LE Dressing with Modified [place] and AD as " needed.  Grooming while standing with Modified [place].  Toileting from toilet with Modified [place] for hygiene and clothing management.   Toilet transfer to toilet with Modified [place].                         History:     Past Medical History:   Diagnosis Date    Arthritis     Back pain     Balance problems     Cervical radiculopathy     Claudication     Depression     Diabetes mellitus     Dizziness     Dorsalgia     Dyslipidemia     Dysphagia 04/2022    GERD (gastroesophageal reflux disease)     Headache     History of chest pain     History of kidney stones     Hypertension     Hypothyroidism     Insomnia     Irregular heart beat     Lumbar radiculopathy     Neck pain     Obesity     Palpitations     Right hip pain     Right leg pain     Right leg weakness     Shoulder pain     Sleep apnea     uses CPAP    SOB (shortness of breath)     Spinal stenosis of lumbar region, unspecified whether neurogenic claudication present     Wears dentures     FULL         Past Surgical History:   Procedure Laterality Date    APPENDECTOMY      BACK SURGERY  2000    L4-5 and L5-S1 ALIF with pedicle screws.    CHOLECYSTOTOMY      COLONOSCOPY      DECOMPRESSION OF LUMBAR SPINE USING MINIMALLY INVASIVE TECHNIQUE Bilateral 06/13/2023    L2-3, L3-4 decompression.  Dr. Barclay    FORAMINOTOMY N/A 9/14/2023    Procedure: FORAMINOTOMY, SPINE;  Surgeon: Renny Barclay MD;  Location: [facility];  Service: Neurosurgery;  Laterality: N/A;  C3-7 laminoplasty, then anterior removal of C2-7 osteophytes (posterior then flip and remove anterior osteophytes)    LAMINOPLASTY N/A 9/14/2023    Procedure: LAMINOPLASTY;  Surgeon: Renny Barclay MD;  Location: [facility];  Service: Neurosurgery;  Laterality: N/A;  C3-7 laminoplasty, then anterior removal of C2-7 osteophytes (posterior then flip and remove anterior osteophytes)  NTI  Owen w/ Adriana  //  XX    TONSILLECTOMY      TOTAL KNEE ARTHROPLASTY Bilateral     VASECTOMY         Time  Tracking:     OT Date of Treatment: 09/15/23  OT Start Time: 0937  OT Stop Time: 1027  OT Total Time (min): 50 min    Billable Minutes:Evaluation 27 min, self-care 23 min    9/15/2023

## 2023-09-16 VITALS
SYSTOLIC BLOOD PRESSURE: 123 MMHG | WEIGHT: 285.69 LBS | BODY MASS INDEX: 37.86 KG/M2 | HEIGHT: 73 IN | RESPIRATION RATE: 20 BRPM | TEMPERATURE: 98 F | DIASTOLIC BLOOD PRESSURE: 75 MMHG | HEART RATE: 86 BPM | OXYGEN SATURATION: 99 %

## 2023-09-16 LAB — POCT GLUCOSE: 115 MG/DL (ref 70–110)

## 2023-09-16 PROCEDURE — 27000221 HC OXYGEN, UP TO 24 HOURS

## 2023-09-16 PROCEDURE — 99024 PR POST-OP FOLLOW-UP VISIT: ICD-10-PCS | Mod: ,,, | Performed by: NEUROLOGICAL SURGERY

## 2023-09-16 PROCEDURE — 97530 THERAPEUTIC ACTIVITIES: CPT | Mod: CQ

## 2023-09-16 PROCEDURE — 97116 GAIT TRAINING THERAPY: CPT | Mod: CQ

## 2023-09-16 PROCEDURE — 99024 POSTOP FOLLOW-UP VISIT: CPT | Mod: ,,, | Performed by: NEUROLOGICAL SURGERY

## 2023-09-16 PROCEDURE — 94761 N-INVAS EAR/PLS OXIMETRY MLT: CPT

## 2023-09-16 PROCEDURE — 25000003 PHARM REV CODE 250: Performed by: NEUROLOGICAL SURGERY

## 2023-09-16 RX ADMIN — LEVOTHYROXINE SODIUM 100 MCG: 100 TABLET ORAL at 05:09

## 2023-09-16 NOTE — DISCHARGE INSTRUCTIONS
** MONITOR YOUR BLOOD PRESSURE WHILE AT HOME.   **CHECK BEFORE TAKE BLOOD PRESSURE MEDICATIONS AND PAIN MEDICATIONS.   **OK to shower. Change dressing daily/in between showers. Do not saturate incision- pat dry before application of new dressing.

## 2023-09-16 NOTE — PT/OT/SLP PROGRESS
Physical Therapy Treatment    Patient Name:  Eren Page   MRN:  [id number]    Recommendations:     Discharge Recommendations: home  Discharge Equipment Recommendations: none  Barriers to discharge: None    Assessment:     Eren Page is a 63 y.o. male admitted with a medical diagnosis of Cervical spondylosis with myelopathy and radiculopathy.  He presents with the following impairments/functional limitations: gait instability, pain, impaired functional mobility .    Rehab Prognosis: Good; patient would benefit from acute skilled PT services to address these deficits and reach maximum level of function.    Recent Surgery: Procedure(s) (LRB):  LAMINOPLASTY (N/A)  FORAMINOTOMY, SPINE (N/A) 2 Days Post-Op    Plan:     During this hospitalization, patient to be seen 5 x/week to address the identified rehab impairments via gait training, therapeutic activities, therapeutic exercises and progress toward the following goals:    Plan of Care Expires:  10/15/23    Subjective     Chief Complaint: none  Patient/Family Comments/goals: Pt states he has been moving around better w/o or w/ less help  Pain/Comfort:  Pain Rating 1: 3/10  Location 1: cervical spine  Pain Addressed 1: Cessation of Activity  Pain Rating Post-Intervention 1: 3/10      Objective:     Communicated with pts nurse prior to session.  Patient found supine with CHUCKIE drain, peripheral IV upon PT entry to room.     General Precautions: Standard, fall  Orthopedic Precautions: spinal precautions  Braces: Cervical collar  Respiratory Status: Room air  Blood Pressure: NT  Skin Integrity: Visible skin intact      Functional Mobility:  Bed Mobility:     Rolling Right: modified independence  Supine to Sit: modified independence  Transfers:     Sit to Stand:  supervision with no AD  Toilet Transfer: supervision with  grab bars  using  Step Transfer  Gait: Pt ambulated in room and bathroom w/o AD w/ cues to improve motor planning and increase safety awareness.  Pt  ambulated 300 ft w/o AD, SPV demonstrating waddle type gait due to R hip pain/weakness.  Pt to have KENDAL in 6 weeks.  Pt had no LOB, consistent foot clearance and heel strike.  Education:  Patient and spouse were provided with verbal education education regarding safety with fucntional mobility to decrease risk for falls.  Understanding was verbalized.     Patient left up in chair with call button in reach and wife present..    GOALS:   Multidisciplinary Problems       Physical Therapy Goals          Problem: Physical Therapy    Goal Priority Disciplines Outcome Goal Variances Interventions   Physical Therapy Goal     PT, PT/OT Ongoing, Progressing     Description: Goals to be met by: 10/15/2023     Patient will increase functional independence with mobility by performin. Supine to sit with [place]  2. Sit to supine with [place]  3. Sit to stand transfer with [place]  4. Gait  x 300 feet with [place] using Rolling Walker vs LRAD.                          Time Tracking:     PT Received On: 23  PT Start Time: 0835     PT Stop Time: 0858  PT Total Time (min): 23 min     Billable Minutes: Gait Training 13 and Therapeutic Activity 10    Treatment Type: Treatment  PT/PTA: PTA     Number of PTA visits since last PT visit: 2023

## 2023-09-16 NOTE — NURSING
Patient awake, alert, and orientedx4 and eager for discharge.   Patient's wife is at bedside.   Anterior and Posterior CHUCKIE drains removed- intact.   Posterior island dressing changed. A few extra dressings provided to patient for home dressing changes per MD's request.   Complete discharge teaching and instructions given- both patient and his wife verbalize understanding of all discharge teaching and instructions and state no further questions or concerns at this time.

## 2023-09-18 ENCOUNTER — PATIENT MESSAGE (OUTPATIENT)
Dept: ADMINISTRATIVE | Facility: OTHER | Age: 63
End: 2023-09-18
Payer: COMMERCIAL

## 2023-09-18 ENCOUNTER — PATIENT OUTREACH (OUTPATIENT)
Dept: ADMINISTRATIVE | Facility: CLINIC | Age: 63
End: 2023-09-18
Payer: COMMERCIAL

## 2023-09-18 NOTE — PROGRESS NOTES
C3 nurse spoke with Eren Page wife Berkley for a TCC post hospital discharge follow up call. The patient has a scheduled HOSFU appointment with Renny Barclay MD 09/27/2023 @[facility].

## 2023-09-19 ENCOUNTER — PATIENT MESSAGE (OUTPATIENT)
Dept: ADMINISTRATIVE | Facility: OTHER | Age: 63
End: 2023-09-19
Payer: COMMERCIAL

## 2023-09-19 NOTE — OP NOTE
DATE OF OPERATION:   September 14, 2023     PREOPERATIVE DIAGNOSIS:   1.  Severe dysphagia secondary to a ventral osteophytosis  2.  Cervical spondylosis and stenosis with myelopathy    POSTOPERATIVE DIAGNOSIS:   1.  Severe dysphagia secondary to a ventral osteophytosis   2.  Cervical spondylosis and stenosis with myelopathy    SURGEON:  Renny Barclay M.D.    ASSISTANT: Jared Aguilar MD     A skilled and specially trained assistant such as Dr Contreras was absolutely essential for me to be able to perform a safe, effective and efficient surgery for this patient. The assistant's role in this case included, but was not limited to:  -safely retracting on critical and sensitive structures using an operating microscope at high magnification  -providing accurate intermittent irrigation of the drill bit during drilling with a 60,000 rpm devicae  -using a 7 Macedonian suction to remove blood, debris and irrigation fluid from a subcentimeter working area      PROCEDURE:   Anterior cervical exploration and resection of C2-C7 ventral osteophytes (COMPLEX)  2.   Microdissection for spinal procedure     ANESTHESIA:   General endotracheal     BLOOD LOSS:   200 cc     SPECIMEN(s):   None     DRAIN:   CHUCKIE in prevertebral space     COMPLICATIONS:   None     HISTORY:   The patient is a 63-year-old gentleman who is 20 years out from L4-5 and L5-S1 fusion.  He is had decompressions at L2-3 and L3-4 in June of this year.  He has significant neck pain radiating to both shoulders as well as trouble with his balance.  Imaging study showed severe multilevel spondylosis and central stenosis with cord flattening.  There was no significant cord edema.  He had severe ventral osteophytes causing dysphagia.  Options were discussed and a posterior decompression followed by an anterior osteophyte resection was elected.  The patient understood and accepted the nature of this surgery as well as its attendant risks.    FINDINGS:   The procedure was  made much more complex by the severe and asymmetric to the left ventral osteophytosis and displacement of the esophagus.  This required extensive, extremely dangerous and very tedious drilling.  At the completion of the procedure there was excellent ventral decompression with some residual osteophyte on the left at the bottom of C2.    PROCEDURE IN DETAIL:  We we had just completed the posterior portion of the procedure and then the patient was placed in the supine position.  Antibiotic re-dosing was too soon at this point The anterior neck was then shaved, prepped and draped.  A longitudinal incision along the medial border of the sternocleidomastoid was then marked out and infiltrated with local anesthetic containing epinephrine.  The incision was carried down through the skin and subcutaneous tissues with the knife.  Unipolar cautery was used to incise the platysma and then sharp and blunt dissection were carried down lateral to the trachea and esophagus and medial to the carotid down to the level of the prevertebral fascia.  Because of his severe osteophytosis, the esophagus was displaced ventrally and then to the left.  Longus colli musculature was undercut on the right side, but the left-sided really could not be reached.  We really did not use self-retaining retractors because of this.  Dr. Contreras held the handheld retractors the whole time protecting the esophagus and other structures from the high-speed drill.  Combination of Leksell rongeurs, Kerrison rongeurs and the high-speed drill with both cutting and jennifer burs was then used to sequentially moved from inferior to superior from C7-C2.  C-arm was used frequently to check the depth of the drilling.  Raw bone surface was covered with wax.  Once there was adequate decompression ventrally, then the wound  was irrigated copiously with antibiotic irrigation and peroxide.  A drain was placed in the prevertebral space and brought out through a separate stab  incision.  The drain was secured with a Tegaderm.  The wound was closed with 3-0 Vicryl for the platysma and running 4-0 Stratafix suture was used for the skin.  Dermabond glue was placed over this.  Patient was then taken to the post anesthetic care unit in satisfactory condition and correct sponge and needle counts

## 2023-09-19 NOTE — OP NOTE
DATE OF OPERATION:   September 14, 2023     PREOPERATIVE DIAGNOSIS:   1. Cervical stenosis with myelopathy     POSTOPERATIVE DIAGNOSIS:   1. Cervical stenosis with myelopathy     SURGEON:  Renny Barclay M.D.    ASSISTANT: Jared Aguilar MD    PROCEDURE:   1. C3-C7 laminoplasties    ANESTHESIA:   General endotracheal     BLOOD LOSS:   200 cc     SPECIMEN(s):   None     COMPLICATIONS:   None     HISTORY:   Patient is a 63-year-old gentleman who is 20 years out from L4-5 and L5-S1 fusion.  He is had decompressions at L2-3 and L3-4 in June of this year.  He has significant neck pain radiating to both shoulders as well as trouble with his balance.  Imaging study showed severe multilevel spondylosis and central stenosis with cord flattening.  There was no significant cord edema.  He had severe ventral osteophytes causing dysphagia.  Options were discussed and a posterior decompression followed by an anterior osteophyte resection was elected.  The patient understood and accepted the nature of this surgery as well as its attendant risks.     FINDINGS:   There were no untoward findings.  It there was nice re-expansion of the thecal sac and central canal at the completion of the procedure.  He tolerated the procedure well.  The anterior approach is dictated separately.    PROCEDURE IN DETAIL:After endotracheal intubation and induction of general anesthesia, the patient was given intravenous antibiotics.  The Chadwick 3-point pin fixation head rest was affixed to the skull, and then the patient was placed into the prone position on chest rolls with pressure points appropriately padded.  Appropriate positioning of the neck was confirmed with the intraoperative C-arm.       The neck was prepped and draped in the usual fashion after marking a midline incision.  The midline subcutaneous tissue as well as the paraspinal muscles bilaterally were then infiltrated with local anesthetic containing epinephrine.  The incision was  "carried down through the skin and subcutaneous tissues in the midline, then unipolar cautery was used to incise the ligamentum nuchae and cervicodorsal fascia in the midline.  Then the paraspinal muscles were elevated bilaterally off of the spinous processes and laminae at the appropriate levels.  Facet joints were exposed bilaterally, but care was taken not to compromise the facet capsules or joints where possible.   Self-retaining retractors were put deeper into the wound.  The [employer] ultrasonic bone scalpel was then used to create a trough bilaterally at each operated level.  On the ipsilateral side, the trough was completed down to the epidural space. On the contralateral side, the bony connection was left intact in order to create a "greenstick" fracture.  Then the ligamentum flavum was released at each level. The spinal laminar unit was then elevated at the operated levels from the ipsilateral side, allowing decompression of the thecal sac.  Further lateral bone removal was carried out as needed to complete the decompression on the ipsilateral side.  Then, at each level an appropriate shaped and length plate was chosen so that screws could be placed into the facet/lamina laterally and into the spinal laminar junction medially. Where possible, two screws were used on each side.  Epidural bleeding was controlled temporarily with Gelfoam pledgets.  These were then removed at the completion of the procedure.       The wound was irrigated copiously with antibiotic irrigation and then a drain was placed in the submuscular space and brought out through a separate stab incision.  Stimulan antibiotic impregnated beads were then placed in the subfascial space.  The wound was then closed with 0 Vicryl for the fascia, 2-0 Vicryl for the subcutaneous tissue, and staples for the skin edges.  A local dressing was applied.  The patient was then placed onto the bed and then turned into the supine position on the operating " table with pressure points appropriately padded.  The anterior portion is dictated separately.    The anterior neck was then shaved, prepped and draped.  A longitudinal incision along the medial border of the sternocleidomastoid was then marked out and infiltrated with local anesthetic containing epinephrine.  The incision was carried down through the skin and subcutaneous tissues with the knife.  Unipolar cautery was used to incise the platysma and then sharp and blunt dissection were carried down lateral to the trachea and esophagus and medial to the carotid down to the level of the prevertebral fascia.  Because of his severe osteophytosis

## 2023-09-21 ENCOUNTER — CLINICAL SUPPORT (OUTPATIENT)
Dept: NEUROSURGERY | Facility: CLINIC | Age: 63
End: 2023-09-21
Payer: COMMERCIAL

## 2023-09-21 DIAGNOSIS — M47.12 CERVICAL SPONDYLOSIS WITH MYELOPATHY AND RADICULOPATHY: Primary | ICD-10-CM

## 2023-09-21 DIAGNOSIS — M47.22 CERVICAL SPONDYLOSIS WITH MYELOPATHY AND RADICULOPATHY: Primary | ICD-10-CM

## 2023-09-21 NOTE — PROGRESS NOTES
[facility]  Neurosurgery    CHIEF COMPLAINT:    Post-operative wound check/staple removal    HPI:    Eren Page is a 63 y.o.-year-old male who presents today for staple removal.  He is s/p C3-7 laminoplasty, anterior removal of C2-7 osteophytes that was done on 9/14/23.  He denies fevers, chills, night sweats or N/V. He is experiencing muscles spasms to upper back that are controlled with the Cyclobenzaprine. Overall, he feels he is doing well.       Review of patient's allergies indicates:   Allergen Reactions    Adhesive tape-silicones Other (See Comments)     welps and skin breakdown       Current Outpatient Medications   Medication Sig Dispense Refill    amLODIPine (NORVASC) 10 MG tablet Take 10 mg by mouth once daily.      cholecalciferol, vitamin D3, (VITAMIN D3) 25 mcg (1,000 unit) capsule Take 1,000 Units by mouth once daily.      cyclobenzaprine (FLEXERIL) 10 MG tablet Take 1 tablet (10 mg total) by mouth 3 (three) times daily as needed for Muscle spasms. 30 tablet 2    FLUoxetine 20 MG capsule Take 1 capsule (20 mg total) by mouth once daily. 90 capsule 0    furosemide (LASIX) 40 MG tablet Take 40 mg by mouth 2 (two) times daily.      glimepiride (AMARYL) 4 MG tablet Take 1 tablet (4 mg total) by mouth once daily. Take one tablet daily. 90 tablet 1    HYDROcodone-acetaminophen (NORCO)  mg per tablet Take 1 tablet by mouth every 4 (four) hours as needed for Pain. 42 tablet 0    ibuprofen-famotidine (DUEXIS) 800-26.6 mg Tab Take 1 tablet by mouth once daily.      levothyroxine (SYNTHROID) 100 MCG tablet 1 tablet on an empty stomach in the morning 90 tablet 0    lisinopriL-hydrochlorothiazide (PRINZIDE,ZESTORETIC) 20-25 mg Tab Take 1 tablet by mouth once daily. 90 tablet 0    meloxicam (MOBIC) 15 MG tablet Take 15 mg by mouth once daily.      metFORMIN (GLUCOPHAGE) 1000 MG tablet Take 1 tablet (1,000 mg total) by mouth 2 (two) times a day. Take one tablet by mouth twice daily. 180  tablet 1    naltrexone-bupropion (CONTRAVE) 8-90 mg TbSR Take by mouth.      pantoprazole (PROTONIX) 40 MG tablet Take 1 tablet (40 mg total) by mouth 2 (two) times daily. (Patient taking differently: Take 40 mg by mouth once daily.) 60 tablet 11    potassium chloride (KLOR-CON) 10 MEQ TbSR Take 10 mEq by mouth once daily.      rosuvastatin (CRESTOR) 5 MG tablet Take 1 tablet (5 mg total) by mouth after dinner. 90 tablet 0    semaglutide (OZEMPIC) 0.25 mg or 0.5 mg (2 mg/3 mL) pen injector Inject 0.5 mg into the skin every 7 days. 3 mL 2    traZODone (DESYREL) 50 MG tablet Take 50 mg by mouth every evening.      zolpidem (AMBIEN) 10 mg Tab Take 5 mg by mouth nightly as needed.       No current facility-administered medications for this visit.       Past Medical History:   Diagnosis Date    Arthritis     Back pain     Balance problems     Cervical radiculopathy     Claudication     Depression     Diabetes mellitus     Dizziness     Dorsalgia     Dyslipidemia     Dysphagia 04/2022    GERD (gastroesophageal reflux disease)     Headache     History of chest pain     History of kidney stones     Hypertension     Hypothyroidism     Insomnia     Irregular heart beat     Lumbar radiculopathy     Neck pain     Obesity     Palpitations     Right hip pain     Right leg pain     Right leg weakness     Shoulder pain     Sleep apnea     uses CPAP    SOB (shortness of breath)     Spinal stenosis of lumbar region, unspecified whether neurogenic claudication present     Wears dentures     FULL     Past Surgical History:   Procedure Laterality Date    APPENDECTOMY      BACK SURGERY  2000    L4-5 and L5-S1 ALIF with pedicle screws.    CHOLECYSTOTOMY      COLONOSCOPY      DECOMPRESSION OF LUMBAR SPINE USING MINIMALLY INVASIVE TECHNIQUE Bilateral 06/13/2023    L2-3, L3-4 decompression.  Dr. Barclay    FORAMINOTOMY N/A 9/14/2023    Procedure: FORAMINOTOMY, SPINE;  Surgeon: Renny Barclay MD;  Location: [facility];  Service: Neurosurgery;   Laterality: N/A;  C3-7 laminoplasty, then anterior removal of C2-7 osteophytes (posterior then flip and remove anterior osteophytes)    LAMINOPLASTY N/A 9/14/2023    Procedure: LAMINOPLASTY;  Surgeon: Renny Barclay MD;  Location: [facility] OR;  Service: Neurosurgery;  Laterality: N/A;  C3-7 laminoplasty, then anterior removal of C2-7 osteophytes (posterior then flip and remove anterior osteophytes)  NTI  Owen w/ Adriana  //  XX    TONSILLECTOMY      TOTAL KNEE ARTHROPLASTY Bilateral     VASECTOMY       Family History       Problem Relation (Age of Onset)    Cancer Sister, Sister    Cervical cancer Mother    Colon cancer Father    Heart attack Father (82)    Heart disease Mother (76)    No Known Problems Brother, Brother, Brother, Brother, Brother          Social History     Socioeconomic History    Marital status:     Number of children: 4   Tobacco Use    Smoking status: Never    Smokeless tobacco: Never   Substance and Sexual Activity    Alcohol use: Yes     Comment: rarely    Drug use: Never         Physical Exam:    General: well developed, well nourished, no distress  Neurologic: Alert and oriented. Thought content appropriate.   Cranial nerves: face symmetric, pupils equal, round, reactive to light with accomodation, EOMI.   Motor Strength: moves all extremities with good strength and tone      Posterior Cervical incision:  C/D/I  Wound edges well-approximated  Staples intact, removed without difficulty    Anterior cervical incision:  C/D/I  Wound edges well approximated  Dermabond glue intact    Gait:  antalgic and limping        Assessment/Plan:     -Keep incision open to air   -Can shower and get incision wet, just pat dry and no vigorous scrubbing. Do not submerge incision for another 2 weeks.   -Follow up with Dr. Barclay as scheduled on 9/27/23 with xrays just prior.  -Encouraged patient to call if they have any questions or concerns prior to next follow up appt      Marguerite Ahuja LPN

## 2023-09-22 NOTE — DISCHARGE SUMMARY
[facility]  Neurosurgery  Discharge Summary      Patient Name: Eren Page  MRN: [id number]  Admission Date: 9/14/2023  Hospital Length of Stay: 2 days  Discharge Date and Time: 9/16/2023  1:09 PM  Attending Physician: No att. providers found   Discharging Provider: Renny Barclay MD  Primary Care Provider: Arun An MD    HPI:   No notes on file    Procedure(s) (LRB):  LAMINOPLASTY (N/A)  FORAMINOTOMY, SPINE (N/A)     Hospital Course: No notes on file   The patient underwent C3-C7 laminoplasty followed by C2-C7 anterior osteophytectomy without incident.  Remarkably, the patient's swallowing was improved compared to preop and he did well postoperatively.  He is now being discharged in improved condition with a good prognosis.  His diet and activity are as tolerated.  He is to wear a soft collar when up and about.     Goals of Care Treatment Preferences:  Code Status: Full Code      Consults:     Significant Diagnostic Studies: N/A    Pending Diagnostic Studies:     None        Final Active Diagnoses:    Diagnosis Date Noted POA    PRINCIPAL PROBLEM:  Cervical spondylosis with myelopathy and radiculopathy [M47.12, M47.22] 09/14/2023 Unknown      Problems Resolved During this Admission:      Discharged Condition: good     Disposition: Home or Self Care    Follow Up:   Follow-up Information     Renny Barclay MD. Go on 9/21/2023.    Specialty: Neurosurgery  Why: Nurse Visit 09/21/2023 @10am   Post Op Appt 09/27/2023 @0930am  Contact information:  [address] [place] [address]  [address] [place] [address] [id number] [phone]                       Patient Instructions:      Diet general     Call MD for:  temperature >100.4     Call MD for:  persistent nausea and vomiting     Call MD for:  severe uncontrolled pain     Call MD for:  difficulty breathing, headache or visual disturbances     Call MD for:  redness, tenderness, or signs of infection (pain, swelling, redness, odor or green/yellow  "discharge around incision site)     Call MD for:  hives     Call MD for:  persistent dizziness or light-headedness     Call MD for:  extreme fatigue     Wound care routine (specify)   Order Comments: Wound care routine:  May wash front of neck with soap/water now  Remove posterior (back of neck) dressing Sunday 9/17 and then OK to wash both back and front of neck daily  Cover staples on back of neck with "island" dressing between showering so that collar does not rub on incision  Empty and record drain output as needed; full compression after emptying  Call office Sunday AM to report drain outputs     Medications:  Reconciled Home Medications:      Medication List      CHANGE how you take these medications    HYDROcodone-acetaminophen  mg per tablet  Commonly known as: NORCO  Take 1 tablet by mouth every 4 (four) hours as needed for Pain.  What changed: when to take this     pantoprazole 40 MG tablet  Commonly known as: PROTONIX  Take 1 tablet (40 mg total) by mouth 2 (two) times daily.  What changed: when to take this        CONTINUE taking these medications    amLODIPine 10 MG tablet  Commonly known as: NORVASC  Take 10 mg by mouth once daily.     cholecalciferol (vitamin D3) 25 mcg (1,000 unit) capsule  Commonly known as: VITAMIN D3  Take 1,000 Units by mouth once daily.     CONTRAVE 8-90 mg Tbsr  Generic drug: naltrexone-bupropion  Take by mouth.     cyclobenzaprine 10 MG tablet  Commonly known as: FLEXERIL  Take 1 tablet (10 mg total) by mouth 3 (three) times daily as needed for Muscle spasms.     DUEXIS 800-26.6 mg Tab  Generic drug: ibuprofen-famotidine  Take 1 tablet by mouth once daily.     FLUoxetine 20 MG capsule  Take 1 capsule (20 mg total) by mouth once daily.     furosemide 40 MG tablet  Commonly known as: LASIX  Take 40 mg by mouth 2 (two) times daily.     glimepiride 4 MG tablet  Commonly known as: AMARYL  Take 1 tablet (4 mg total) by mouth once daily. Take one tablet daily.     levothyroxine " 100 MCG tablet  Commonly known as: SYNTHROID  1 tablet on an empty stomach in the morning     lisinopriL-hydrochlorothiazide 20-25 mg Tab  Commonly known as: PRINZIDE,ZESTORETIC  Take 1 tablet by mouth once daily.     meloxicam 15 MG tablet  Commonly known as: MOBIC  Take 15 mg by mouth once daily.     metFORMIN 1000 MG tablet  Commonly known as: GLUCOPHAGE  Take 1 tablet (1,000 mg total) by mouth 2 (two) times a day. Take one tablet by mouth twice daily.     potassium chloride 10 MEQ Tbsr  Commonly known as: KLOR-CON  Take 10 mEq by mouth once daily.     rosuvastatin 5 MG tablet  Commonly known as: CRESTOR  Take 1 tablet (5 mg total) by mouth after dinner.     semaglutide 0.25 mg or 0.5 mg (2 mg/3 mL) pen injector  Commonly known as: OZEMPIC  Inject 0.5 mg into the skin every 7 days.     traZODone 50 MG tablet  Commonly known as: DESYREL  Take 50 mg by mouth every evening.     zolpidem 10 mg Tab  Commonly known as: AMBIEN  Take 5 mg by mouth nightly as needed.            Renny Barclay MD  Neurosurgery  [facility]

## 2023-09-27 ENCOUNTER — HOSPITAL ENCOUNTER (OUTPATIENT)
Dept: RADIOLOGY | Facility: HOSPITAL | Age: 63
Discharge: HOME | End: 2023-09-27
Attending: PHYSICIAN ASSISTANT
Payer: COMMERCIAL

## 2023-09-27 ENCOUNTER — OFFICE VISIT (OUTPATIENT)
Dept: NEUROSURGERY | Facility: CLINIC | Age: 63
End: 2023-09-27
Payer: COMMERCIAL

## 2023-09-27 VITALS
HEIGHT: 73 IN | RESPIRATION RATE: 16 BRPM | HEART RATE: 86 BPM | TEMPERATURE: 98 F | BODY MASS INDEX: 38.3 KG/M2 | SYSTOLIC BLOOD PRESSURE: 117 MMHG | WEIGHT: 289 LBS | DIASTOLIC BLOOD PRESSURE: 80 MMHG

## 2023-09-27 DIAGNOSIS — M48.062 SPINAL STENOSIS OF LUMBAR REGION WITH NEUROGENIC CLAUDICATION: ICD-10-CM

## 2023-09-27 DIAGNOSIS — M47.22 CERVICAL SPONDYLOSIS WITH RADICULOPATHY: ICD-10-CM

## 2023-09-27 DIAGNOSIS — M48.02 SPINAL STENOSIS, CERVICAL REGION: ICD-10-CM

## 2023-09-27 DIAGNOSIS — Z98.890 POST-OPERATIVE STATE: ICD-10-CM

## 2023-09-27 DIAGNOSIS — G56.03 BILATERAL CARPAL TUNNEL SYNDROME: ICD-10-CM

## 2023-09-27 DIAGNOSIS — M47.22 CERVICAL SPONDYLOSIS WITH MYELOPATHY AND RADICULOPATHY: Primary | ICD-10-CM

## 2023-09-27 DIAGNOSIS — M47.12 CERVICAL SPONDYLOSIS WITH MYELOPATHY AND RADICULOPATHY: Primary | ICD-10-CM

## 2023-09-27 PROCEDURE — 3079F DIAST BP 80-89 MM HG: CPT | Mod: CPTII,,, | Performed by: NEUROLOGICAL SURGERY

## 2023-09-27 PROCEDURE — 1101F PT FALLS ASSESS-DOCD LE1/YR: CPT | Mod: CPTII,,, | Performed by: NEUROLOGICAL SURGERY

## 2023-09-27 PROCEDURE — 3288F FALL RISK ASSESSMENT DOCD: CPT | Mod: CPTII,,, | Performed by: NEUROLOGICAL SURGERY

## 2023-09-27 PROCEDURE — 72040 X-RAY EXAM NECK SPINE 2-3 VW: CPT | Mod: TC

## 2023-09-27 PROCEDURE — 99024 POSTOP FOLLOW-UP VISIT: CPT | Mod: ,,, | Performed by: NEUROLOGICAL SURGERY

## 2023-09-27 PROCEDURE — 1125F AMNT PAIN NOTED PAIN PRSNT: CPT | Mod: CPTII,,, | Performed by: NEUROLOGICAL SURGERY

## 2023-09-27 PROCEDURE — 3074F SYST BP LT 130 MM HG: CPT | Mod: CPTII,,, | Performed by: NEUROLOGICAL SURGERY

## 2023-09-27 RX ORDER — METHOCARBAMOL 750 MG/1
750-1500 TABLET, FILM COATED ORAL 4 TIMES DAILY PRN
Qty: 60 TABLET | Refills: 2 | Status: SHIPPED | OUTPATIENT
Start: 2023-09-27 | End: 2023-10-20

## 2023-11-04 RX ORDER — HYDROCODONE BITARTRATE AND ACETAMINOPHEN 10; 325 MG/1; MG/1
1 TABLET ORAL EVERY 6 HOURS PRN
Qty: 28 TABLET | Refills: 0 | Status: SHIPPED | OUTPATIENT
Start: 2023-11-04 | End: 2023-12-12

## 2023-12-08 NOTE — PROGRESS NOTES
[facility]  History & Physical  Neurosurgery      Eren Page   [id number]   1960     SUBJECTIVE:     CHIEF COMPLAINT:  3 month postoperative visit    HPI:  Eren Page is a 63 y.o. male who presents for a 3 month post-operative follow-up.  He is s/p  C3-7 laminoplasties & anterior exploration & resection of C2-7 ventral osteophytes  by Dr. Barclay on 9/14/2023.     The patient was last seen by Dr. Barclay on 9/14/2023 describing spasming into the posterior neck and interscapular region greater on the left than right.  At that time he stated his pain was different than his preoperative symptoms.  He denied any aching into the head but did notice some sensitivity to the base of the skull.  He continued to have limited range of motion of the cervical spine.  He describes numbness into the bilateral arms and hands.  On 10/18/2023 the patient underwent an EMG nerve conduction study with Dr. Granados per Dr. Barclay's recommendations which revealed bilateral median and ulnar neuropathy.  It was recommended at the patient's previous visit that he begin splinting at night for his wrists although the patient states he did not receive orders to obtain these medical devices.  He continues to have some tightness into the left neck radiating into the left trapezius and interscapular region.  He describes an occasional stabbing pain into the left interscapular region.  He continues to have stiffness and numbness into the bilateral arms and hands greater on the left.  Has completed physical therapy although he feels this did not provide him much improvement of his symptoms.  He states his lower back has been doing well although he will have some achiness and stiffness with prolonged physical activity.  His symptoms continue to affect his sleep at night therefore he has been sleeping in a recliner.  He is denying any changes in bowel or bladder function at this time.    Past Medical History:   Diagnosis Date     Arthritis     Back pain     Balance problems     Cervical spondylosis with radiculopathy     Claudication     Depression     Diabetes mellitus     Dizziness     Dorsalgia     Dyslipidemia     Dysphagia 04/2022    GERD (gastroesophageal reflux disease)     Headache     History of chest pain     History of kidney stones     Hypertension     Hypothyroidism     Insomnia     Irregular heart beat     Obesity     Palpitations     Right hip pain     Right leg pain     Right leg weakness     Shoulder pain     Sleep apnea     uses CPAP    SOB (shortness of breath)     Spinal stenosis of lumbar region, unspecified whether neurogenic claudication present     Wears dentures     FULL       Past Surgical History:   Procedure Laterality Date    APPENDECTOMY      BACK SURGERY  2000    L4-5 and L5-S1 ALIF with pedicle screws.    CHOLECYSTOTOMY      COLONOSCOPY      DECOMPRESSION OF LUMBAR SPINE USING MINIMALLY INVASIVE TECHNIQUE Bilateral 06/13/2023    L2-3, L3-4 decompression.  Dr. Barclay    FORAMINOTOMY N/A 9/14/2023    Procedure: FORAMINOTOMY, SPINE;  Surgeon: Renny Barclay MD;  Location: [facility];  Service: Neurosurgery;  Laterality: N/A;  C3-7 laminoplasty, then anterior removal of C2-7 osteophytes (posterior then flip and remove anterior osteophytes)    LAMINOPLASTY N/A 9/14/2023    Procedure: LAMINOPLASTY;  Surgeon: Renny Barclay MD;  Location: [facility];  Service: Neurosurgery;  Laterality: N/A;  C3-7 laminoplasty, then anterior removal of C2-7 osteophytes (posterior then flip and remove anterior osteophytes)  NTI  Owen w/ Adriana  //  XX    TONSILLECTOMY      TOTAL KNEE ARTHROPLASTY Bilateral     VASECTOMY         Family History   Problem Relation Age of Onset    Cervical cancer Mother     Heart disease Mother 76    Heart attack Father 82    Colon cancer Father     Cancer Sister     No Known Problems Brother     Cancer Sister     No Known Problems Brother     No Known Problems Brother     No Known Problems Brother     No  Known Problems Brother        Social History     Socioeconomic History    Marital status:     Number of children: 4   Tobacco Use    Smoking status: Never    Smokeless tobacco: Never   Substance and Sexual Activity    Alcohol use: Yes     Comment: rarely    Drug use: Never       Review of patient's allergies indicates:   Allergen Reactions    Adhesive tape-silicones Other (See Comments)     welps and skin breakdown        Current Outpatient Medications   Medication Instructions    amLODIPine (NORVASC) 10 mg, Oral, Daily    cholecalciferol (vitamin D3) (VITAMIN D3) 1,000 Units, Oral, Daily    cyclobenzaprine (FLEXERIL) 10 mg, Oral, 3 times daily PRN    FLUoxetine 20 mg, Oral, Daily    furosemide (LASIX) 40 mg, Oral, 2 times daily    glimepiride (AMARYL) 4 mg, Oral, Daily, Take one tablet daily.    ibuprofen-famotidine (DUEXIS) 800-26.6 mg Tab 1 tablet, Oral, Daily    levothyroxine (SYNTHROID) 100 MCG tablet 1 tablet on an empty stomach in the morning    lisinopriL-hydrochlorothiazide (PRINZIDE,ZESTORETIC) 20-25 mg Tab 1 tablet, Oral, Daily    meloxicam (MOBIC) 15 mg, Oral, Daily    metFORMIN (GLUCOPHAGE) 1,000 mg, Oral, 2 times daily, Take one tablet by mouth twice daily.    naltrexone-bupropion (CONTRAVE) 8-90 mg TbSR Oral    pantoprazole (PROTONIX) 40 mg, Oral, Daily    pioglitazone (ACTOS) 15 MG tablet 1 tablet Orally Once a day    potassium chloride (KLOR-CON) 10 MEQ TbSR 10 mEq, Oral, Daily    rosuvastatin (CRESTOR) 5 mg, Oral, After dinner    semaglutide (OZEMPIC) 0.5 mg, Subcutaneous, Every 7 days    traZODone (DESYREL) 50 mg, Oral, Nightly    zolpidem (AMBIEN) 5 mg, Oral, Nightly PRN          Review of Systems   Constitutional:  Negative for chills, fever and weight loss.   HENT:  Negative for congestion, hearing loss, nosebleeds and tinnitus.    Eyes:  Negative for blurred vision, double vision and photophobia.   Respiratory:  Negative for cough, shortness of breath and wheezing.    Cardiovascular:   "Negative for chest pain, palpitations and leg swelling.   Gastrointestinal:  Negative for constipation, diarrhea, nausea and vomiting.   Genitourinary:  Negative for dysuria, frequency and urgency.   Musculoskeletal:  Positive for back pain, joint pain (right hip pain) and neck pain. Negative for falls.   Skin:  Negative for itching and rash.   Neurological:  Positive for tingling and weakness. Negative for dizziness, tremors, sensory change, speech change, seizures, loss of consciousness and headaches.   Psychiatric/Behavioral:  Negative for depression, hallucinations and memory loss. The patient is not nervous/anxious.          OBJECTIVE:     Physical Exam    Visit Vitals  /87 (BP Location: Left arm, Patient Position: Sitting)   Pulse 82   Resp 16   Ht 6' 1" (1.854 m)   Wt 131.1 kg (289 lb)   BMI 38.13 kg/m²        General:  Pleasant, Well-nourished, Well-groomed.    Cardiovascular:  Heart has regular rate and rhythm.    Lungs:  Breathing is quiet, non-lablored    Musculoskeletal:  Incision: no significant drainage, no dehiscence, no significant erythema, well healed.  ROM is Decreased secondary to pain  Slightly limited range of motion with rotation to the left-greater-than-right as well as cervical extension and flexion.    Neurological:  Muscle strength against resistance:   Right Left   Deltoid (C5) 5/5 5/5   Biceps (C5/6) 5/5 5/5   Brachioradialis (C5/6) 5/5 5/5   Triceps (C7) 5/5 5/5   Wrist extension (C7) 5/5 5/5   Finger abduction (C8) 5/5 5/5    5/5 5/5   Sensation is intact to primary modalities in bilateral upper and lower extremities.    Reflexes:  Negative Babinski, Clonus, Wiseman, Tinel's, and Phalen's bilaterally.  Positive Tinel's: Left  Gait is normal  Coordination is normal.    Imaging:  All pertinent neuroimaging independently reviewed. Discussed these findings in detail with the patient.    ASSESSMENT:       ICD-10-CM ICD-9-CM   1. Bilateral carpal tunnel syndrome  G56.03 354.0   2. " Ulnar neuropathy of both upper extremities  G56.23 354.2   3. Cervical spondylosis with myelopathy and radiculopathy  M47.12 721.1    M47.22 723.4       PLAN:   1. Bilateral carpal tunnel syndrome  - HME - OTHER  - Ambulatory referral/consult to Physical/Occupational Therapy; Future    2. Ulnar neuropathy of both upper extremities  - Ambulatory referral/consult to Physical/Occupational Therapy; Future    3. Cervical spondylosis with myelopathy and radiculopathy      Eren Page returns today reporting continued stiffness in his neck as well as numbness and weakness into left greater than right upper extremities.  At this time I am recommending the patient begin cock-up wrist splints nightly for bilateral carpal tunnel syndrome.  I also would like the patient begin some outpatient occupational therapy for bilateral ulnar and median neuropathy.  He was encouraged to begin some massage therapy regarding the stiffness in his neck as he states this did provide a temporary relief when completed at outpatient physical therapy.  He was encouraged to continue on with his home lumbar core stabilization exercises as well as neck structures and strengthening exercises.  We will plan to see the patient back in clinic in approximately 3 months for an occupational therapy follow up visit.  He is cleared on a neurosurgical standpoint for his hips per Dr. Granados.    Follow up in about 3 months (around 3/12/2024) for PT Follow Up.    E/M Level Based On Time:   15 minutes spent on reviewing chart, which includes interpreting lab results and diagnostic tests.   20 minutes spent in the room with the patient performing a history and physical exam, counseling or educating the patient/caregiver, prescribing medications, ordering labwork/diagnostic tests, or placing referrals.   5 minutes spent collaborating plan of care with physician.   5 minutes spent documenting all relevant clinical informationin the electronic health record.      Total Time Spent: 45 minutes         OTIS Avery    Disclaimer:  This note is prepared using voice recognition software and as such is likely to have errors despite attempts at proofreading. Please contact me for questions.

## 2023-12-12 ENCOUNTER — OFFICE VISIT (OUTPATIENT)
Dept: NEUROSURGERY | Facility: CLINIC | Age: 63
End: 2023-12-12
Payer: COMMERCIAL

## 2023-12-12 VITALS
HEIGHT: 73 IN | DIASTOLIC BLOOD PRESSURE: 87 MMHG | BODY MASS INDEX: 38.3 KG/M2 | SYSTOLIC BLOOD PRESSURE: 128 MMHG | RESPIRATION RATE: 16 BRPM | HEART RATE: 82 BPM | WEIGHT: 289 LBS

## 2023-12-12 DIAGNOSIS — G56.23 ULNAR NEUROPATHY OF BOTH UPPER EXTREMITIES: ICD-10-CM

## 2023-12-12 DIAGNOSIS — G56.03 BILATERAL CARPAL TUNNEL SYNDROME: Primary | ICD-10-CM

## 2023-12-12 DIAGNOSIS — M47.12 CERVICAL SPONDYLOSIS WITH MYELOPATHY AND RADICULOPATHY: ICD-10-CM

## 2023-12-12 DIAGNOSIS — M47.22 CERVICAL SPONDYLOSIS WITH MYELOPATHY AND RADICULOPATHY: ICD-10-CM

## 2023-12-12 PROCEDURE — 3079F PR MOST RECENT DIASTOLIC BLOOD PRESSURE 80-89 MM HG: ICD-10-PCS | Mod: CPTII,,, | Performed by: NURSE PRACTITIONER

## 2023-12-12 PROCEDURE — 1101F PT FALLS ASSESS-DOCD LE1/YR: CPT | Mod: CPTII,,, | Performed by: NURSE PRACTITIONER

## 2023-12-12 PROCEDURE — 3008F BODY MASS INDEX DOCD: CPT | Mod: CPTII,,, | Performed by: NURSE PRACTITIONER

## 2023-12-12 PROCEDURE — 3066F PR DOCUMENTATION OF TREATMENT FOR NEPHROPATHY: ICD-10-PCS | Mod: CPTII,,, | Performed by: NURSE PRACTITIONER

## 2023-12-12 PROCEDURE — 1160F PR REVIEW ALL MEDS BY PRESCRIBER/CLIN PHARMACIST DOCUMENTED: ICD-10-PCS | Mod: CPTII,,, | Performed by: NURSE PRACTITIONER

## 2023-12-12 PROCEDURE — 3008F PR BODY MASS INDEX (BMI) DOCUMENTED: ICD-10-PCS | Mod: CPTII,,, | Performed by: NURSE PRACTITIONER

## 2023-12-12 PROCEDURE — 3074F PR MOST RECENT SYSTOLIC BLOOD PRESSURE < 130 MM HG: ICD-10-PCS | Mod: CPTII,,, | Performed by: NURSE PRACTITIONER

## 2023-12-12 PROCEDURE — 3066F NEPHROPATHY DOC TX: CPT | Mod: CPTII,,, | Performed by: NURSE PRACTITIONER

## 2023-12-12 PROCEDURE — 3061F PR NEG MICROALBUMINURIA RESULT DOCUMENTED/REVIEW: ICD-10-PCS | Mod: CPTII,,, | Performed by: NURSE PRACTITIONER

## 2023-12-12 PROCEDURE — 1101F PR PT FALLS ASSESS DOC 0-1 FALLS W/OUT INJ PAST YR: ICD-10-PCS | Mod: CPTII,,, | Performed by: NURSE PRACTITIONER

## 2023-12-12 PROCEDURE — 3074F SYST BP LT 130 MM HG: CPT | Mod: CPTII,,, | Performed by: NURSE PRACTITIONER

## 2023-12-12 PROCEDURE — 4010F ACE/ARB THERAPY RXD/TAKEN: CPT | Mod: CPTII,,, | Performed by: NURSE PRACTITIONER

## 2023-12-12 PROCEDURE — 4010F PR ACE/ARB THEARPY RXD/TAKEN: ICD-10-PCS | Mod: CPTII,,, | Performed by: NURSE PRACTITIONER

## 2023-12-12 PROCEDURE — 1160F RVW MEDS BY RX/DR IN RCRD: CPT | Mod: CPTII,,, | Performed by: NURSE PRACTITIONER

## 2023-12-12 PROCEDURE — 3288F PR FALLS RISK ASSESSMENT DOCUMENTED: ICD-10-PCS | Mod: CPTII,,, | Performed by: NURSE PRACTITIONER

## 2023-12-12 PROCEDURE — 1159F PR MEDICATION LIST DOCUMENTED IN MEDICAL RECORD: ICD-10-PCS | Mod: CPTII,,, | Performed by: NURSE PRACTITIONER

## 2023-12-12 PROCEDURE — 3288F FALL RISK ASSESSMENT DOCD: CPT | Mod: CPTII,,, | Performed by: NURSE PRACTITIONER

## 2023-12-12 PROCEDURE — 99215 PR OFFICE/OUTPT VISIT, EST, LEVL V, 40-54 MIN: ICD-10-PCS | Mod: ,,, | Performed by: NURSE PRACTITIONER

## 2023-12-12 PROCEDURE — 1159F MED LIST DOCD IN RCRD: CPT | Mod: CPTII,,, | Performed by: NURSE PRACTITIONER

## 2023-12-12 PROCEDURE — 3044F HG A1C LEVEL LT 7.0%: CPT | Mod: CPTII,,, | Performed by: NURSE PRACTITIONER

## 2023-12-12 PROCEDURE — 3044F PR MOST RECENT HEMOGLOBIN A1C LEVEL <7.0%: ICD-10-PCS | Mod: CPTII,,, | Performed by: NURSE PRACTITIONER

## 2023-12-12 PROCEDURE — 99215 OFFICE O/P EST HI 40 MIN: CPT | Mod: ,,, | Performed by: NURSE PRACTITIONER

## 2023-12-12 PROCEDURE — 3079F DIAST BP 80-89 MM HG: CPT | Mod: CPTII,,, | Performed by: NURSE PRACTITIONER

## 2023-12-12 PROCEDURE — 3061F NEG MICROALBUMINURIA REV: CPT | Mod: CPTII,,, | Performed by: NURSE PRACTITIONER

## 2024-01-06 NOTE — PROGRESS NOTES
[facility]  Neurosurgery        Eren Page   [id number]   1960       SUBJECTIVE:       CHIEF COMPLAINT:    2 week post-op    HPI:    Eren Page is a 63 y.o.-year-old male who presents today for post-operative follow-up.  He is s/p C3-7 laminoplasties & anterior exploration & resection of C2-7 ventral osteophytes that was done on 9/14/23.  He complains of surgical pain and spasms in the neck and interscapular region, worse on the left than the right.  He says the pain is different than what he experienced prior to surgery.  He no longer has aching up into the head, but he notices sensitivity to touch at the base of the skull.  He continues with limited cervical range of motion.  He reports improvement in swallowing since surgery.  He notices that when he lays on either side, the ipsilateral arm becomes numb.  He must sit up and shake the arm for relief.  He also notices numbness in the last two digits of the right hand with flexion of the elbow.  He continues to have weakness in the left shoulder and limited range of motion due to problems with the shoulder.  He feels he is walking better since having an injection in the hip prior to surgery.  He says Dr. Granados is planning surgery on the hip once he recovers from his recent procedure.      Review of patient's allergies indicates:   Allergen Reactions    Adhesive tape-silicones Other (See Comments)     welps and skin breakdown     Current Outpatient Medications   Medication Sig Dispense Refill    amLODIPine (NORVASC) 10 MG tablet Take 10 mg by mouth once daily.      cholecalciferol, vitamin D3, (VITAMIN D3) 25 mcg (1,000 unit) capsule Take 1,000 Units by mouth once daily.      cyclobenzaprine (FLEXERIL) 10 MG tablet Take 1 tablet (10 mg total) by mouth 3 (three) times daily as needed for Muscle spasms. 30 tablet 2    FLUoxetine 20 MG capsule Take 1 capsule (20 mg total) by mouth once daily. 90 capsule 0    furosemide (LASIX) 40 MG tablet Take 40  mg by mouth 2 (two) times daily.      glimepiride (AMARYL) 4 MG tablet Take 1 tablet (4 mg total) by mouth once daily. Take one tablet daily. 90 tablet 1    HYDROcodone-acetaminophen (NORCO)  mg per tablet Take 1 tablet by mouth every 4 (four) hours as needed for Pain. 42 tablet 0    levothyroxine (SYNTHROID) 100 MCG tablet 1 tablet on an empty stomach in the morning 90 tablet 0    lisinopriL-hydrochlorothiazide (PRINZIDE,ZESTORETIC) 20-25 mg Tab Take 1 tablet by mouth once daily. 90 tablet 0    metFORMIN (GLUCOPHAGE) 1000 MG tablet Take 1 tablet (1,000 mg total) by mouth 2 (two) times a day. Take one tablet by mouth twice daily. 180 tablet 1    naltrexone-bupropion (CONTRAVE) 8-90 mg TbSR Take by mouth.      pantoprazole (PROTONIX) 40 MG tablet Take 1 tablet (40 mg total) by mouth 2 (two) times daily. (Patient taking differently: Take 40 mg by mouth once daily.) 60 tablet 11    potassium chloride (KLOR-CON) 10 MEQ TbSR Take 10 mEq by mouth once daily.      rosuvastatin (CRESTOR) 5 MG tablet Take 1 tablet (5 mg total) by mouth after dinner. 90 tablet 0    semaglutide (OZEMPIC) 0.25 mg or 0.5 mg (2 mg/3 mL) pen injector Inject 0.5 mg into the skin every 7 days. 3 mL 2    traZODone (DESYREL) 50 MG tablet Take 50 mg by mouth every evening.      zolpidem (AMBIEN) 10 mg Tab Take 5 mg by mouth nightly as needed.      ibuprofen-famotidine (DUEXIS) 800-26.6 mg Tab Take 1 tablet by mouth once daily.      meloxicam (MOBIC) 15 MG tablet Take 15 mg by mouth once daily.      methocarbamoL (ROBAXIN) 750 MG Tab Take 1-2 tablets (750-1,500 mg total) by mouth 4 (four) times daily as needed (muscle spasms). 60 tablet 2     No current facility-administered medications for this visit.     Past Medical History:   Diagnosis Date    Arthritis     Back pain     Balance problems     Cervical spondylosis with radiculopathy     Claudication     Depression     Diabetes mellitus     Dizziness     Dorsalgia     Dyslipidemia     Dysphagia  04/2022    GERD (gastroesophageal reflux disease)     Headache     History of chest pain     History of kidney stones     Hypertension     Hypothyroidism     Insomnia     Irregular heart beat     Obesity     Palpitations     Right hip pain     Right leg pain     Right leg weakness     Shoulder pain     Sleep apnea     uses CPAP    SOB (shortness of breath)     Spinal stenosis of lumbar region, unspecified whether neurogenic claudication present     Wears dentures     FULL     Past Surgical History:   Procedure Laterality Date    APPENDECTOMY      BACK SURGERY  2000    L4-5 and L5-S1 ALIF with pedicle screws.    CHOLECYSTOTOMY      COLONOSCOPY      DECOMPRESSION OF LUMBAR SPINE USING MINIMALLY INVASIVE TECHNIQUE Bilateral 06/13/2023    L2-3, L3-4 decompression.  Dr. Barclay    FORAMINOTOMY N/A 9/14/2023    Procedure: FORAMINOTOMY, SPINE;  Surgeon: Renny Barclay MD;  Location: [facility] OR;  Service: Neurosurgery;  Laterality: N/A;  C3-7 laminoplasty, then anterior removal of C2-7 osteophytes (posterior then flip and remove anterior osteophytes)    LAMINOPLASTY N/A 9/14/2023    Procedure: LAMINOPLASTY;  Surgeon: Renny Barclay MD;  Location: [facility] OR;  Service: Neurosurgery;  Laterality: N/A;  C3-7 laminoplasty, then anterior removal of C2-7 osteophytes (posterior then flip and remove anterior osteophytes)  NTI  Owen w/ Adriana  //  XX    TONSILLECTOMY      TOTAL KNEE ARTHROPLASTY Bilateral     VASECTOMY       Family History       Problem Relation (Age of Onset)    Cancer Sister, Sister    Cervical cancer Mother    Colon cancer Father    Heart attack Father (82)    Heart disease Mother (76)    No Known Problems Brother, Brother, Brother, Brother, Brother          Social History     Socioeconomic History    Marital status:     Number of children: 4   Tobacco Use    Smoking status: Never    Smokeless tobacco: Never   Substance and Sexual Activity    Alcohol use: Yes     Comment: rarely    Drug use: Never  "        Review of systems:    Constitutional:  Positive for activity change. Negative for chills and fever.   HENT:  Positive for trouble swallowing. Negative for nosebleeds and sore throat.    Eyes:  Negative for pain and visual disturbance.   Respiratory:  Positive for shortness of breath. Negative for cough and chest tightness.    Cardiovascular:  Negative for chest pain.   Gastrointestinal:  Negative for diarrhea, nausea and vomiting.   Genitourinary:  Negative for difficulty urinating, dysuria and hematuria.   Musculoskeletal:  Positive for back pain, gait problem, neck pain and neck stiffness. Negative for myalgias.   Skin:  Negative for rash.   Neurological:  Positive for weakness, numbness and headaches. Negative for dizziness and facial asymmetry.   Psychiatric/Behavioral:  Negative for confusion and sleep disturbance. The patient is not nervous/anxious.           OBJECTIVE:       Vital Signs  Temp: 98.3 °F (36.8 °C)  Pulse: 86  Resp: 16  BP: 117/80  Pain Score:   5  Height: 6' 1" (185.4 cm)  Weight: 131.1 kg (289 lb)  Body mass index is 38.13 kg/m².    Physical Exam:    General:  Pleasant. Well-nourished. Alert. No acute distress.    Head:  Normocephalic, without obvious abnormality, atraumatic    Lungs:   Breathing is quiet, non-lablored    Neurological:    Oriented to Person, Place, Time   Speech:  normal  Memory, cognition, and affect are appropriate.  Motor Strength: Moves all extremities spontaneously with good tone.  No abnormal movements seen.      Cervical incision:  C/D/I  Wound edges well-approximated  healing well      Gait:  normal    Most recent xrays show stable alignment with satisfactory position of the hardware.       ASSESSMENT/PLAN:       1. Cervical spondylosis with myelopathy and radiculopathy  - Ambulatory referral/consult to Neurology; Future  - methocarbamoL (ROBAXIN) 750 MG Tab; Take 1-2 tablets (750-1,500 mg total) by mouth 4 (four) times daily as needed (muscle spasms).  " Dispense: 60 tablet; Refill: 2  - Ambulatory referral/consult to Physical/Occupational Therapy; Future    2. Bilateral carpal tunnel syndrome  - WRIST BRACE FOR HOME USE  - Ambulatory referral/consult to Neurology; Future    3. Spinal stenosis of lumbar region with neurogenic claudication    4. Spinal stenosis, cervical region    5. Post-operative state  - methocarbamoL (ROBAXIN) 750 MG Tab; Take 1-2 tablets (750-1,500 mg total) by mouth 4 (four) times daily as needed (muscle spasms).  Dispense: 60 tablet; Refill: 2  - Ambulatory referral/consult to Physical/Occupational Therapy; Future  - Send results of EMG's to Dr. Granados  - Follow up 3 months post op         I, Dr. Renny Barclay, personally performed the services described in this documentation. All medical record entries made by the scribe, Marguerite Nascimento RN, were at my direction and in my presence.  I have reviewed the chart and agree that the record reflects my personal performance and is accurate and complete. Renny Barclay MD.  8:54 AM 09/27/2023         Renny Barclay MD [facility]            ,DirectAddress_Unknown

## 2024-02-02 PROBLEM — G47.33 OSA ON CPAP: Chronic | Status: ACTIVE | Noted: 2023-01-05

## 2024-02-02 PROBLEM — E11.9 TYPE 2 DIABETES MELLITUS WITHOUT COMPLICATION, WITHOUT LONG-TERM CURRENT USE OF INSULIN: Status: ACTIVE | Noted: 2024-02-02

## 2024-02-02 PROBLEM — G99.2 MYELOPATHY IN DISEASES CLASSIFIED ELSEWHERE: Status: ACTIVE | Noted: 2024-02-02

## 2024-02-02 PROBLEM — G99.2 MYELOPATHY IN DISEASES CLASSIFIED ELSEWHERE: Chronic | Status: ACTIVE | Noted: 2024-02-02

## 2024-02-02 PROBLEM — E66.01 MORBID (SEVERE) OBESITY DUE TO EXCESS CALORIES: Status: ACTIVE | Noted: 2024-02-02

## 2024-02-02 PROBLEM — G47.33 OSA ON CPAP: Status: ACTIVE | Noted: 2023-01-05

## 2024-02-02 PROBLEM — M54.16 LUMBAR RADICULOPATHY: Status: RESOLVED | Noted: 2023-06-13 | Resolved: 2024-02-02

## 2024-02-02 PROBLEM — E11.9 TYPE 2 DIABETES MELLITUS WITHOUT COMPLICATION, WITHOUT LONG-TERM CURRENT USE OF INSULIN: Chronic | Status: ACTIVE | Noted: 2024-02-02

## 2024-02-24 ENCOUNTER — HOSPITAL ENCOUNTER (OUTPATIENT)
Facility: HOSPITAL | Age: 64
Discharge: HOME | End: 2024-02-25
Attending: EMERGENCY MEDICINE | Admitting: ORTHOPAEDIC SURGERY
Payer: COMMERCIAL

## 2024-02-24 ENCOUNTER — ANESTHESIA EVENT (OUTPATIENT)
Dept: SURGERY | Facility: HOSPITAL | Age: 64
End: 2024-02-24
Payer: COMMERCIAL

## 2024-02-24 ENCOUNTER — ANESTHESIA (OUTPATIENT)
Dept: SURGERY | Facility: HOSPITAL | Age: 64
End: 2024-02-24
Payer: COMMERCIAL

## 2024-02-24 DIAGNOSIS — T81.31XA POSTOPERATIVE WOUND DEHISCENCE, INITIAL ENCOUNTER: ICD-10-CM

## 2024-02-24 DIAGNOSIS — M47.22 CERVICAL SPONDYLOSIS WITH RADICULOPATHY: ICD-10-CM

## 2024-02-24 DIAGNOSIS — T81.30XA WOUND DEHISCENCE: Primary | ICD-10-CM

## 2024-02-24 DIAGNOSIS — M48.062 SPINAL STENOSIS OF LUMBAR REGION WITH NEUROGENIC CLAUDICATION: ICD-10-CM

## 2024-02-24 DIAGNOSIS — Z01.818 PREOPERATIVE TESTING: ICD-10-CM

## 2024-02-24 DIAGNOSIS — Z51.89 VISIT FOR WOUND CHECK: ICD-10-CM

## 2024-02-24 DIAGNOSIS — Z48.89 ENCOUNTER FOR POST SURGICAL WOUND CHECK: ICD-10-CM

## 2024-02-24 LAB
ABS NEUT (OLG): 11.77 X10(3)/MCL (ref 2.1–9.2)
ALBUMIN SERPL-MCNC: 3.5 G/DL (ref 3.4–4.8)
ALBUMIN/GLOB SERPL: 1 RATIO (ref 1.1–2)
ALP SERPL-CCNC: 84 UNIT/L (ref 40–150)
ALT SERPL-CCNC: 13 UNIT/L (ref 0–55)
AST SERPL-CCNC: 21 UNIT/L (ref 5–34)
BASOPHILS NFR BLD MANUAL: 0.17 X10(3)/MCL (ref 0–0.2)
BASOPHILS NFR BLD MANUAL: 1 %
BILIRUB SERPL-MCNC: 0.5 MG/DL
BUN SERPL-MCNC: 11 MG/DL (ref 8.4–25.7)
CALCIUM SERPL-MCNC: 9.2 MG/DL (ref 8.8–10)
CHLORIDE SERPL-SCNC: 108 MMOL/L (ref 98–107)
CO2 SERPL-SCNC: 24 MMOL/L (ref 23–31)
CREAT SERPL-MCNC: 0.79 MG/DL (ref 0.73–1.18)
EOSINOPHIL NFR BLD MANUAL: 0.83 X10(3)/MCL (ref 0–0.9)
EOSINOPHIL NFR BLD MANUAL: 5 %
ERYTHROCYTE [DISTWIDTH] IN BLOOD BY AUTOMATED COUNT: 15.1 % (ref 11.5–17)
GFR SERPLBLD CREATININE-BSD FMLA CKD-EPI: >60 MLS/MIN/1.73/M2
GLOBULIN SER-MCNC: 3.6 GM/DL (ref 2.4–3.5)
GLUCOSE SERPL-MCNC: 62 MG/DL (ref 82–115)
HCT VFR BLD AUTO: 37.6 % (ref 42–52)
HGB BLD-MCNC: 12.2 G/DL (ref 14–18)
INSTRUMENT WBC (OLG): 16.58 X10(3)/MCL
LYMPHOCYTES NFR BLD MANUAL: 19 %
LYMPHOCYTES NFR BLD MANUAL: 3.15 X10(3)/MCL
MCH RBC QN AUTO: 29.3 PG (ref 27–31)
MCHC RBC AUTO-ENTMCNC: 32.4 G/DL (ref 33–36)
MCV RBC AUTO: 90.2 FL (ref 80–94)
MONOCYTES NFR BLD MANUAL: 0.83 X10(3)/MCL (ref 0.1–1.3)
MONOCYTES NFR BLD MANUAL: 5 %
NEUTROPHILS NFR BLD MANUAL: 71 %
NRBC BLD AUTO-RTO: 0 %
PLATELET # BLD AUTO: 366 X10(3)/MCL (ref 130–400)
PLATELET # BLD EST: ADEQUATE 10*3/UL
PMV BLD AUTO: 9.2 FL (ref 7.4–10.4)
POCT GLUCOSE: 99 MG/DL (ref 70–110)
POTASSIUM SERPL-SCNC: 3.9 MMOL/L (ref 3.5–5.1)
PROT SERPL-MCNC: 7.1 GM/DL (ref 5.8–7.6)
RBC # BLD AUTO: 4.17 X10(6)/MCL (ref 4.7–6.1)
RBC MORPH BLD: NORMAL
SODIUM SERPL-SCNC: 142 MMOL/L (ref 136–145)
WBC # SPEC AUTO: 16.58 X10(3)/MCL (ref 4.5–11.5)

## 2024-02-24 PROCEDURE — 63600175 PHARM REV CODE 636 W HCPCS: Performed by: ORTHOPAEDIC SURGERY

## 2024-02-24 PROCEDURE — 96365 THER/PROPH/DIAG IV INF INIT: CPT

## 2024-02-24 PROCEDURE — 27201423 OPTIME MED/SURG SUP & DEVICES STERILE SUPPLY: Performed by: ORTHOPAEDIC SURGERY

## 2024-02-24 PROCEDURE — 93005 ELECTROCARDIOGRAM TRACING: CPT | Mod: 59

## 2024-02-24 PROCEDURE — 80053 COMPREHEN METABOLIC PANEL: CPT | Performed by: NURSE PRACTITIONER

## 2024-02-24 PROCEDURE — 71000033 HC RECOVERY, INTIAL HOUR: Performed by: ORTHOPAEDIC SURGERY

## 2024-02-24 PROCEDURE — 96375 TX/PRO/DX INJ NEW DRUG ADDON: CPT | Mod: 59

## 2024-02-24 PROCEDURE — 37000008 HC ANESTHESIA 1ST 15 MINUTES: Performed by: ORTHOPAEDIC SURGERY

## 2024-02-24 PROCEDURE — G0378 HOSPITAL OBSERVATION PER HR: HCPCS

## 2024-02-24 PROCEDURE — 25000003 PHARM REV CODE 250: Performed by: EMERGENCY MEDICINE

## 2024-02-24 PROCEDURE — 36000705 HC OR TIME LEV I EA ADD 15 MIN: Performed by: ORTHOPAEDIC SURGERY

## 2024-02-24 PROCEDURE — 83036 HEMOGLOBIN GLYCOSYLATED A1C: CPT | Performed by: ORTHOPAEDIC SURGERY

## 2024-02-24 PROCEDURE — 85027 COMPLETE CBC AUTOMATED: CPT

## 2024-02-24 PROCEDURE — 63600175 PHARM REV CODE 636 W HCPCS: Performed by: NURSE ANESTHETIST, CERTIFIED REGISTERED

## 2024-02-24 PROCEDURE — 93010 ELECTROCARDIOGRAM REPORT: CPT | Mod: ,,, | Performed by: INTERNAL MEDICINE

## 2024-02-24 PROCEDURE — 37000009 HC ANESTHESIA EA ADD 15 MINS: Performed by: ORTHOPAEDIC SURGERY

## 2024-02-24 PROCEDURE — 82962 GLUCOSE BLOOD TEST: CPT

## 2024-02-24 PROCEDURE — 99285 EMERGENCY DEPT VISIT HI MDM: CPT | Mod: 25

## 2024-02-24 PROCEDURE — 36000704 HC OR TIME LEV I 1ST 15 MIN: Performed by: ORTHOPAEDIC SURGERY

## 2024-02-24 PROCEDURE — 13160 SEC CLSR SURG WND/DEHSN XTN: CPT | Mod: ,,, | Performed by: ORTHOPAEDIC SURGERY

## 2024-02-24 PROCEDURE — 25000003 PHARM REV CODE 250: Performed by: NURSE ANESTHETIST, CERTIFIED REGISTERED

## 2024-02-24 PROCEDURE — D9220A PRA ANESTHESIA: Mod: CRNA,,, | Performed by: NURSE ANESTHETIST, CERTIFIED REGISTERED

## 2024-02-24 PROCEDURE — D9220A PRA ANESTHESIA: Mod: ANES,,, | Performed by: ANESTHESIOLOGY

## 2024-02-24 RX ORDER — IBUPROFEN 200 MG
24 TABLET ORAL
Status: DISCONTINUED | OUTPATIENT
Start: 2024-02-25 | End: 2024-02-25 | Stop reason: HOSPADM

## 2024-02-24 RX ORDER — HYDROCHLOROTHIAZIDE 25 MG/1
25 TABLET ORAL DAILY
Status: DISCONTINUED | OUTPATIENT
Start: 2024-02-25 | End: 2024-02-25 | Stop reason: HOSPADM

## 2024-02-24 RX ORDER — ONDANSETRON HYDROCHLORIDE 2 MG/ML
INJECTION, SOLUTION INTRAVENOUS
Status: DISCONTINUED | OUTPATIENT
Start: 2024-02-24 | End: 2024-02-25

## 2024-02-24 RX ORDER — LISINOPRIL AND HYDROCHLOROTHIAZIDE 20; 25 MG/1; MG/1
1 TABLET ORAL DAILY
Status: DISCONTINUED | OUTPATIENT
Start: 2024-02-25 | End: 2024-02-24

## 2024-02-24 RX ORDER — MIDAZOLAM HYDROCHLORIDE 1 MG/ML
INJECTION INTRAMUSCULAR; INTRAVENOUS
Status: DISCONTINUED | OUTPATIENT
Start: 2024-02-24 | End: 2024-02-25

## 2024-02-24 RX ORDER — PROCHLORPERAZINE EDISYLATE 5 MG/ML
5 INJECTION INTRAMUSCULAR; INTRAVENOUS EVERY 30 MIN PRN
Status: DISCONTINUED | OUTPATIENT
Start: 2024-02-24 | End: 2024-02-25 | Stop reason: HOSPADM

## 2024-02-24 RX ORDER — ACETAMINOPHEN 325 MG/1
650 TABLET ORAL EVERY 8 HOURS PRN
Status: DISCONTINUED | OUTPATIENT
Start: 2024-02-25 | End: 2024-02-25 | Stop reason: HOSPADM

## 2024-02-24 RX ORDER — OXYCODONE HYDROCHLORIDE 10 MG/1
10 TABLET ORAL EVERY 4 HOURS PRN
Status: DISCONTINUED | OUTPATIENT
Start: 2024-02-25 | End: 2024-02-24

## 2024-02-24 RX ORDER — OXYCODONE HYDROCHLORIDE 10 MG/1
10 TABLET ORAL EVERY 4 HOURS PRN
Status: DISCONTINUED | OUTPATIENT
Start: 2024-02-25 | End: 2024-02-25 | Stop reason: HOSPADM

## 2024-02-24 RX ORDER — SODIUM CHLORIDE 0.9 % (FLUSH) 0.9 %
10 SYRINGE (ML) INJECTION
Status: DISCONTINUED | OUTPATIENT
Start: 2024-02-25 | End: 2024-02-25 | Stop reason: HOSPADM

## 2024-02-24 RX ORDER — FENTANYL CITRATE 50 UG/ML
INJECTION, SOLUTION INTRAMUSCULAR; INTRAVENOUS
Status: DISCONTINUED | OUTPATIENT
Start: 2024-02-24 | End: 2024-02-25

## 2024-02-24 RX ORDER — CEFAZOLIN SODIUM 2 G/50ML
2 SOLUTION INTRAVENOUS
Status: DISCONTINUED | OUTPATIENT
Start: 2024-02-25 | End: 2024-02-25 | Stop reason: HOSPADM

## 2024-02-24 RX ORDER — IBUPROFEN 200 MG
16 TABLET ORAL
Status: DISCONTINUED | OUTPATIENT
Start: 2024-02-25 | End: 2024-02-25 | Stop reason: HOSPADM

## 2024-02-24 RX ORDER — HYDROMORPHONE HYDROCHLORIDE 2 MG/ML
0.4 INJECTION, SOLUTION INTRAMUSCULAR; INTRAVENOUS; SUBCUTANEOUS EVERY 5 MIN PRN
Status: DISCONTINUED | OUTPATIENT
Start: 2024-02-24 | End: 2024-02-25 | Stop reason: HOSPADM

## 2024-02-24 RX ORDER — CEFAZOLIN SODIUM 2 G/50ML
2 SOLUTION INTRAVENOUS
Status: CANCELLED | OUTPATIENT
Start: 2024-02-24

## 2024-02-24 RX ORDER — PROPOFOL 10 MG/ML
INJECTION, EMULSION INTRAVENOUS
Status: DISCONTINUED | OUTPATIENT
Start: 2024-02-24 | End: 2024-02-25

## 2024-02-24 RX ORDER — LIDOCAINE HYDROCHLORIDE 20 MG/ML
INJECTION, SOLUTION EPIDURAL; INFILTRATION; INTRACAUDAL; PERINEURAL
Status: DISCONTINUED | OUTPATIENT
Start: 2024-02-24 | End: 2024-02-25

## 2024-02-24 RX ORDER — MIDAZOLAM HYDROCHLORIDE 1 MG/ML
2 INJECTION INTRAMUSCULAR; INTRAVENOUS ONCE AS NEEDED
Status: CANCELLED | OUTPATIENT
Start: 2024-02-24 | End: 2035-07-23

## 2024-02-24 RX ORDER — TRAZODONE HYDROCHLORIDE 50 MG/1
50 TABLET ORAL NIGHTLY
Status: DISCONTINUED | OUTPATIENT
Start: 2024-02-25 | End: 2024-02-25 | Stop reason: HOSPADM

## 2024-02-24 RX ORDER — DEXAMETHASONE SODIUM PHOSPHATE 4 MG/ML
INJECTION, SOLUTION INTRA-ARTICULAR; INTRALESIONAL; INTRAMUSCULAR; INTRAVENOUS; SOFT TISSUE
Status: DISCONTINUED | OUTPATIENT
Start: 2024-02-24 | End: 2024-02-25

## 2024-02-24 RX ORDER — TALC
6 POWDER (GRAM) TOPICAL NIGHTLY PRN
Status: DISCONTINUED | OUTPATIENT
Start: 2024-02-25 | End: 2024-02-25 | Stop reason: HOSPADM

## 2024-02-24 RX ORDER — LIDOCAINE HYDROCHLORIDE 10 MG/ML
1 INJECTION INFILTRATION; PERINEURAL ONCE AS NEEDED
Status: DISCONTINUED | OUTPATIENT
Start: 2024-02-25 | End: 2024-02-25 | Stop reason: HOSPADM

## 2024-02-24 RX ORDER — SODIUM CHLORIDE, SODIUM GLUCONATE, SODIUM ACETATE, POTASSIUM CHLORIDE AND MAGNESIUM CHLORIDE 30; 37; 368; 526; 502 MG/100ML; MG/100ML; MG/100ML; MG/100ML; MG/100ML
INJECTION, SOLUTION INTRAVENOUS CONTINUOUS
Status: CANCELLED | OUTPATIENT
Start: 2024-02-25 | End: 2024-03-25

## 2024-02-24 RX ORDER — ONDANSETRON HYDROCHLORIDE 2 MG/ML
4 INJECTION, SOLUTION INTRAVENOUS DAILY PRN
Status: DISCONTINUED | OUTPATIENT
Start: 2024-02-24 | End: 2024-02-25 | Stop reason: HOSPADM

## 2024-02-24 RX ORDER — AMLODIPINE BESYLATE 5 MG/1
10 TABLET ORAL DAILY
Status: DISCONTINUED | OUTPATIENT
Start: 2024-02-25 | End: 2024-02-25 | Stop reason: HOSPADM

## 2024-02-24 RX ORDER — LIDOCAINE HYDROCHLORIDE 10 MG/ML
1 INJECTION, SOLUTION EPIDURAL; INFILTRATION; INTRACAUDAL; PERINEURAL ONCE
Status: CANCELLED | OUTPATIENT
Start: 2024-02-25 | End: 2024-02-25

## 2024-02-24 RX ORDER — GLUCAGON 1 MG
1 KIT INJECTION
Status: DISCONTINUED | OUTPATIENT
Start: 2024-02-25 | End: 2024-02-25 | Stop reason: HOSPADM

## 2024-02-24 RX ORDER — ONDANSETRON 4 MG/1
8 TABLET, ORALLY DISINTEGRATING ORAL EVERY 8 HOURS PRN
Status: DISCONTINUED | OUTPATIENT
Start: 2024-02-25 | End: 2024-02-25 | Stop reason: HOSPADM

## 2024-02-24 RX ORDER — EPHEDRINE SULFATE 50 MG/ML
INJECTION, SOLUTION INTRAVENOUS
Status: DISCONTINUED | OUTPATIENT
Start: 2024-02-24 | End: 2024-02-25

## 2024-02-24 RX ORDER — PHENYLEPHRINE HCL IN 0.9% NACL 1 MG/10 ML
SYRINGE (ML) INTRAVENOUS
Status: DISCONTINUED | OUTPATIENT
Start: 2024-02-24 | End: 2024-02-25

## 2024-02-24 RX ORDER — HYDROMORPHONE HYDROCHLORIDE 2 MG/ML
0.2 INJECTION, SOLUTION INTRAMUSCULAR; INTRAVENOUS; SUBCUTANEOUS EVERY 5 MIN PRN
Status: DISCONTINUED | OUTPATIENT
Start: 2024-02-24 | End: 2024-02-25 | Stop reason: HOSPADM

## 2024-02-24 RX ORDER — ONDANSETRON 4 MG/1
8 TABLET, ORALLY DISINTEGRATING ORAL EVERY 6 HOURS PRN
Status: CANCELLED | OUTPATIENT
Start: 2024-02-24

## 2024-02-24 RX ORDER — LISINOPRIL 20 MG/1
20 TABLET ORAL DAILY
Status: DISCONTINUED | OUTPATIENT
Start: 2024-02-25 | End: 2024-02-25 | Stop reason: HOSPADM

## 2024-02-24 RX ORDER — METFORMIN HYDROCHLORIDE 500 MG/1
1000 TABLET ORAL 2 TIMES DAILY
Status: DISCONTINUED | OUTPATIENT
Start: 2024-02-24 | End: 2024-02-25 | Stop reason: HOSPADM

## 2024-02-24 RX ORDER — PANTOPRAZOLE SODIUM 40 MG/1
40 TABLET, DELAYED RELEASE ORAL DAILY
Status: DISCONTINUED | OUTPATIENT
Start: 2024-02-25 | End: 2024-02-25 | Stop reason: HOSPADM

## 2024-02-24 RX ORDER — CEFAZOLIN SODIUM 2 G/50ML
2 SOLUTION INTRAVENOUS
Status: DISCONTINUED | OUTPATIENT
Start: 2024-02-24 | End: 2024-02-25 | Stop reason: HOSPADM

## 2024-02-24 RX ORDER — IPRATROPIUM BROMIDE AND ALBUTEROL SULFATE 2.5; .5 MG/3ML; MG/3ML
3 SOLUTION RESPIRATORY (INHALATION)
Status: DISCONTINUED | OUTPATIENT
Start: 2024-02-24 | End: 2024-02-25 | Stop reason: HOSPADM

## 2024-02-24 RX ORDER — MORPHINE SULFATE 4 MG/ML
4 INJECTION, SOLUTION INTRAMUSCULAR; INTRAVENOUS EVERY 4 HOURS PRN
Status: DISCONTINUED | OUTPATIENT
Start: 2024-02-25 | End: 2024-02-25 | Stop reason: HOSPADM

## 2024-02-24 RX ORDER — GLYCOPYRROLATE 0.2 MG/ML
INJECTION INTRAMUSCULAR; INTRAVENOUS
Status: DISCONTINUED | OUTPATIENT
Start: 2024-02-24 | End: 2024-02-25

## 2024-02-24 RX ORDER — ENOXAPARIN SODIUM 100 MG/ML
40 INJECTION SUBCUTANEOUS EVERY 24 HOURS
Status: DISCONTINUED | OUTPATIENT
Start: 2024-02-25 | End: 2024-02-25 | Stop reason: HOSPADM

## 2024-02-24 RX ORDER — MEPERIDINE HYDROCHLORIDE 25 MG/ML
12.5 INJECTION INTRAMUSCULAR; INTRAVENOUS; SUBCUTANEOUS EVERY 10 MIN PRN
Status: DISCONTINUED | OUTPATIENT
Start: 2024-02-24 | End: 2024-02-25 | Stop reason: HOSPADM

## 2024-02-24 RX ADMIN — LIDOCAINE HYDROCHLORIDE 50 MG: 20 INJECTION, SOLUTION EPIDURAL; INFILTRATION; INTRACAUDAL; PERINEURAL at 11:02

## 2024-02-24 RX ADMIN — SODIUM CHLORIDE, SODIUM GLUCONATE, SODIUM ACETATE, POTASSIUM CHLORIDE AND MAGNESIUM CHLORIDE: 526; 502; 368; 37; 30 INJECTION, SOLUTION INTRAVENOUS at 11:02

## 2024-02-24 RX ADMIN — EPHEDRINE SULFATE 10 MG: 50 INJECTION INTRAVENOUS at 11:02

## 2024-02-24 RX ADMIN — MIDAZOLAM HYDROCHLORIDE 2 MG: 1 INJECTION, SOLUTION INTRAMUSCULAR; INTRAVENOUS at 11:02

## 2024-02-24 RX ADMIN — DEXTROSE MONOHYDRATE 250 ML: 100 INJECTION, SOLUTION INTRAVENOUS at 11:02

## 2024-02-24 RX ADMIN — PROPOFOL 200 MG: 10 INJECTION, EMULSION INTRAVENOUS at 11:02

## 2024-02-24 RX ADMIN — CEFAZOLIN SODIUM 2 G: 2 SOLUTION INTRAVENOUS at 09:02

## 2024-02-24 RX ADMIN — Medication 200 MCG: at 11:02

## 2024-02-24 RX ADMIN — GLYCOPYRROLATE 0.1 MG: 0.2 INJECTION INTRAMUSCULAR; INTRAVENOUS at 11:02

## 2024-02-24 RX ADMIN — ONDANSETRON 4 MG: 2 INJECTION INTRAMUSCULAR; INTRAVENOUS at 11:02

## 2024-02-24 RX ADMIN — DEXAMETHASONE SODIUM PHOSPHATE 4 MG: 4 INJECTION, SOLUTION INTRA-ARTICULAR; INTRALESIONAL; INTRAMUSCULAR; INTRAVENOUS; SOFT TISSUE at 11:02

## 2024-02-24 RX ADMIN — FENTANYL CITRATE 50 MCG: 50 INJECTION, SOLUTION INTRAMUSCULAR; INTRAVENOUS at 11:02

## 2024-02-25 VITALS
SYSTOLIC BLOOD PRESSURE: 106 MMHG | BODY MASS INDEX: 36.31 KG/M2 | DIASTOLIC BLOOD PRESSURE: 63 MMHG | HEIGHT: 73 IN | OXYGEN SATURATION: 98 % | WEIGHT: 274 LBS | TEMPERATURE: 99 F | HEART RATE: 73 BPM | RESPIRATION RATE: 18 BRPM

## 2024-02-25 LAB
EST. AVERAGE GLUCOSE BLD GHB EST-MCNC: 125.5 MG/DL
HBA1C MFR BLD: 6 %
OHS QRS DURATION: 94 MS
OHS QRS DURATION: 96 MS
OHS QTC CALCULATION: 441 MS
OHS QTC CALCULATION: 449 MS
POCT GLUCOSE: 244 MG/DL (ref 70–110)
POCT GLUCOSE: 264 MG/DL (ref 70–110)
POCT GLUCOSE: 302 MG/DL (ref 70–110)
POCT GLUCOSE: 83 MG/DL (ref 70–110)

## 2024-02-25 PROCEDURE — 63600175 PHARM REV CODE 636 W HCPCS: Performed by: ANESTHESIOLOGY

## 2024-02-25 PROCEDURE — 25000003 PHARM REV CODE 250: Performed by: ORTHOPAEDIC SURGERY

## 2024-02-25 PROCEDURE — 99204 OFFICE O/P NEW MOD 45 MIN: CPT | Mod: 57,,, | Performed by: ORTHOPAEDIC SURGERY

## 2024-02-25 PROCEDURE — 63600175 PHARM REV CODE 636 W HCPCS: Performed by: ORTHOPAEDIC SURGERY

## 2024-02-25 PROCEDURE — 25000003 PHARM REV CODE 250: Performed by: NURSE ANESTHETIST, CERTIFIED REGISTERED

## 2024-02-25 PROCEDURE — 96366 THER/PROPH/DIAG IV INF ADDON: CPT

## 2024-02-25 PROCEDURE — G0378 HOSPITAL OBSERVATION PER HR: HCPCS

## 2024-02-25 RX ORDER — CEPHALEXIN 500 MG/1
500 CAPSULE ORAL 4 TIMES DAILY
Qty: 56 CAPSULE | Refills: 0 | Status: SHIPPED | OUTPATIENT
Start: 2024-02-25 | End: 2024-03-10

## 2024-02-25 RX ORDER — VANCOMYCIN HYDROCHLORIDE 1 G/20ML
INJECTION, POWDER, LYOPHILIZED, FOR SOLUTION INTRAVENOUS
Status: DISCONTINUED | OUTPATIENT
Start: 2024-02-25 | End: 2024-02-25 | Stop reason: HOSPADM

## 2024-02-25 RX ORDER — OXYCODONE AND ACETAMINOPHEN 10; 325 MG/1; MG/1
1 TABLET ORAL EVERY 6 HOURS PRN
Qty: 28 TABLET | Refills: 0 | Status: SHIPPED | OUTPATIENT
Start: 2024-02-25 | End: 2024-03-03

## 2024-02-25 RX ADMIN — HYDROMORPHONE HYDROCHLORIDE 0.2 MG: 2 INJECTION INTRAMUSCULAR; INTRAVENOUS; SUBCUTANEOUS at 12:02

## 2024-02-25 RX ADMIN — AMLODIPINE BESYLATE 10 MG: 5 TABLET ORAL at 09:02

## 2024-02-25 RX ADMIN — OXYCODONE HYDROCHLORIDE 10 MG: 10 TABLET ORAL at 10:02

## 2024-02-25 RX ADMIN — EPHEDRINE SULFATE 10 MG: 50 INJECTION INTRAVENOUS at 12:02

## 2024-02-25 RX ADMIN — OXYCODONE HYDROCHLORIDE 10 MG: 10 TABLET ORAL at 05:02

## 2024-02-25 RX ADMIN — HYDROCHLOROTHIAZIDE 25 MG: 25 TABLET ORAL at 09:02

## 2024-02-25 RX ADMIN — LISINOPRIL 20 MG: 20 TABLET ORAL at 09:02

## 2024-02-25 RX ADMIN — OXYCODONE HYDROCHLORIDE 10 MG: 10 TABLET ORAL at 01:02

## 2024-02-25 RX ADMIN — METFORMIN HYDROCHLORIDE 1000 MG: 500 TABLET, FILM COATED ORAL at 09:02

## 2024-02-25 RX ADMIN — CEFAZOLIN SODIUM 2 G: 2 SOLUTION INTRAVENOUS at 05:02

## 2024-02-25 RX ADMIN — PANTOPRAZOLE SODIUM 40 MG: 40 TABLET, DELAYED RELEASE ORAL at 09:02

## 2024-02-25 NOTE — DISCHARGE SUMMARY
Discharge Summary    Admit Date: 2/24/2024     Discharge Date: 2/25/2024     Operative Procedure: INCISION AND DRAINAGE, LOWER EXTREMITY (Right: Hip)     History of Present Illness/Hospital Course:  Patient presented to the ED yesterday with a right hip surgical wound dehiscence following total hip arthroplasty approximately 4 weeks ago.  He underwent I and D of the right hip yesterday with closure of the surgical wound.  He had no complications since surgery.  He is on Ancef for 24 hours postop.  He is weight-bearing as tolerated range of motion as tolerated to the right hip.  He is eager for discharge home today.  We will send him out with oral antibiotics as well as multimodal pain control.  He will follow up with his previous arthroplastic surgeon at discharge for continued management.    Discharge Disposition: Home    Activity: WBAT to affected extremity ; ROMAT    Diet: Resume previous home diet    Medications:      Medication List        START taking these medications      cephALEXin 500 MG capsule  Commonly known as: KEFLEX  Take 1 capsule (500 mg total) by mouth 4 (four) times daily. for 14 days            CHANGE how you take these medications      * oxyCODONE-acetaminophen  mg per tablet  Commonly known as: PERCOCET  What changed: Another medication with the same name was added. Make sure you understand how and when to take each.     * oxyCODONE-acetaminophen  mg per tablet  Commonly known as: PERCOCET  Take 1 tablet by mouth every 6 (six) hours as needed for Pain.  What changed: You were already taking a medication with the same name, and this prescription was added. Make sure you understand how and when to take each.           * This list has 2 medication(s) that are the same as other medications prescribed for you. Read the directions carefully, and ask your doctor or other care provider to review them with you.                CONTINUE taking these medications      amLODIPine 10 MG  tablet  Commonly known as: NORVASC     cholecalciferol (vitamin D3) 25 mcg (1,000 unit) capsule  Commonly known as: VITAMIN D3     cyclobenzaprine 10 MG tablet  Commonly known as: FLEXERIL  Take 1 tablet (10 mg total) by mouth 3 (three) times daily as needed for Muscle spasms.     DUEXIS 800-26.6 mg Tab  Generic drug: ibuprofen-famotidine     FLUoxetine 20 MG capsule  Take 1 capsule (20 mg total) by mouth once daily.     furosemide 40 MG tablet  Commonly known as: LASIX     glimepiride 4 MG tablet  Commonly known as: AMARYL  Take 1 tablet (4 mg total) by mouth once daily. Take one tablet daily.     levothyroxine 100 MCG tablet  Commonly known as: SYNTHROID  1 tablet on an empty stomach in the morning     lisinopriL-hydrochlorothiazide 20-25 mg Tab  Commonly known as: PRINZIDE,ZESTORETIC  Take 1 tablet by mouth once daily.     meloxicam 15 MG tablet  Commonly known as: MOBIC     metFORMIN 1000 MG tablet  Commonly known as: GLUCOPHAGE  Take 1 tablet (1,000 mg total) by mouth 2 (two) times a day. Take one tablet by mouth twice daily.     pantoprazole 40 MG tablet  Commonly known as: PROTONIX  Take 1 tablet (40 mg total) by mouth once daily.     pioglitazone 15 MG tablet  Commonly known as: ACTOS     rosuvastatin 5 MG tablet  Commonly known as: CRESTOR  Take 1 tablet (5 mg total) by mouth after dinner.     traZODone 50 MG tablet  Commonly known as: DESYREL            STOP taking these medications      CONTRAVE 8-90 mg Tbsr  Generic drug: naltrexone-bupropion     semaglutide 0.25 mg or 0.5 mg (2 mg/3 mL) pen injector  Commonly known as: OZEMPIC     zolpidem 10 mg Tab  Commonly known as: AMBIEN               Where to Get Your Medications        These medications were sent to [facility] - [place], [place] - [address]  [address], [place] [address]      Phone: [phone]   cephALEXin 500 MG capsule  oxyCODONE-acetaminophen  mg per tablet          Discharge Instructions: If in  splint, keep clean and dry until follow up. Otherwise daily dry dressing changes until follow up. Keep incisions clean and dry. Do not apply ointments or creams.    Follow Up: Dr. Granados in 3 days for repeat wound check and post hospital follow up    The above findings, diagnostics, and treatment plan were discussed with Dr. Coleman who is in agreement with the plan of care except as stated in additional documentation.     FL[facility] Blount[facility]   Orthopedic Trauma

## 2024-02-25 NOTE — ANESTHESIA PROCEDURE NOTES
Intubation    Date/Time: 2/24/2024 11:25 PM    Performed by: Adam Tamez CRNA  Authorized by: Talon Morejon Jr., MD    Intubation:     Induction:  Intravenous    Intubated:  Postinduction    Mask Ventilation:  Easy with oral airway    Attempts:  1    Attempted By:  CRNA    Difficult Airway Encountered?: No      Complications:  None    Airway Device:  Supraglottic airway/LMA    Airway Device Size:  4.0    Style/Cuff Inflation:  Cuffed    Secured at:  The lips    Placement Verified By:  Capnometry    Complicating Factors:  None

## 2024-02-25 NOTE — ED PROVIDER NOTES
Encounter Date: 2/24/2024       History     Chief Complaint   Patient presents with    Wound Check     R hip replacement on 01/23 in [place] with Dr. Granados. Pt fell last night & landed on the hip, was seen at [facility] & [place]. Pts Daughter spoke with Dr. Coleman & they were told to come in to be evaluated.      Patient presents with:  Wound Check: R hip replacement on 01/23 in [place] with Dr. Granados. Pt fell last night & landed on the hip, was seen at [facility] & was DC home. Pts Daughter spoke with Dr. Coleman & they were told to come in to be evaluated.         The history is provided by the patient.     Review of patient's allergies indicates:   Allergen Reactions    Adhesive tape-silicones Other (See Comments)     welps and skin breakdown     Past Medical History:   Diagnosis Date    Arthritis     Back pain     Balance problems     Cervical spondylosis with radiculopathy     Claudication     Depression     Diabetes mellitus     Dizziness     Dorsalgia     Dyslipidemia     Dysphagia 04/2022    GERD (gastroesophageal reflux disease)     Headache     History of chest pain     History of kidney stones     Hypertension     Hypothyroidism     Insomnia     Irregular heart beat     Obesity     Palpitations     Right hip pain     Right leg pain     Right leg weakness     Shoulder pain     Sleep apnea     uses CPAP    SOB (shortness of breath)     Spinal stenosis of lumbar region, unspecified whether neurogenic claudication present     Wears dentures     FULL     Past Surgical History:   Procedure Laterality Date    APPENDECTOMY      BACK SURGERY  2000    L4-5 and L5-S1 ALIF with pedicle screws.    CHOLECYSTOTOMY      COLONOSCOPY      DECOMPRESSION OF LUMBAR SPINE USING MINIMALLY INVASIVE TECHNIQUE Bilateral 06/13/2023    L2-3, L3-4 decompression.  Dr. Barclay    FORAMINOTOMY N/A 9/14/2023    Procedure: FORAMINOTOMY, SPINE;  Surgeon: Renny Barclay MD;  Location: [facility];  Service: Neurosurgery;  Laterality: N/A;  C3-7  laminoplasty, then anterior removal of C2-7 osteophytes (posterior then flip and remove anterior osteophytes)    LAMINOPLASTY N/A 9/14/2023    Procedure: LAMINOPLASTY;  Surgeon: Renny Barclay MD;  Location: [facility] OR;  Service: Neurosurgery;  Laterality: N/A;  C3-7 laminoplasty, then anterior removal of C2-7 osteophytes (posterior then flip and remove anterior osteophytes)  NTI  Owen w/ Golvi  //  XX    TONSILLECTOMY      TOTAL KNEE ARTHROPLASTY Bilateral     VASECTOMY       Family History   Problem Relation Age of Onset    Cervical cancer Mother     Heart disease Mother 76    Heart attack Father 82    Colon cancer Father     Cancer Sister     No Known Problems Brother     Cancer Sister     No Known Problems Brother     No Known Problems Brother     No Known Problems Brother     No Known Problems Brother      Social History     Tobacco Use    Smoking status: Never    Smokeless tobacco: Never   Substance Use Topics    Alcohol use: Yes     Comment: rarely    Drug use: Never     Review of Systems   Constitutional:  Negative for fever.   HENT:  Negative for sore throat.    Respiratory:  Negative for shortness of breath.    Cardiovascular:  Negative for chest pain.   Gastrointestinal:  Negative for nausea.   Genitourinary:  Negative for dysuria.   Musculoskeletal:  Negative for back pain.        Right hip pain    Skin:  Negative for rash.   Neurological:  Negative for weakness.   Hematological:  Does not bruise/bleed easily.       Physical Exam     Initial Vitals [02/24/24 1933]   BP Pulse Resp Temp SpO2   124/65 72 19 98 °F (36.7 °C) 98 %      MAP       --         Physical Exam    Vitals reviewed.  Constitutional: He appears well-developed and well-nourished.   Cardiovascular:  Normal rate.           Pulmonary/Chest: Breath sounds normal.   Abdominal: Abdomen is soft.   Musculoskeletal:      Right hip: Laceration present. No bony tenderness. Normal strength.      Right foot: Normal range of motion and normal capillary  refill.        Legs:       Comments: Open wound      Neurological: He is alert and oriented to person, place, and time. He has normal strength.   Skin: Skin is warm.   Psychiatric: He has a normal mood and affect. His behavior is normal. Thought content normal.         ED Course   Procedures  Labs Reviewed   COMPREHENSIVE METABOLIC PANEL - Abnormal; Notable for the following components:       Result Value    Chloride 108 (*)     Glucose Level 62 (*)     Globulin 3.6 (*)     Albumin/Globulin Ratio 1.0 (*)     All other components within normal limits   CBC WITH DIFFERENTIAL - Abnormal; Notable for the following components:    WBC 16.58 (*)     RBC 4.17 (*)     Hgb 12.2 (*)     Hct 37.6 (*)     MCHC 32.4 (*)     All other components within normal limits   MANUAL DIFFERENTIAL - Abnormal; Notable for the following components:    Neutrophils Abs 11.7718 (*)     All other components within normal limits   CBC W/ AUTO DIFFERENTIAL    Narrative:     The following orders were created for panel order CBC Auto Differential.  Procedure                               Abnormality         Status                     ---------                               -----------         ------                     CBC with Differential[[id number]]       Abnormal            Final result               Manual Differential[[id number]]         Abnormal            Final result                 Please view results for these tests on the individual orders.   POCT GLUCOSE        ECG Results              EKG 12-lead (Final result)        Collection Time Result Time QRS Duration OHS QTC Calculation    02/24/24 21:59:03 02/25/24 09:35:55 96 449                     Final result by Interface, Lab In [facility] (02/25/24 09:36:03)                   Narrative:    Test Reason : Z51.89,    Vent. Rate : 071 BPM     Atrial Rate : 071 BPM     P-R Int : 152 ms          QRS Dur : 096 ms      QT Int : 414 ms       P-R-T Axes : 060 017 042 degrees     QTc Int : 449  ms    Normal sinus rhythm  Normal ECG  Confirmed by Jasvir Gómez MD ([id number]) on 2/25/2024 9:35:52 AM    Referred By:             Confirmed By:Jasvir Gómez MD                                     EKG 12-lead (Final result)        Collection Time Result Time QRS Duration OHS QTC Calculation    02/24/24 21:58:29 02/25/24 09:35:52 94 441                     Final result by Interface, Lab In [facility] (02/25/24 09:35:55)                   Narrative:    Test Reason : T81.30XA,Z51.89,    Vent. Rate : 069 BPM     Atrial Rate : 000 BPM     P-R Int : 000 ms          QRS Dur : 094 ms      QT Int : 412 ms       P-R-T Axes : 000 013 045 degrees     QTc Int : 441 ms    Undetermined rhythm  P wave is not clearly seen  Possibly NSR  Clinical Correlation Required  Borderline Abnormal ECG    Confirmed by Jasvir Gómez MD ([id number]) on 2/25/2024 9:35:46 AM    Referred By:             Confirmed By:Jasvir Gómez MD                                  Imaging Results              X-Ray Hip 2 or 3 views Right (with Pelvis when performed) (Final result)  Result time 02/24/24 20:54:44      Final result by Gaurang Carr MD (02/24/24 20:54:44)                   Impression:      Good position of right hip prosthesis      Electronically signed by: Gaurang Carr MD  Date:    02/24/2024  Time:    20:54               Narrative:    EXAMINATION:  XR HIP WITH PELVIS WHEN PERFORMED, 2 OR 3  VIEWS RIGHT    CLINICAL HISTORY:  Fall;    TECHNIQUE:  AP view of the pelvis and frog leg lateral view of the right hip were performed.    COMPARISON:  None    FINDINGS:  There is a prosthesis in the right hip.  There degenerative changes on the left.  There is hardware in the spine.  An acute fracture is not seen.                                       Medications   dextrose 10% bolus 250 mL 250 mL (0 mLs Intravenous Stopped 2/24/24 9229)     Medical Decision Making  Patient presents with:  Wound Check: R hip replacement on 01/23 in [place] with Dr. Granados. Lorenzo blanton  last night & landed on the hip, was seen at [facility] [place].   He is recent right total hip replacement with wound dehiscence.    Amount and/or Complexity of Data Reviewed  Labs:  Decision-making details documented in ED Course.  Discussion of management or test interpretation with external provider(s): I discussed case with Dr. Luis Coleman patient would be going into the OR to repair wound dehiscence.   I explained this findings and treatment plan with patient and patient's wife they voiced understanding.         APC / Resident Notes:   I have seen and examined this patient with the NP/PA.  I performed the substantive portion of the visit. I reviewed and agree with the NP/PA documentation.   63-year-old male 1 month postop right hip arthoplasty presenting after blunt trauma to the right hip resulting in surgical wound dehiscence.  Patient has not had fever.  The wound was bleeding with this is controlled at this time.  Case discussed with Dr. Luis Coleman, on call for orthopedic surgery.  Plan is to take the patient's the operating room for washout and repair.  Patient is amenable to admission.    Lana Stanford MD 2:43 AM              ED Course as of 02/26/24 0245   Sat Feb 24, 2024   2248 Chloride(!): 108 [YG]   2248 Glucose(!): 62 [YG]   2248 Globulin, Total(!): 3.6 [YG]   2248 Albumin/Globulin Ratio(!): 1.0 [YG]   2248 WBC(!): 16.58 [YG]   2248 MCHC(!): 32.4 [YG]      ED Course User Index  [YG] Geneva Flanagan PA          Cellulitis, abscess, necrotizing fasciitis, osteomyelitis, septic joint, MRSA, DVT, drug eruption, allergic/contact dermatitis, EM/SJS, viral exanthem, local trauma/contusion                  Clinical Impression:  Final diagnoses:  [Z48.89] Encounter for post surgical wound check  [T81.31XA] Postoperative wound dehiscence, initial encounter          ED Disposition Condition    Observation Stable                Lana Stanford MD  02/26/24 0246

## 2024-02-25 NOTE — TRANSFER OF CARE
"Anesthesia Transfer of Care Note    Patient: Eren Page    Procedure(s) Performed: Procedure(s) (LRB):  INCISION AND DRAINAGE, LOWER EXTREMITY (Right)    Patient location: PACU    Anesthesia Type: general    Transport from OR: Transported from OR on room air with adequate spontaneous ventilation    Post pain: adequate analgesia    Post assessment: no apparent anesthetic complications    Post vital signs: stable    Level of consciousness: sedated    Nausea/Vomiting: no nausea/vomiting    Complications: none    Transfer of care protocol was followed      Last vitals: Visit Vitals  /61   Pulse 71   Temp 36.7 °C (98 °F)   Resp 18   Ht 6' 1" (1.854 m)   Wt 124.3 kg (274 lb)   SpO2 100%   BMI 36.15 kg/m²     "

## 2024-02-25 NOTE — ANESTHESIA PREPROCEDURE EVALUATION
"                                                                                                             02/24/2024  Eren Page is a 63 y.o., male with chronic back pain and TRISTEN admitted today with history of [place] [facility] [place] and [place] [facility]  Patient presents I&D right lower extremity.  Patient tolerated general anesthesia in September for cervical laminoplasty with phenylephrine drip.  EKG from admit normal sinus rhythm.  CBG 65 and receiving D10. patient notes he has hardware in his back is not interested in any neuraxial anesthesia    Last 3 sets of Vitals        12/12/2023     9:53 AM 1/31/2024     1:54 PM 2/24/2024     7:33 PM   Vitals - 1 value per visit   SYSTOLIC 128 114 124   DIASTOLIC 87 60 65   Pulse 82 88 72   Temp  36.2 °C (97.1 °F) 36.7 °C (98 °F)   Resp 16  19   SPO2  96 % 98 %   Weight (lb) 289 284 274   Weight (kg) 131.09 128.822 124.286   Height 6' 1" (1.854 m) 6' 1" (1.854 m) 6' 1" (1.854 m)   BMI (Calculated) 38.1 37.5 36.2   Pain Score Four           Lab Results   Component Value Date    WBC 16.58 (H) 02/24/2024    HGB 12.2 (L) 02/24/2024    HCT 37.6 (L) 02/24/2024    MCV 90.2 02/24/2024     02/24/2024          BMP  Lab Results   Component Value Date     02/24/2024    K 3.9 02/24/2024     04/28/2022    CO2 24 02/24/2024    BUN 11.0 02/24/2024    CREATININE 0.79 02/24/2024    CALCIUM 9.2 02/24/2024    ANIONGAP 8 04/28/2022    ESTGFRAFRICA >60.0 04/28/2022    EGFRNONAA >60.0 04/28/2022      Pre-op Assessment    I have reviewed the Patient Summary Reports.    I have reviewed the NPO Status.   I have reviewed the Medications.     Review of Systems  Anesthesia Hx:               Denies Personal Hx of Anesthesia complications.                    Social:  Non-Smoker       Hematology/Oncology:       -- Anemia:                                  Cardiovascular:     Hypertension                Functional Capacity 3 METS         "                 Pulmonary:        Sleep Apnea, CPAP                Hepatic/GI:     GERD             Endocrine:  Diabetes, type 2 Hypothyroidism        Obesity / BMI > 30      Physical Exam  General: Well nourished, Cooperative, Alert and Oriented    Airway:  Mallampati: II   Mouth Opening: Normal  TM Distance: Normal  Tongue: Normal  Neck ROM: Normal ROM    Dental:  Dentures    Chest/Lungs:  Clear to auscultation, Normal Respiratory Rate    Heart:  Rate: Normal  Rhythm: Regular Rhythm        Anesthesia Plan  Type of Anesthesia, risks & benefits discussed:    Anesthesia Type: Gen Supraglottic Airway  Intra-op Monitoring Plan: Standard ASA Monitors  Post Op Pain Control Plan: multimodal analgesia and IV/PO Opioids PRN  Induction:  IV  Airway Plan: Direct  Informed Consent: Informed consent signed with the Patient and all parties understand the risks and agree with anesthesia plan.  All questions answered.   ASA Score: 3  Day of Surgery Review of History & Physical: H&P Update referred to the surgeon/provider.    Ready For Surgery From Anesthesia Perspective.     .

## 2024-02-25 NOTE — NURSING
Nurses Note -- 4 Eyes      2/25/2024   2:00 AM      Skin assessed during: Transfer      [x] No Altered Skin Integrity Present    []Prevention Measures Documented      [] Yes- Altered Skin Integrity Present or Discovered   [] LDA Added if Not in Epic (Describe Wound)   [] New Altered Skin Integrity was Present on Admit and Documented in LDA   [] Wound Image Taken    Wound Care Consulted? No    Attending Nurse:  Ольга Fang RN/Staff Member:   JASPER BARR

## 2024-02-25 NOTE — PROGRESS NOTES
[facility] - 9th Floor Med Surg  Orthopedics  Progress Note    Patient Name: Eren Page  MRN: [id number]  Admission Date: 2/24/2024  Hospital Length of Stay: 0 days  Attending Provider: Luis Coleman DO  Primary Care Provider: Arun An MD  Follow-up For: Procedure(s) (LRB):  INCISION AND DRAINAGE, LOWER EXTREMITY (Right)    Post-Operative Day: 1 Day Post-Op  Subjective:     Principal Problem:Wound dehiscence    Principal Orthopedic Problem: 1 Day Post-Op   I&D of right hip surgical wound dehiscence with closure    Interval History:  Patient presented with surgical wound dehiscence yesterday he is approximately 4 weeks out from a total hip arthroplasty with Dr. Granados.  He underwent I&D yesterday without complication and closure of his wound.  He is doing well this morning with adequate pain control.  He is weight-bearing as tolerated on his hip prosthesis otherwise has no complaints.  He is on Ancef 24 hours postop.  no numbness or tingling distally    Review of patient's allergies indicates:   Allergen Reactions    Adhesive tape-silicones Other (See Comments)     welps and skin breakdown       Current Facility-Administered Medications   Medication    acetaminophen tablet 650 mg    albuterol-ipratropium 2.5 mg-0.5 mg/3 mL nebulizer solution 3 mL    amLODIPine tablet 10 mg    cefazolin (ANCEF) 2 gram in dextrose 5% 50 mL IVPB (premix)    cefazolin (ANCEF) 2 gram in dextrose 5% 50 mL IVPB (premix)    dextrose 10% bolus 125 mL 125 mL    dextrose 10% bolus 250 mL 250 mL    enoxaparin injection 40 mg    glucagon (human recombinant) injection 1 mg    glucose chewable tablet 16 g    glucose chewable tablet 24 g    lisinopriL tablet 20 mg    And    hydroCHLOROthiazide tablet 25 mg    HYDROmorphone (PF) injection 0.2 mg    HYDROmorphone (PF) injection 0.4 mg    LIDOcaine HCL 10 mg/ml (1%) injection 1 mL    melatonin tablet 6 mg    meperidine (PF) injection 12.5 mg    metFORMIN tablet 1,000 mg     "morphine injection 4 mg    ondansetron disintegrating tablet 8 mg    ondansetron injection 4 mg    oxyCODONE immediate release tablet Tab 10 mg    pantoprazole EC tablet 40 mg    prochlorperazine injection Soln 5 mg    sodium chloride 0.9% flush 10 mL    traZODone tablet 50 mg     Objective:     Vital Signs (Most Recent):  Temp: 97.9 °F (36.6 °C) (02/25/24 0829)  Pulse: 81 (02/25/24 0829)  Resp: 20 (02/25/24 0537)  BP: 110/69 (02/25/24 0829)  SpO2: 97 % (02/25/24 0829) Vital Signs (24h Range):  Temp:  [97.7 °F (36.5 °C)-98.8 °F (37.1 °C)] 97.9 °F (36.6 °C)  Pulse:  [] 81  Resp:  [16-20] 20  SpO2:  [97 %-100 %] 97 %  BP: (107-127)/(59-75) 110/69     Weight: 124.3 kg (274 lb)  Height: 6' 1" (185.4 cm)  Body mass index is 36.15 kg/m².      Intake/Output Summary (Last 24 hours) at 2/25/2024 0901  Last data filed at 2/25/2024 0057  Gross per 24 hour   Intake 700 ml   Output 200 ml   Net 500 ml       Physical Exam:   General the patient is alert and oriented x3 no acute distress nontoxic-appearing appropriate affect.    Constitutional: Vital signs are examined and stable.  Resp: No signs of labored breathing    RLE: -Skin: Dressing CDI with mild serosanguineous drainage present, No signs of new abrasions or lacerations, no scars           -MSK: : Hip and Knee F/E, EHL/FHL, Gastroc/Tib anterior Strength 5/5           -Neuro:  Sensation intact to light touch L3-S1 dermatomes           -Lymphatic: No signs of lymphadenopathy           -CV: Capillary refill is less than 2 seconds. DP/PT pulses  2/4. Compartments soft and compressible.        Diagnostic Findings:   Significant Labs:   Recent Lab Results         02/25/24  0517   02/25/24  0044   02/24/24  2316   02/24/24  2112        Albumin/Globulin Ratio       1.0       Albumin       3.5       ALP       84       ALT       13       AST       21       Baso #       0.1658       Basophil %       1       BILIRUBIN TOTAL       0.5       BUN       11.0       Calcium       " 9.2       Chloride       108       CO2       24       Creatinine       0.79       eGFR       >60       Eos #       0.829       Eos %       5       Estimated Avg Glucose       125.5       Globulin, Total       3.6       Glucose       62       Gran # (ANC)       11.7718       Hematocrit       37.6       Hemoglobin       12.2       Hemoglobin A1C External       6.0       Lymph #       3.1502       Lymphocyte %       19       MCH       29.3       MCHC       32.4       MCV       90.2       Mono #       0.829       Mono %       5       MPV       9.2       Neutrophils Relative       71       nRBC       0.0       Platelet Estimate       Adequate       Platelet Count       366       POCT Glucose 302   83   99         Potassium       3.9       PROTEIN TOTAL       7.1       RBC       4.17       RBC Morph       Normal       RDW       15.1       Sodium       142       WBC       16.58              16.58                Significant Imaging: I have reviewed and interpreted all pertinent imaging results/findings.     Assessment/Plan:     Active Diagnoses:    Diagnosis Date Noted POA    PRINCIPAL PROBLEM:  Wound dehiscence [T81.30XA] 02/24/2024 Yes      Problems Resolved During this Admission:   Labs are stable this morning.  We will continue with 24 hours of Ancef prior to discharge.  He is stable for discharge today.  we will discharge him with oral Keflex along with multimodal pain control outpatient.  He may continue with his previous weight-bearing protocol weight-bearing as tolerated range of motion as tolerated per Dr. GRANADOS.  He will follow up with Dr. Granados outpatient for continued follow up.  He may follow up with us as needed.      The above findings, diagnostics, and treatment plan were discussed with Dr. Coleman who is in agreement with the plan of care except as stated in additional documentation.      Teri Rascon PA-C  Orthopedic Trauma Surgery  [facility]

## 2024-02-25 NOTE — OP NOTE
OPERATIVE REPORT      Patient: Eren Page   : 1960    MRN: [id number]  Date: 2024      Surgeon:Luis Coleman DO  Assistant: Pj Rascon was essential, part of the procedure including deep hardware placement and deep closure.  No senior assistant was availible  Preoperative Diagnosis:  Right surgical wound dehiscence status post total hip arthroplasty 4 weeks ago (Dr. Granados)   Postoperative Diagnosis: Same  Procedure:  Severe surgical wound dehiscence excision with complex closure right hip-CPT 15622  Anesthesiologist: Talon Morejon Jr., MD  OR Staff: Circulator: Oumou Tovar RN  Scrub Person: Chely Melvin  Implants: * No implants in log *  EBL: 100cc  Complications: None  Disposition: To PACU, stable    Indications: Eren Page is a 63 y.o. male presenting with the aforementioned injuries/findings. The procedure is indicated to protect the prosthesis and decrease infection.  The patient is awake and alert. After thorough discussion of the risks, benefits, expected outcomes, and alternatives to surgical intervention, the patient agreed to proceed with surgical treatment.  Specific risks discussed included, but were not limited to: superficial or deep infection, wound healing complications, DVT/PE, significant bleeding requiring transfusion, damage to named anatomic structures in the immediate area including named neurovascular structures, infection, nonunion, malunion and general risks of anesthesia.  The patient voiced understanding and written as well as verbal consent was obtained by myself prior to the procedure.    Procedure Note:  The patient was brought back to the OR and placed supine on the OR table. After successful induction of anesthesia by anesthesia staff, the patient was positioned in the supine position and all bony prominences were padded appropriately.  The surgical field was then provisionally cleansed and then prepped and draped in the usual sterile  fashion.    At this time a time-out was performed, with the correct patient, site, and procedure identified.  The universal time out as well as sign your site protocols were followed.  Preoperative antibiotics were verified as administered.     Attention is drawn to the lateral aspect of the hip.  Patient appears to have a 12 cm full-thickness surgical wound dehiscence.  This has a oozing from the wound bed.  No sign of purulence no sign of infection currently.  Patient's deep layer completely obliterated.  Loss of anatomic landmarks.  I completed excisional debridement of the surgical wound dehiscence with a 15 blade excise necrotic skin muscle fascia and clot.  We then copiously irrigated with multiple fluid washes.  I then used Aquamantys for skin oozing.  Satisfied thigh wound bed I then placed vancomycin and hemo blast deep.  We then completed a complex layered closure as stated below    The incision(s) was/were then irrigated using copious sterile saline and then vancomyocin was added to the wound bed for prophylaxis. The surgical wound was closed in layered fashion.  The surgical site(s) was/were were sterilely cleansed and dressed.    The patient was then subsequently transferred to to PACU in a stable condition.    All sponge and needle counts were correct at the end of the case.  I was present and participated in all aspects of the procedure.    Prognosis:  The patient will be kept WBAT on the ipsilateral extremity .  Patient will receive DVT prophylaxis .     Patient will be transferred back over to Dr. Granados's care for further treatment.Family aware.      This note/OR report was created with the assistance of  voice recognition software or phone  dictation.  There may be transcription errors as a result of using this technology however minimal. Effort has been made to assure accuracy of transcription but any obvious errors or omissions should be clarified with the author of the document.       Luis  Jraed DO  Orthopedic Trauma Surgery

## 2024-02-25 NOTE — H&P
[facility] - Emergency Dept  Orthopedic Trauma  History and Physical    Patient Name: Eren Page  MRN: [id number]  Admission Date: 2/24/2024  Hospital Length of Stay: 0 days  Attending Provider: Luis Coleman DO  Primary Care Provider: Arun An MD        Chief Complaint:   Chief Complaint   Patient presents with    Wound Check     R hip replacement on 01/23 in [place] with Dr. Granados. Pt fell last night & landed on the hip, was seen at [facility] & was DC home. Pts Daughter spoke with Dr. Coleman & they were told to come in to be evaluated.         HPI:  Patient is a 63-year-old gentleman 4 weeks out a right total hip arthroplasty.  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  He currently is experiencing a significant amount of oozing from the surgical site.  No fevers no chills no numbness no tingling.    Past Medical History:   Diagnosis Date    Arthritis     Back pain     Balance problems     Cervical spondylosis with radiculopathy     Claudication     Depression     Diabetes mellitus     Dizziness     Dorsalgia     Dyslipidemia     Dysphagia 04/2022    GERD (gastroesophageal reflux disease)     Headache     History of chest pain     History of kidney stones     Hypertension     Hypothyroidism     Insomnia     Irregular heart beat     Obesity     Palpitations     Right hip pain     Right leg pain     Right leg weakness     Shoulder pain     Sleep apnea     uses CPAP    SOB (shortness of breath)     Spinal stenosis of lumbar region, unspecified whether neurogenic claudication present     Wears dentures     FULL       Past Surgical History:   Procedure Laterality Date    APPENDECTOMY      BACK SURGERY  2000    L4-5 and L5-S1 ALIF with pedicle screws.    CHOLECYSTOTOMY      COLONOSCOPY      DECOMPRESSION OF LUMBAR SPINE USING MINIMALLY INVASIVE TECHNIQUE Bilateral 06/13/2023    L2-3, L3-4 decompression.   Dr. Barclay    FORAMINOTOMY N/A 9/14/2023    Procedure: FORAMINOTOMY, SPINE;  Surgeon: Renny Barclay MD;  Location: [facility] OR;  Service: Neurosurgery;  Laterality: N/A;  C3-7 laminoplasty, then anterior removal of C2-7 osteophytes (posterior then flip and remove anterior osteophytes)    LAMINOPLASTY N/A 9/14/2023    Procedure: LAMINOPLASTY;  Surgeon: Renny Barclay MD;  Location: [facility] OR;  Service: Neurosurgery;  Laterality: N/A;  C3-7 laminoplasty, then anterior removal of C2-7 osteophytes (posterior then flip and remove anterior osteophytes)  NTI  Owen w/ Adriana  //  XX    TONSILLECTOMY      TOTAL KNEE ARTHROPLASTY Bilateral     VASECTOMY         Review of patient's allergies indicates:   Allergen Reactions    Adhesive tape-silicones Other (See Comments)     welps and skin breakdown       Current Facility-Administered Medications   Medication    [START ON 2/25/2024] acetaminophen tablet 650 mg    [START ON 2/25/2024] amLODIPine tablet 10 mg    cefazolin (ANCEF) 2 gram in dextrose 5% 50 mL IVPB (premix)    [START ON 2/25/2024] cefazolin (ANCEF) 2 gram in dextrose 5% 50 mL IVPB (premix)    [START ON 2/25/2024] dextrose 10% bolus 125 mL 125 mL    dextrose 10% bolus 250 mL 250 mL    [START ON 2/25/2024] dextrose 10% bolus 250 mL 250 mL    [START ON 2/25/2024] enoxaparin injection 40 mg    [START ON 2/25/2024] glucagon (human recombinant) injection 1 mg    [START ON 2/25/2024] glucose chewable tablet 16 g    [START ON 2/25/2024] glucose chewable tablet 24 g    [START ON 2/25/2024] LIDOcaine HCL 10 mg/ml (1%) injection 1 mL    [START ON 2/25/2024] lisinopriL-hydrochlorothiazide 20-25 mg per tablet 1 tablet    [START ON 2/25/2024] melatonin tablet 6 mg    metFORMIN tablet 1,000 mg    [START ON 2/25/2024] morphine injection 4 mg    [START ON 2/25/2024] ondansetron disintegrating tablet 8 mg    [START ON 2/25/2024] oxyCODONE immediate release tablet Tab 10 mg    [START ON 2/25/2024] pantoprazole EC tablet 40 mg    [START  ON 2/25/2024] sodium chloride 0.9% flush 10 mL    [START ON 2/25/2024] traZODone tablet 50 mg     Current Outpatient Medications   Medication Sig    amLODIPine (NORVASC) 10 MG tablet Take 10 mg by mouth once daily.    cholecalciferol, vitamin D3, (VITAMIN D3) 25 mcg (1,000 unit) capsule Take 1,000 Units by mouth once daily.    cyclobenzaprine (FLEXERIL) 10 MG tablet Take 1 tablet (10 mg total) by mouth 3 (three) times daily as needed for Muscle spasms.    FLUoxetine 20 MG capsule Take 1 capsule (20 mg total) by mouth once daily.    furosemide (LASIX) 40 MG tablet Take 40 mg by mouth 2 (two) times daily.    glimepiride (AMARYL) 4 MG tablet Take 1 tablet (4 mg total) by mouth once daily. Take one tablet daily.    ibuprofen-famotidine (DUEXIS) 800-26.6 mg Tab Take 1 tablet by mouth once daily.    levothyroxine (SYNTHROID) 100 MCG tablet 1 tablet on an empty stomach in the morning    lisinopriL-hydrochlorothiazide (PRINZIDE,ZESTORETIC) 20-25 mg Tab Take 1 tablet by mouth once daily.    meloxicam (MOBIC) 15 MG tablet Take 15 mg by mouth once daily.    metFORMIN (GLUCOPHAGE) 1000 MG tablet Take 1 tablet (1,000 mg total) by mouth 2 (two) times a day. Take one tablet by mouth twice daily.    naltrexone-bupropion (CONTRAVE) 8-90 mg TbSR Take by mouth.    oxyCODONE-acetaminophen (PERCOCET)  mg per tablet Take 1 tablet by mouth every 6 (six) hours as needed.    pantoprazole (PROTONIX) 40 MG tablet Take 1 tablet (40 mg total) by mouth once daily.    pioglitazone (ACTOS) 15 MG tablet 1 tablet Orally Once a day    rosuvastatin (CRESTOR) 5 MG tablet Take 1 tablet (5 mg total) by mouth after dinner.    semaglutide (OZEMPIC) 0.25 mg or 0.5 mg (2 mg/3 mL) pen injector Inject 0.5 mg into the skin every 7 days.    traZODone (DESYREL) 50 MG tablet Take 50 mg by mouth every evening.    zolpidem (AMBIEN) 10 mg Tab Take 5 mg by mouth nightly as needed.     Family History       Problem Relation (Age of Onset)    Cancer Sister, Sister  "   Cervical cancer Mother    Colon cancer Father    Heart attack Father (82)    Heart disease Mother (76)    No Known Problems Brother, Brother, Brother, Brother, Brother          Tobacco Use    Smoking status: Never    Smokeless tobacco: Never   Substance and Sexual Activity    Alcohol use: Yes     Comment: rarely    Drug use: Never    Sexual activity: Not on file       ROS:  Constitutional: Denies fever chills  Eyes: No change in vision  ENT: No ringing or current infections  CV: No chest pain  Resp: No labored breathing  MSK: Pain evident at site of injury located in HPI,   Integ: No signs of abrasions or lacerations  Neuro: No numbness or tingling  Lymphatic: No swelling outside the area of injury   Objective:     Vital Signs (Most Recent):  Temp: 98 °F (36.7 °C) (02/24/24 1933)  Pulse: 71 (02/24/24 2241)  Resp: 18 (02/24/24 2241)  BP: 127/61 (02/24/24 2241)  SpO2: 100 % (02/24/24 2241) Vital Signs (24h Range):  Temp:  [98 °F (36.7 °C)] 98 °F (36.7 °C)  Pulse:  [71-79] 71  Resp:  [18-19] 18  SpO2:  [98 %-100 %] 100 %  BP: (109-127)/(59-75) 127/61     Weight: 124.3 kg (274 lb)  Height: 6' 1" (185.4 cm)  Body mass index is 36.15 kg/m².    No intake or output data in the 24 hours ending 02/24/24 2320    Ortho/SPM Exam  General the patient is alert and oriented x3 no acute distress nontoxic-appearing appropriate affect.    Constitutional: Vital signs are examined and stable.  Resp: No signs of labored breathing                        RLE: -Skin:  Full-thickness 6 cm surgical wound dehiscence.  Obvious oozing from the site.  No sign of purulence no sign of infection.  No signs of new abrasions or lacerations, no scars           -MSK: : Hip and Knee F/E, EHL/FHL, Gastroc/Tib anterior Strength 5/5           -Neuro:  Sensation intact to light touch L3-S1 dermatomes           -Lymphatic: No signs of lymphadenopathy           -CV: Capillary refill is less than 2 seconds. DP/PT pulses  2/4. Compartments soft and " compressible.     Significant Labs: All pertinent labs within the past 24 hours have been reviewed.  Recent Lab Results         02/24/24  2316   02/24/24  2112        Albumin/Globulin Ratio   1.0       Albumin   3.5       ALP   84       ALT   13       AST   21       Baso #   0.1658       Basophil %   1       BILIRUBIN TOTAL   0.5       BUN   11.0       Calcium   9.2       Chloride   108       CO2   24       Creatinine   0.79       eGFR   >60       Eos #   0.829       Eos %   5       Globulin, Total   3.6       Glucose   62       Gran # (ANC)   11.7718       Hematocrit   37.6       Hemoglobin   12.2       Lymph #   3.1502       Lymphocyte %   19       MCH   29.3       MCHC   32.4       MCV   90.2       Mono #   0.829       Mono %   5       MPV   9.2       Neutrophils Relative   71       nRBC   0.0       Platelet Estimate   Adequate       Platelet Count   366       POCT Glucose 99         Potassium   3.9       PROTEIN TOTAL   7.1       RBC   4.17       RBC Morph   Normal       RDW   15.1       Sodium   142       WBC   16.58          16.58                Significant Imaging: I have reviewed all pertinent imaging results/findings.  X-Ray Hip 2 or 3 views Right (with Pelvis when performed)    Result Date: 2/24/2024  EXAMINATION: XR HIP WITH PELVIS WHEN PERFORMED, 2 OR 3  VIEWS RIGHT CLINICAL HISTORY: Fall; TECHNIQUE: AP view of the pelvis and frog leg lateral view of the right hip were performed. COMPARISON: None FINDINGS: There is a prosthesis in the right hip.  There degenerative changes on the left.  There is hardware in the spine.  An acute fracture is not seen.     Good position of right hip prosthesis Electronically signed by: Gaurang Carr MD Date:    02/24/2024 Time:    20:54       Assessment/Plan:     Active Diagnoses:    Diagnosis Date Noted POA    PRINCIPAL PROBLEM:  Wound dehiscence [T81.30XA] 02/24/2024 Yes      Problems Resolved During this Admission:         Patient is a is a part of Dr. Granados office.   Patient had a right total hip arthroplasty done a proximally 4 weeks ago he had a fall yesterday onto his right hip and had bleeding in a surgical wound dehiscence.  They have reached out to the office.  Due to the newly placed hardware.  The patient was brought to [facility] for evaluation.  Recommending surgical incision and drainage and closure of the surgical wound dehiscence.  Patient will be admitted overnight for IV antibiotics and discharge tomorrow morning back to Dr. Darwin naik.      I explained that surgery and the nature of their condition are not without risks. These include, but are not limited to, bleeding, infection, neurovascular compromise, malunion, nonunion, hardware complications, wound complications, scarring, cosmetic defects, need for later and/or repeated surgeries, avascular necrosis, bone death due to initial trauma, pain, loss of ROM, loss of function, PTOA, deformity, stance/gait and/or functional abnormalities, thromboembolic complications, compartment syndrome, loss of limb, loss of life, anesthetic complications, and other imponderables. I explained that these can occur despite the adequacy of treatments rendered, and that their risks are heightened given the nature of their condition. They verbalized understanding. They would like to continue with surgery at this time. If appropriate family was involved with surgical discussion.     The patient has been prescribed an opioid analgesic that exceeds the limits set forth in the opioid prescribing rules for acute pain. Treatment with non-opioid medications is not a suitable alternative given the patient's condition. The patient meets exception criteria for: exceeding the 5 or 7 day rule when prescribing narcotic medications because: he/she suffers from a injury that requires more than 5 or 7 day duration because the patient is continuing to recover. This is the minimum duration consistent with the patient's medical  condition.  The prescribed dose and/or duration is the level necessary to therapeutically treat the patient's pain. The patient was advised of the risks and benefits of opioid medications, including the potential for addiction.    This note/OR report was created with the assistance of  voice recognition software or phone  dictation.  There may be transcription errors as a result of using this technology however minimal. Effort has been made to assure accuracy of transcription but any obvious errors or omissions should be clarified with the author of the document.       Luis Coleman,    Orthopedic Trauma Surgery  [facility] - Emergency Dept

## 2024-02-25 NOTE — FIRST PROVIDER EVALUATION
"Medical screening examination initiated.  I have conducted a focused provider triage encounter, findings are as follows:    Brief history of present illness:  Patient states that he is about 1 month post right hip replacement. States that he fell last night and it caused the incision to "open up."    There were no vitals filed for this visit.    Pertinent physical exam:  Awake, alert, ambulatory      Brief workup plan:  Labs, Imaging    Preliminary workup initiated; this workup will be continued and followed by the physician or advanced practice provider that is assigned to the patient when roomed.  "

## 2024-02-26 ENCOUNTER — PATIENT MESSAGE (OUTPATIENT)
Dept: ADMINISTRATIVE | Facility: CLINIC | Age: 64
End: 2024-02-26
Payer: COMMERCIAL

## 2024-02-26 ENCOUNTER — PATIENT OUTREACH (OUTPATIENT)
Dept: ADMINISTRATIVE | Facility: CLINIC | Age: 64
End: 2024-02-26
Payer: COMMERCIAL

## 2024-02-26 NOTE — PROGRESS NOTES
C3 nurse attempted to contact Eren Page  for a TCC post hospital discharge follow up call. No answer. Left voicemail with callback information. The patient had a scheduled HOSFU appointment with Dr. Granados on 02/26/2024.   Message left via patient's portal regarding follow up call.

## 2024-02-26 NOTE — ANESTHESIA POSTPROCEDURE EVALUATION
Anesthesia Post Evaluation    Patient: Eren Page    Procedure(s) Performed: Procedure(s) (LRB):  INCISION AND DRAINAGE, LOWER EXTREMITY (Right)    Final Anesthesia Type: general      Patient location during evaluation: PACU  Patient participation: Yes- Able to Participate  Level of consciousness: awake and alert  Post-procedure vital signs: reviewed and stable  Pain management: adequate  Airway patency: patent    PONV status at discharge: No PONV  Anesthetic complications: no      Cardiovascular status: blood pressure returned to baseline  Respiratory status: spontaneous ventilation and room air  Hydration status: euvolemic  Follow-up not needed.              Vitals Value Taken Time   /63 02/25/24 1125   Temp 37.1 °C (98.8 °F) 02/25/24 1125   Pulse 73 02/25/24 1125   Resp 18 02/25/24 1058   SpO2 98 % 02/25/24 1125         Event Time   Out of Recovery 02/25/2024 01:00:00         Pain/Sharifa Score: Pain Rating Prior to Med Admin: 6 (2/25/2024 10:58 AM)  Pain Rating Post Med Admin: 3 (2/25/2024 11:58 AM)  Sharifa Score: 10 (2/25/2024  1:00 AM)

## 2024-02-26 NOTE — PROGRESS NOTES
C3 nurse spoke with Eren Page  for a TCC post hospital discharge follow up call. Requested call back.  Stated sitting in Dr. Granados's office right now.

## 2024-02-27 NOTE — PROGRESS NOTES
C3 nurse spoke with Eren Page  for a TCC post hospital discharge follow up call. The patient has next scheduled appointment with Dr. Granados on 03/04/2024.

## 2024-02-27 NOTE — PROGRESS NOTES
C3 nurse attempted to contact Eren Page  for a TCC post hospital discharge follow up call. No answer. Left voicemail with callback information. The patient had a scheduled HOSFU appointment with Dr. Granados on 02/26/2024.

## 2024-07-28 NOTE — PATIENT INSTRUCTIONS
Problem: Potential for Falls  Goal: Patient will remain free of falls  Description: INTERVENTIONS:  - Educate patient/family on patient safety including physical limitations  - Instruct patient to call for assistance with activity   - Consult OT/PT to assist with strengthening/mobility   - Keep Call bell within reach  - Keep bed low and locked with side rails adjusted as appropriate  - Keep care items and personal belongings within reach  - Initiate and maintain comfort rounds  - Make Fall Risk Sign visible to staff  - Initiate/Maintain bed alarm  - Apply yellow socks and bracelet for high fall risk patients  - Consider moving patient to room near nurses station  Outcome: Progressing     Problem: PAIN - ADULT  Goal: Verbalizes/displays adequate comfort level or baseline comfort level  Description: Interventions:  - Encourage patient to monitor pain and request assistance  - Assess pain using appropriate pain scale  - Administer analgesics based on type and severity of pain and evaluate response  - Implement non-pharmacological measures as appropriate and evaluate response  - Consider cultural and social influences on pain and pain management  - Notify physician/advanced practitioner if interventions unsuccessful or patient reports new pain  Outcome: Progressing     Problem: INFECTION - ADULT  Goal: Absence or prevention of progression during hospitalization  Description: INTERVENTIONS:  - Assess and monitor for signs and symptoms of infection  - Monitor lab/diagnostic results  - Monitor all insertion sites, i.e. indwelling lines, tubes, and drains  - [place] appropriate cooling/warming therapies per order  - Administer medications as ordered  - Instruct and encourage patient and family to use good hand hygiene technique  - Identify and instruct in appropriate isolation precautions for identified infection/condition  Outcome: Progressing     Problem: SAFETY ADULT  Goal: Patient will remain free of  Stop duexis, and meloxicam now.    Nothing to eat are drink the morning of surgery.   falls  Description: INTERVENTIONS:  - Educate patient/family on patient safety including physical limitations  - Instruct patient to call for assistance with activity   - Consult OT/PT to assist with strengthening/mobility   - Keep Call bell within reach  - Keep bed low and locked with side rails adjusted as appropriate  - Keep care items and personal belongings within reach  - Initiate and maintain comfort rounds  - Make Fall Risk Sign visible to staff  - Initiate/Maintain bed alarm  - Apply yellow socks and bracelet for high fall risk patients  - Consider moving patient to room near nurses station  Outcome: Progressing  Goal: Maintain or return to baseline ADL function  Description: INTERVENTIONS:  -  Assess patient's ability to carry out ADLs; assess patient's baseline for ADL function and identify physical deficits which impact ability to perform ADLs (bathing, care of mouth/teeth, toileting, grooming, dressing, etc.)  - Assess/evaluate cause of self-care deficits   - Assess range of motion  - Assess patient's mobility; develop plan if impaired  - Assess patient's need for assistive devices and provide as appropriate  - Encourage maximum independence but intervene and supervise when necessary  - Involve family in performance of ADLs  - Assess for home care needs following discharge   - Consider OT consult to assist with ADL evaluation and planning for discharge  - Provide patient education as appropriate  Outcome: Progressing     Problem: DISCHARGE PLANNING  Goal: Discharge to home or other facility with appropriate resources  Description: INTERVENTIONS:  - Identify barriers to discharge w/patient and caregiver  - Arrange for needed discharge resources and transportation as appropriate  - Identify discharge learning needs (meds, wound care, etc.)  - Arrange for interpretive services to assist at discharge as needed  - Refer to Case Management Department for coordinating discharge planning if the patient needs  post-hospital services based on physician/advanced practitioner order or complex needs related to functional status, cognitive ability, or social support system  Outcome: Progressing     Problem: Knowledge Deficit  Goal: Patient/family/caregiver demonstrates understanding of disease process, treatment plan, medications, and discharge instructions  Description: Complete learning assessment and assess knowledge base.  Interventions:  - Provide teaching at level of understanding  - Provide teaching via preferred learning methods  Outcome: Progressing     Problem: NEUROSENSORY - ADULT  Goal: Achieves stable or improved neurological status  Description: INTERVENTIONS  - Monitor and report changes in neurological status  - Monitor vital signs such as temperature, blood pressure, glucose, and any other labs ordered   - Initiate measures to prevent increased intracranial pressure  - Monitor for seizure activity and implement precautions if appropriate      Outcome: Progressing  Goal: Remains free of injury related to seizures activity  Description: INTERVENTIONS  - Maintain airway, patient safety  and administer oxygen as ordered  - Monitor patient for seizure activity, document and report duration and description of seizure to physician/advanced practitioner  - If seizure occurs,  ensure patient safety during seizure  - Reorient patient post seizure  - Seizure pads on all 4 side rails  - Instruct patient/family to notify RN of any seizure activity including if an aura is experienced  - Instruct patient/family to call for assistance with activity based on nursing assessment  - Administer anti-seizure medications if ordered    Outcome: Progressing     Problem: CARDIOVASCULAR - ADULT  Goal: Maintains optimal cardiac output and hemodynamic stability  Description: INTERVENTIONS:  - Monitor I/O, vital signs and rhythm  - Monitor for S/S and trends of decreased cardiac output  - Administer and titrate ordered vasoactive  medications to optimize hemodynamic stability  - Assess quality of pulses, skin color and temperature  - Assess for signs of decreased coronary artery perfusion  - Instruct patient to report change in severity of symptoms  Outcome: Progressing  Goal: Absence of cardiac dysrhythmias or at baseline rhythm  Description: INTERVENTIONS:  - Continuous cardiac monitoring, vital signs, obtain 12 lead EKG if ordered  - Administer antiarrhythmic and heart rate control medications as ordered  - Monitor electrolytes and administer replacement therapy as ordered  Outcome: Progressing     Problem: METABOLIC, FLUID AND ELECTROLYTES - ADULT  Goal: Electrolytes maintained within normal limits  Description: INTERVENTIONS:  - Monitor labs and assess patient for signs and symptoms of electrolyte imbalances  - Administer electrolyte replacement as ordered  - Monitor response to electrolyte replacements, including repeat lab results as appropriate  - Instruct patient on fluid and nutrition as appropriate  Outcome: Progressing  Goal: Fluid balance maintained  Description: INTERVENTIONS:  - Monitor labs   - Monitor I/O and WT  - Instruct patient on fluid and nutrition as appropriate  - Assess for signs & symptoms of volume excess or deficit  Outcome: Progressing

## 2025-01-07 ENCOUNTER — TELEPHONE (OUTPATIENT)
Dept: NEUROSURGERY | Facility: CLINIC | Age: 65
End: 2025-01-07
Payer: COMMERCIAL

## 2025-01-07 DIAGNOSIS — M47.22 CERVICAL SPONDYLOSIS WITH MYELOPATHY AND RADICULOPATHY: Primary | ICD-10-CM

## 2025-01-07 DIAGNOSIS — M47.12 CERVICAL SPONDYLOSIS WITH MYELOPATHY AND RADICULOPATHY: Primary | ICD-10-CM

## 2025-01-07 NOTE — TELEPHONE ENCOUNTER
I spoke with the patient to get him scheduled per Ema's recommendations. He is scheduled to see Ema for 1/13/25 at 1:00, XR at [facility] for 12:00. He was compliant w date and time.

## 2025-01-07 NOTE — TELEPHONE ENCOUNTER
Please have the patient come in with updated x-rays of the cervical spine to see an WENDIE sooner rather than later.  I have entered the orders.  Thanks

## 2025-01-07 NOTE — PROGRESS NOTES
[facility]  History & Physical  Neurosurgery      Eren Page   [id number]   1960     SUBJECTIVE:     CHIEF COMPLAINT:  3 month postoperative visit    HPI:  Eren Page is a 64 y.o. male who presents for a 3 month post-operative follow-up.  He is s/p  C3-7 laminoplasties & anterior exploration & resection of C2-7 ventral osteophytes  by Dr. Barclay on 9/14/2023.     The patient was last seen in my clinic on 12/12/2023 describing posterior neck tightness radiating into the left trapezius and left interscapular region.  He describes occasional stabbing pain into left interscapular region as well with stiffness and numbness into the bilateral arms and hands greater on the left.  On 10/18/2023 the patient underwent an EMG nerve conduction study with Dr. Granados per Dr. Barclay's recommendations which revealed bilateral median and ulnar neuropathy.  It was recommended at the patient's previous visit that he begin splinting at night for his wrists although the patient states he did not receive orders to obtain these medical devices. He completed physical therapy although he feels this did not provide him much improvement of his symptoms.  He continued to struggle with some achiness and stiffness into the lower back as well with prolonged physical activity.  He is denying any issues with balance or bowel or bladder function at that time.  On 1/7/2025 the patient's wife notified our office regarding the patient's severe migraines over the last 4 months.  The patient presents today describing frequent headaches radiating from his temples and eyes to the base of his head.  He states he does have light sensitivity with nausea and vomiting when these headaches occur.  He states he can feel them coming on and with any type of stimulation his symptoms worsened.  He does find a slight relief with lying down in a dimly lit room the covers over his head.  He also has attempted Tylenol migraine and Aleve migraine  which provide mild relief.  He does continue to have some stiffness and achiness into the posterior neck and upper trapezius region.  He continues on with some numbness and tingling into the bilateral hands and states he has attempted several times to obtain bilateral wrist splints although has been able to obtain these devices.  The patient has a recent history of a right hip replacement with dehiscence and revision.  He also underwent a right knee replacement modification over the summer.    Past Medical History:   Diagnosis Date    Arthritis     Back pain     Balance problems     Cervical spondylosis with radiculopathy     Claudication     Depression     Diabetes mellitus     Dizziness     Dorsalgia     Dyslipidemia     Dysphagia 04/2022    GERD (gastroesophageal reflux disease)     Headache     History of chest pain     History of kidney stones     Hypertension     Hypothyroidism     Insomnia     Irregular heart beat     Obesity     Palpitations     Right hip pain     Right leg pain     Right leg weakness     Shoulder pain     Sleep apnea     uses CPAP    SOB (shortness of breath)     Spinal stenosis of lumbar region, unspecified whether neurogenic claudication present     Wears dentures     FULL    Wellness examination 1/13/2025       Past Surgical History:   Procedure Laterality Date    APPENDECTOMY      BACK SURGERY  2000    L4-5 and L5-S1 ALIF with pedicle screws.    CHOLECYSTOTOMY      COLONOSCOPY      DECOMPRESSION OF LUMBAR SPINE USING MINIMALLY INVASIVE TECHNIQUE Bilateral 06/13/2023    L2-3, L3-4 decompression.  Dr. Barclay    FORAMINOTOMY N/A 9/14/2023    Procedure: FORAMINOTOMY, SPINE;  Surgeon: Renny Barclay MD;  Location: [facility];  Service: Neurosurgery;  Laterality: N/A;  C3-7 laminoplasty, then anterior removal of C2-7 osteophytes (posterior then flip and remove anterior osteophytes)    INCISION AND DRAINAGE, LOWER EXTREMITY Right 2/24/2024    Procedure: INCISION AND DRAINAGE, LOWER EXTREMITY;   Surgeon: Luis Coleman DO;  Location: [facility] OR;  Service: Orthopedics;  Laterality: Right;  lateral bean bag wash stuff    LAMINOPLASTY N/A 9/14/2023    Procedure: LAMINOPLASTY;  Surgeon: Renny Barclay MD;  Location: [facility] OR;  Service: Neurosurgery;  Laterality: N/A;  C3-7 laminoplasty, then anterior removal of C2-7 osteophytes (posterior then flip and remove anterior osteophytes)  NTI  Owen w/ Adriana  //  XX    TONSILLECTOMY      TOTAL KNEE ARTHROPLASTY Bilateral     VASECTOMY         Family History   Problem Relation Name Age of Onset    Cervical cancer Mother      Heart disease Mother  76    Heart attack Father  82    Colon cancer Father      Cancer Sister      No Known Problems Brother      Cancer Sister      No Known Problems Brother      No Known Problems Brother      No Known Problems Brother      No Known Problems Brother         Social History     Socioeconomic History    Marital status:     Number of children: 4   Tobacco Use    Smoking status: Never    Smokeless tobacco: Never   Substance and Sexual Activity    Alcohol use: Yes     Comment: rarely    Drug use: Never       Review of patient's allergies indicates:   Allergen Reactions    Adhesive tape-silicones Other (See Comments)     welps and skin breakdown        Current Outpatient Medications   Medication Instructions    amLODIPine (NORVASC) 10 mg, Oral, Daily    cyclobenzaprine (FLEXERIL) 10 mg, Oral, 3 times daily PRN    diazePAM (VALIUM) 10 mg, Oral, Once as needed    FLUoxetine 20 mg, Oral, Daily    glimepiride (AMARYL) 4 mg, Oral, Daily, Take one tablet daily.    levothyroxine (SYNTHROID) 100 MCG tablet 1 tablet on an empty stomach in the morning    lisinopriL-hydrochlorothiazide (PRINZIDE,ZESTORETIC) 20-25 mg Tab 1 tablet, Oral, Daily    metFORMIN (GLUCOPHAGE) 1,000 mg, Oral, 2 times daily, Take one tablet by mouth twice daily.    oxyCODONE-acetaminophen (PERCOCET)  mg per tablet 1 tablet, Every 6 hours PRN    OZEMPIC 0.5 mg,  "Subcutaneous, Every 7 days    pantoprazole (PROTONIX) 40 mg, Oral, Daily    rosuvastatin (CRESTOR) 5 mg, Oral, After dinner          Review of Systems   Constitutional:  Negative for chills, fever and weight loss.   HENT:  Negative for congestion, hearing loss, nosebleeds and tinnitus.    Eyes:  Negative for blurred vision, double vision and photophobia.   Respiratory:  Negative for cough, shortness of breath and wheezing.    Cardiovascular:  Negative for chest pain, palpitations and leg swelling.   Gastrointestinal:  Negative for constipation, diarrhea, nausea and vomiting.   Genitourinary:  Negative for dysuria, frequency and urgency.   Musculoskeletal:  Positive for back pain, joint pain (right hip pain) and neck pain. Negative for falls.   Skin:  Negative for itching and rash.   Neurological:  Positive for tingling, weakness and headaches. Negative for dizziness, tremors, sensory change, speech change, seizures and loss of consciousness.   Psychiatric/Behavioral:  Negative for depression, hallucinations and memory loss. The patient is not nervous/anxious.          OBJECTIVE:     Physical Exam    Visit Vitals  /80 (BP Location: Left arm, Patient Position: Sitting)   Pulse 60   Resp 16   Ht 6' 1" (1.854 m)   Wt 134.3 kg (296 lb)   BMI 39.05 kg/m²        General:  Pleasant, Obese, Well-groomed.    Cardiovascular:  Heart has regular rate and rhythm.    Lungs:  Breathing is quiet, non-lablored    Musculoskeletal:  Incision: well healed.  ROM is Decreased secondary to pain  Slightly limited range of motion with rotation to the left-greater-than-right as well as cervical extension and flexion.    Neurological:  Oriented to Person, Place, Time   Speech: normal  Memory, cognition, and affect are appropriate.  Pupils are equal, round, and reactive to light.  Extraocular movements are intact.  Movements of facial expression are intact and symmetric.  Muscle strength against resistance:   Right Left   Deltoid (C5) 5/5 " 5/5   Biceps (C5/6) 5/5 5/5   Brachioradialis (C5/6) 5/5 5/5   Triceps (C7) 5/5 5/5   Wrist extension (C7) 5/5 5/5   Finger abduction (C8) 5/5 5/5    5/5 5/5   Sensation is intact to primary modalities in bilateral upper and lower extremities.    Reflexes:  Negative Babinski, Clonus, Wiseman, Tinel's, and Phalen's bilaterally.  Positive Tinel's: Left  Gait is normal  Coordination is normal.    Imaging:  All pertinent neuroimaging independently reviewed. Discussed these findings in detail with the patient.    Updated x-rays of the cervical spine reveal stable postoperative changes with left laminoplasty from C3-C7.  Straightening of normal cervical lordosis with no abnormal motion on extension or flexion views.    ASSESSMENT:       ICD-10-CM ICD-9-CM   1. Claustrophobia  F40.240 300.29   2. Migraine without aura and without status migrainosus, not intractable  G43.009 346.10   3. Cervical spondylosis with myelopathy and radiculopathy  M47.12 721.1    M47.22 723.4   4. Bilateral carpal tunnel syndrome  G56.03 354.0     PLAN:   1. Migraine without aura and without status migrainosus, not intractable  - MRI Brain Without Contrast; Future  - Ambulatory referral/consult to Neurology; Future    2. Cervical spondylosis with myelopathy and radiculopathy  - Ambulatory Referral/Consult to Physical Therapy/Occupational Therapy; Future    3. Bilateral carpal tunnel syndrome  - WRIST BRACE FOR HOME USE    4. Claustrophobia (Primary)  - diazePAM (VALIUM) 10 MG Tab; Take 1 tablet (10 mg total) by mouth 1 (one) time if needed.  Dispense: 2 tablet; Refill: 0      Eren Page returns today reporting continued stiffness in his neck as well as numbness and weakness into left greater than right upper extremities.  He also has been struggling with significant frequent headaches with associated light sensitivity, nausea and vomiting.  I did take the time to review the patient's recent as well as previous x-rays with he and his wife  in clinic today.  At this time I am recommending he returned to physical therapy for her cervical symptoms to hopefully provide him some relief of his decreased range of motion and stiffness.  I am recommending the patient begin utilization of cock-up wrist splints nightly which were provided to him today in clinic.  I would also like the patient to be evaluated with Neurology therefore med moving forward with an MRI of the brain in prelude to this consultation.  The patient has been provided some Valium as he is claustrophobic.  I will notify the patient with any profoundly worrisome findings once this MRIs available for my review.  We will plan to see the patient back in clinic on an as-needed basis going forward.  He was advised to notify us with any concerns or questions prior to further consultation.  This plan was discussed with the patient and his wife and they are in agreement to move forward.      Follow up if symptoms worsen or fail to improve.    E/M Level Based On Time:   15 minutes spent on reviewing chart, which includes interpreting lab results and diagnostic tests.   30 minutes spent with the patient performing a history and physical exam, counseling or educating the patient/caregiver, prescribing medications, ordering labwork/diagnostic tests, or placing referrals.   0 minutes spent collaborating plan of care with physician.   5 minutes spent documenting all relevant clinical informationin the electronic health record.     Total Time Spent: 50 minutes           OTIS Avery    Disclaimer:  This note is prepared using voice recognition software and as such is likely to have errors despite attempts at proofreading. Please contact me for questions.

## 2025-01-13 ENCOUNTER — OFFICE VISIT (OUTPATIENT)
Dept: NEUROSURGERY | Facility: CLINIC | Age: 65
End: 2025-01-13
Payer: COMMERCIAL

## 2025-01-13 ENCOUNTER — HOSPITAL ENCOUNTER (OUTPATIENT)
Dept: RADIOLOGY | Facility: HOSPITAL | Age: 65
Discharge: HOME | End: 2025-01-13
Attending: NURSE PRACTITIONER
Payer: COMMERCIAL

## 2025-01-13 VITALS
HEART RATE: 60 BPM | DIASTOLIC BLOOD PRESSURE: 80 MMHG | BODY MASS INDEX: 39.23 KG/M2 | RESPIRATION RATE: 16 BRPM | HEIGHT: 73 IN | WEIGHT: 296 LBS | SYSTOLIC BLOOD PRESSURE: 131 MMHG

## 2025-01-13 DIAGNOSIS — G56.03 BILATERAL CARPAL TUNNEL SYNDROME: ICD-10-CM

## 2025-01-13 DIAGNOSIS — M47.12 CERVICAL SPONDYLOSIS WITH MYELOPATHY AND RADICULOPATHY: ICD-10-CM

## 2025-01-13 DIAGNOSIS — M47.22 CERVICAL SPONDYLOSIS WITH MYELOPATHY AND RADICULOPATHY: ICD-10-CM

## 2025-01-13 DIAGNOSIS — F40.240 CLAUSTROPHOBIA: Primary | ICD-10-CM

## 2025-01-13 DIAGNOSIS — G43.009 MIGRAINE WITHOUT AURA AND WITHOUT STATUS MIGRAINOSUS, NOT INTRACTABLE: ICD-10-CM

## 2025-01-13 PROBLEM — T81.30XA WOUND DEHISCENCE: Status: RESOLVED | Noted: 2024-02-24 | Resolved: 2025-01-13

## 2025-01-13 PROBLEM — K29.00 ACUTE SUPERFICIAL GASTRITIS WITHOUT HEMORRHAGE: Status: RESOLVED | Noted: 2022-05-05 | Resolved: 2025-01-13

## 2025-01-13 PROBLEM — E78.2 MIXED HYPERLIPIDEMIA: Status: ACTIVE | Noted: 2025-01-13

## 2025-01-13 PROBLEM — Z00.00 WELLNESS EXAMINATION: Status: ACTIVE | Noted: 2025-01-13

## 2025-01-13 PROCEDURE — 4010F ACE/ARB THERAPY RXD/TAKEN: CPT | Mod: CPTII,,, | Performed by: NURSE PRACTITIONER

## 2025-01-13 PROCEDURE — 1125F AMNT PAIN NOTED PAIN PRSNT: CPT | Mod: CPTII,,, | Performed by: NURSE PRACTITIONER

## 2025-01-13 PROCEDURE — 99215 OFFICE O/P EST HI 40 MIN: CPT | Mod: ,,, | Performed by: NURSE PRACTITIONER

## 2025-01-13 PROCEDURE — 3079F DIAST BP 80-89 MM HG: CPT | Mod: CPTII,,, | Performed by: NURSE PRACTITIONER

## 2025-01-13 PROCEDURE — 3008F BODY MASS INDEX DOCD: CPT | Mod: CPTII,,, | Performed by: NURSE PRACTITIONER

## 2025-01-13 PROCEDURE — 3288F FALL RISK ASSESSMENT DOCD: CPT | Mod: CPTII,,, | Performed by: NURSE PRACTITIONER

## 2025-01-13 PROCEDURE — 72052 X-RAY EXAM NECK SPINE 6/>VWS: CPT | Mod: TC

## 2025-01-13 PROCEDURE — 1160F RVW MEDS BY RX/DR IN RCRD: CPT | Mod: CPTII,,, | Performed by: NURSE PRACTITIONER

## 2025-01-13 PROCEDURE — 1159F MED LIST DOCD IN RCRD: CPT | Mod: CPTII,,, | Performed by: NURSE PRACTITIONER

## 2025-01-13 PROCEDURE — 1100F PTFALLS ASSESS-DOCD GE2>/YR: CPT | Mod: CPTII,,, | Performed by: NURSE PRACTITIONER

## 2025-01-13 PROCEDURE — 3075F SYST BP GE 130 - 139MM HG: CPT | Mod: CPTII,,, | Performed by: NURSE PRACTITIONER

## 2025-01-13 RX ORDER — DIAZEPAM 10 MG/1
10 TABLET ORAL ONCE AS NEEDED
Qty: 2 TABLET | Refills: 0 | Status: SHIPPED | OUTPATIENT
Start: 2025-01-13 | End: 2025-01-30 | Stop reason: SDUPTHER

## 2025-01-28 ENCOUNTER — TELEPHONE (OUTPATIENT)
Dept: NEUROSURGERY | Facility: CLINIC | Age: 65
End: 2025-01-28
Payer: COMMERCIAL

## 2025-01-28 ENCOUNTER — HOSPITAL ENCOUNTER (OUTPATIENT)
Dept: RADIOLOGY | Facility: HOSPITAL | Age: 65
Discharge: HOME | End: 2025-01-28
Attending: NURSE PRACTITIONER
Payer: COMMERCIAL

## 2025-01-28 DIAGNOSIS — G43.009 MIGRAINE WITHOUT AURA AND WITHOUT STATUS MIGRAINOSUS, NOT INTRACTABLE: ICD-10-CM

## 2025-01-28 NOTE — TELEPHONE ENCOUNTER
Spoke with patient and he explained that his neck is so stiff that he is unable to properly fit for the MRI machine. He was told that he needs one that is round instead of one that is flat in order to fit. We rescheduled his MRI at [facility] for 2/5 at 5:15. I explained to the patient that if for some reason they are unable to accommodate him we will contact him.          Pt called this afternoon to get his MRI rescheduled to another location that can accommodate him in a standing or upright position due to the stiffness and pain in his neck. Please contact pt to reschedule MRI at another facility.    Eren Page [phone]   Kade Mtz hours ago (1:17 PM)

## 2025-01-30 ENCOUNTER — TELEPHONE (OUTPATIENT)
Dept: NEUROSURGERY | Facility: CLINIC | Age: 65
End: 2025-01-30
Payer: COMMERCIAL

## 2025-01-30 DIAGNOSIS — F40.240 CLAUSTROPHOBIA: ICD-10-CM

## 2025-01-30 RX ORDER — DIAZEPAM 10 MG/1
10 TABLET ORAL ONCE AS NEEDED
Qty: 2 TABLET | Refills: 0 | Status: SHIPPED | OUTPATIENT
Start: 2025-01-30

## 2025-01-30 NOTE — TELEPHONE ENCOUNTER
Spoke with [facility] and confirmed that the the MRI machine should be ok for the patient and that they are able to work with his neck. Returned patient's call and let him know that he should not have a problem. Patient is asking if we are able to give him something prior to the MRI for his claustrophobia.

## 2025-01-31 ENCOUNTER — PATIENT MESSAGE (OUTPATIENT)
Dept: NEUROSURGERY | Facility: CLINIC | Age: 65
End: 2025-01-31
Payer: COMMERCIAL

## 2025-02-19 ENCOUNTER — OFFICE VISIT (OUTPATIENT)
Dept: NEUROLOGY | Facility: CLINIC | Age: 65
End: 2025-02-19
Payer: COMMERCIAL

## 2025-02-19 VITALS
WEIGHT: 280 LBS | BODY MASS INDEX: 37.11 KG/M2 | HEIGHT: 73 IN | SYSTOLIC BLOOD PRESSURE: 124 MMHG | DIASTOLIC BLOOD PRESSURE: 72 MMHG

## 2025-02-19 DIAGNOSIS — G43.009 MIGRAINE WITHOUT AURA AND WITHOUT STATUS MIGRAINOSUS, NOT INTRACTABLE: Primary | ICD-10-CM

## 2025-02-19 DIAGNOSIS — F51.04 CHRONIC INSOMNIA: ICD-10-CM

## 2025-02-19 DIAGNOSIS — G47.33 OSA ON CPAP: Chronic | ICD-10-CM

## 2025-02-19 DIAGNOSIS — M54.2 CERVICALGIA: ICD-10-CM

## 2025-02-19 RX ORDER — AMITRIPTYLINE HYDROCHLORIDE 25 MG/1
25 TABLET, FILM COATED ORAL NIGHTLY
Qty: 30 TABLET | Refills: 11 | Status: SHIPPED | OUTPATIENT
Start: 2025-02-19 | End: 2026-02-19

## 2025-02-19 RX ORDER — ESZOPICLONE 3 MG/1
3 TABLET, FILM COATED ORAL NIGHTLY
COMMUNITY
Start: 2025-02-10

## 2025-02-19 NOTE — PROGRESS NOTES
"Subjective:      @Patient ID: Eren Page is a 64 y.o. male.    Chief Complaint/referral reason/HPI:   Chief Complaint   Patient presents with    Migraine     NP ref by Dr. Ema Corley for eval migraines was daily had back surgery found tear in dura after surgery     migraines reduced to every other week caused nausea vomiting and uncontrolled loose stool pain level 10 during migraine location back of neck to front of head into eyes sensitivity to light would use dark shades but wasn't effective lasting a length of time during the day relief ice pack temporarily fallen 4 times since last year takes aspirin for migraine           Medardo STUART hx comments:  "I was having migr daily   was better   then having HA's again    sometimes it's get bad makes me sick to my stomach  migra freq once to twice per week   this going on couple of months       See also 'facesheet' under media for handwritten notes     Review of Systems                Retired, worked as:    Drives           -------------------------------------    Arthritis    Back pain    Balance problems    Cervical spondylosis with radiculopathy    Claudication    Depression    Diabetes mellitus    Dizziness    Dorsalgia    Dyslipidemia    Dysphagia    GERD (gastroesophageal reflux disease)    Headache    History of chest pain    History of kidney stones    Hypertension    Hypothyroidism    Insomnia    Irregular heart beat    Obesity    Palpitations    Right hip pain    Right leg pain    Right leg weakness    Shoulder pain    Sleep apnea    uses CPAP    SOB (shortness of breath)    Spinal stenosis of lumbar region, unspecified whether neurogenic claudication present    Wears dentures    FULL    Wellness examination     ..  Current Outpatient Medications   Medication Instructions    amLODIPine (NORVASC) 10 mg, Oral, Daily    cyclobenzaprine (FLEXERIL) 10 mg, Oral, 3 times daily PRN    diazePAM (VALIUM) 10 mg, Oral, Once as needed    eszopiclone (LUNESTA) " "3 mg, Nightly    FLUoxetine 20 mg, Oral, Daily    glimepiride (AMARYL) 4 mg, Oral, Daily, Take one tablet daily.    levothyroxine (SYNTHROID) 100 MCG tablet 1 tablet on an empty stomach in the morning    lisinopriL-hydrochlorothiazide (PRINZIDE,ZESTORETIC) 20-25 mg Tab 1 tablet, Oral, Daily    metFORMIN (GLUCOPHAGE) 1,000 mg, Oral, 2 times daily, Take one tablet by mouth twice daily.    oxyCODONE-acetaminophen (PERCOCET)  mg per tablet 1 tablet, Every 6 hours PRN    OZEMPIC 0.5 mg, Subcutaneous, Every 7 days    pantoprazole (PROTONIX) 40 mg, Oral, Daily    rosuvastatin (CRESTOR) 5 mg, Oral, After dinner      Lunesta at night (sometimes)       Objective:      Exam:   Visit Vitals  /72 (BP Location: Right arm, Patient Position: Sitting)   Ht 6' 1" (1.854 m)   Wt 127 kg (280 lb)   BMI 36.94 kg/m²     General Exam  If Accompanied, by__       body habitus_ Body mass index is 36.94 kg/m².    Neurological:  Exam comments:  CN's: VF EOM's ok   Speech: ok   Motor: seems ok   Coord: nonfocal   Reflexes: nil   Plantars: flat   Sensation: cannot feel vib in knees or ankles or feet   Gait: unassisted           Neuroimaging:   Images and imaging reports reviewed. Rads summary: neg   My comments: agree ok but wo contrast     Only films I have access to post op neck is plain films      preop c spine MRI reviewed     Labs:  cr 0.8 on 2.5.2025    New Patient; Complexity of Data:[x] High  [] Moderate  Risks:[] High   [x] Moderate  MDM:[x] High   [] Moderate         Assessment/Plan:         ICD-10-CM ICD-9-CM   1. Migraine without aura and without status migrainosus, not intractable  G43.009 346.10   2. GRAYSON on CPAP  G47.33 327.23   3. Chronic insomnia  F51.04 780.52   4. Cervicalgia  M54.2 723.1   [place]ayson on cpap         Other Comments / Follow Up:        Medications Ordered This Encounter   Medications    amitriptyline (ELAVIL) 25 MG tablet     Sig: Take 1 tablet (25 mg total) by mouth every evening.     Dispense:  30 tablet "     Refill:  11      Orders Placed This Encounter   Procedures    CT Head W Wo Contrast    CT Cervical Spine W Wo Contrast    Virtual visit 4-6 wk     MD BERNICE YeeA FAAN, [facility]  Neuroscience [place] Medical Director; [facility]

## 2025-03-21 ENCOUNTER — TELEPHONE (OUTPATIENT)
Dept: PHARMACY | Facility: CLINIC | Age: 65
End: 2025-03-21
Payer: COMMERCIAL

## 2025-03-21 NOTE — TELEPHONE ENCOUNTER
[facility]/Population Health Chart Review & Patient Outreach Details For Medication Adherence Project    Reason for Outreach Encounter: 3rd Party payor non-compliance report ([employer], [employer], [employer], etc)  2.  Patient Outreach Method: Reviewed patient chart   3.   Medication in question:    Hyperlipidemia Medications              rosuvastatin (CRESTOR) 5 MG tablet Take 1 tablet (5 mg total) by mouth after dinner.                  rosuvastatin  last filled  1/13 for 90 day supply      4.  Reviewed and or Updates Made To: Patient Chart  5. Outreach Outcomes and/or actions taken: Patient filled medication and is on track to be adherent  Additional Notes:

## 2025-03-27 ENCOUNTER — RESULTS FOLLOW-UP (OUTPATIENT)
Dept: NEUROLOGY | Facility: CLINIC | Age: 65
End: 2025-03-27

## 2025-04-03 ENCOUNTER — OFFICE VISIT (OUTPATIENT)
Dept: NEUROLOGY | Facility: CLINIC | Age: 65
End: 2025-04-03
Payer: COMMERCIAL

## 2025-04-03 DIAGNOSIS — F51.04 CHRONIC INSOMNIA: ICD-10-CM

## 2025-04-03 DIAGNOSIS — I10 PRIMARY HYPERTENSION: ICD-10-CM

## 2025-04-03 DIAGNOSIS — G44.89 OTHER HEADACHE SYNDROME: Primary | ICD-10-CM

## 2025-04-03 RX ORDER — AMITRIPTYLINE HYDROCHLORIDE 25 MG/1
25 TABLET, FILM COATED ORAL NIGHTLY
Qty: 30 TABLET | Refills: 11 | Status: SHIPPED | OUTPATIENT
Start: 2025-04-03 | End: 2026-04-03

## 2025-04-03 NOTE — PROGRESS NOTES
This is a telemedicine note. See bottom of note for boilerplate elements.     Eren Page is a 65 y.o. male seen today via telemedicine visit.   Subjective:    Patient ID: Eren Page is a 65 y.o. male.  Chief Complaint: virtual follow up for dx or symptoms: HAs    HPI:         did not get Rx for amitriptyline   Had CT head Agree mild atrophy; no worrisome or acute features   C sp ct reviewed; no addn comments     Current Outpatient Medications   Medication Instructions    amitriptyline (ELAVIL) 25 mg, Oral, Nightly    amLODIPine (NORVASC) 10 mg, Oral, Daily    eszopiclone (LUNESTA) 3 mg, Nightly    FLUoxetine 20 mg, Oral, Daily    glimepiride (AMARYL) 4 mg, Oral, Daily, Take one tablet daily.    levothyroxine (SYNTHROID) 100 MCG tablet 1 tablet on an empty stomach in the morning    lisinopriL-hydrochlorothiazide (PRINZIDE,ZESTORETIC) 20-25 mg Tab 1 tablet, Oral, Daily    metFORMIN (GLUCOPHAGE) 1,000 mg, Oral, 2 times daily, Take one tablet by mouth twice daily.    oxyCODONE-acetaminophen (PERCOCET)  mg per tablet 1 tablet, Every 6 hours PRN    pantoprazole (PROTONIX) 40 mg, Oral, Daily    rosuvastatin (CRESTOR) 5 mg, Oral, After dinner        Objective:      Exam Limited due to telemedicine restrictions.  There were no vitals taken for this visit.  if accompanied, by:_ wife   Speech: ok     Neuroimaging:  Images and imaging reports reviewed.  My comments:    CT head Agree mild atrophy; no worrisome or acute features   C sp ct reviewed; no addn comments   Post surgical ch    Labs:    meds:      MDM:      [] High   [x] Moderate   Assessment/Plan:     Problem List Items Addressed This Visit          Neuro    Other headache syndrome - Primary       Cardiac/Vascular    HTN (hypertension) (Chronic)    Overview   Recommendations:   -follow a low sodium diet (less than 2 grams/day)  -exercise 30 minutes per day for 5 days per week  -medication compliance, contact office with any side effects  -routine home  blood pressure monitoring  -blood pressure goal <140/90    Hypertension Medications               amLODIPine (NORVASC) 10 MG tablet Take 10 mg by mouth once daily.    lisinopriL-hydrochlorothiazide (PRINZIDE,ZESTORETIC) 20-25 mg Tab Take 1 tablet by mouth once daily.                    Other    Chronic insomnia       Other comments/ follow up:      Medications Ordered This Encounter   Medications    amitriptyline (ELAVIL) 25 MG tablet     Sig: Take 1 tablet (25 mg total) by mouth every evening.     Dispense:  30 tablet     Refill:  11   Stop fluoxetine   Amitriptyline 25mg nightly     F to f tiffani cortés 3wk       Video Time Documentation:  Spent 10 minutes with patient over video then phone bc was getting audio feedback    Total time this visit:   15   minutes    This is a telemedicine note.   Patient was treated using telemedicine, real time audio and video, according to [facility] protocols. This visit is not recorded.  The patient participated in the visit at a non-[facility] location selected by the patient, [facility].   I am licensed in [place] where the patient stated they are located.

## 2025-04-29 ENCOUNTER — OFFICE VISIT (OUTPATIENT)
Dept: NEUROLOGY | Facility: CLINIC | Age: 65
End: 2025-04-29
Payer: COMMERCIAL

## 2025-04-29 VITALS
DIASTOLIC BLOOD PRESSURE: 90 MMHG | BODY MASS INDEX: 37.11 KG/M2 | HEIGHT: 73 IN | WEIGHT: 280 LBS | SYSTOLIC BLOOD PRESSURE: 120 MMHG

## 2025-04-29 DIAGNOSIS — G44.89 OTHER HEADACHE SYNDROME: ICD-10-CM

## 2025-04-29 DIAGNOSIS — F51.04 CHRONIC INSOMNIA: ICD-10-CM

## 2025-04-29 DIAGNOSIS — G43.009 MIGRAINE WITHOUT AURA AND WITHOUT STATUS MIGRAINOSUS, NOT INTRACTABLE: Primary | ICD-10-CM

## 2025-04-29 PROCEDURE — 1159F MED LIST DOCD IN RCRD: CPT | Mod: CPTII,S$GLB,,

## 2025-04-29 PROCEDURE — 3079F DIAST BP 80-89 MM HG: CPT | Mod: CPTII,S$GLB,,

## 2025-04-29 PROCEDURE — 3008F BODY MASS INDEX DOCD: CPT | Mod: CPTII,S$GLB,,

## 2025-04-29 PROCEDURE — 3074F SYST BP LT 130 MM HG: CPT | Mod: CPTII,S$GLB,,

## 2025-04-29 PROCEDURE — 99999 PR PBB SHADOW E&M-EST. PATIENT-LVL III: CPT | Mod: PBBFAC,,,

## 2025-04-29 PROCEDURE — 4010F ACE/ARB THERAPY RXD/TAKEN: CPT | Mod: CPTII,S$GLB,,

## 2025-04-29 PROCEDURE — 1160F RVW MEDS BY RX/DR IN RCRD: CPT | Mod: CPTII,S$GLB,,

## 2025-04-29 PROCEDURE — 99214 OFFICE O/P EST MOD 30 MIN: CPT | Mod: S$GLB,,,

## 2025-04-29 RX ORDER — AMITRIPTYLINE HYDROCHLORIDE 25 MG/1
50 TABLET, FILM COATED ORAL NIGHTLY
Qty: 60 TABLET | Refills: 11 | Status: SHIPPED | OUTPATIENT
Start: 2025-04-29 | End: 2026-04-29

## 2025-04-29 NOTE — PROGRESS NOTES
Neurology    Established Headache Patient  SUBJECTIVE:  Patient ID: Eren Page is a 65 y.o. male.    Chief Complaint: F/U-MIGRAINE    History of Present Illness:    Pt here with wife for migraine f/u. Started amitriptyline 25mg qhs since last visit. Reports the amitriptyline is helping some. Migraines are still present but much less severe. Still having migraines daily.     Of note he takes tylenol, aleve or advil-migraine on a daily basis, sometimes twice daily. Which helps only minimally.     Migraines start at base of neck and move to the top of head. Associated with eye pain and blurry vision. Recently visited with ophthalmology and got a new glasses prescription. Was also told he has dry eyes and rx'ed Xiidra which seems to help the blurred vision.     Not sleeping well at night. Wears CPAP qhs. Occasionally naps during the day. Has EDS. Diff falling and staying asleep. Stopped the Lunesta 2.2 side effects-- it left a copper taste in his mouth.    Review of Systems - as per HPI, otherwise pertinent systems review is negative.            Past Medical History:   Diagnosis Date    Arthritis     Back pain     Balance problems     Cervical spondylosis with radiculopathy     Claudication     Depression     Diabetes mellitus     Dizziness     Dorsalgia     Dyslipidemia     Dysphagia 04/2022    GERD (gastroesophageal reflux disease)     Headache     History of chest pain     History of kidney stones     Hypertension     Hypothyroidism     Insomnia     Irregular heart beat     Obesity     Palpitations     Right hip pain     Right leg pain     Right leg weakness     Shoulder pain     Sleep apnea     uses CPAP    SOB (shortness of breath)     Spinal stenosis of lumbar region, unspecified whether neurogenic claudication present     Wears dentures     FULL    Wellness examination 1/13/2025       Past Surgical History:   Procedure Laterality Date    APPENDECTOMY      BACK SURGERY  2000    L4-5 and L5-S1 ALIF with  pedicle screws.    CHOLECYSTOTOMY      COLONOSCOPY      DECOMPRESSION OF LUMBAR SPINE USING MINIMALLY INVASIVE TECHNIQUE Bilateral 06/13/2023    L2-3, L3-4 decompression.  Dr. Barclay    FORAMINOTOMY N/A 9/14/2023    Procedure: FORAMINOTOMY, SPINE;  Surgeon: Renny Barclay MD;  Location: [facility];  Service: Neurosurgery;  Laterality: N/A;  C3-7 laminoplasty, then anterior removal of C2-7 osteophytes (posterior then flip and remove anterior osteophytes)    INCISION AND DRAINAGE, LOWER EXTREMITY Right 2/24/2024    Procedure: INCISION AND DRAINAGE, LOWER EXTREMITY;  Surgeon: Luis Coleman DO;  Location: [facility] OR;  Service: Orthopedics;  Laterality: Right;  lateral bean bag wash stuff    LAMINOPLASTY N/A 9/14/2023    Procedure: LAMINOPLASTY;  Surgeon: Renny Barclay MD;  Location: [facility] OR;  Service: Neurosurgery;  Laterality: N/A;  C3-7 laminoplasty, then anterior removal of C2-7 osteophytes (posterior then flip and remove anterior osteophytes)  NTI  Owen w/ Adriana  //  XX    TONSILLECTOMY      TOTAL KNEE ARTHROPLASTY Bilateral     VASECTOMY       Social History     Socioeconomic History    Marital status:     Number of children: 4   Tobacco Use    Smoking status: Never    Smokeless tobacco: Never   Substance and Sexual Activity    Alcohol use: Yes     Comment: rarely    Drug use: Never       Review of patient's allergies indicates:   Allergen Reactions    Adhesive tape-silicones Other (See Comments)     welps and skin breakdown       Current Medications:  Current Outpatient Medications   Medication Instructions    amitriptyline (ELAVIL) 50 mg, Oral, Nightly    amLODIPine (NORVASC) 10 mg, Oral, Daily    glimepiride (AMARYL) 4 mg, Oral, Daily, Take one tablet daily.    levothyroxine (SYNTHROID) 100 MCG tablet TAKE 1 TABLET BY MOUTH ON AN EMPTY STOMACH IN THE MORNING    lisinopriL-hydrochlorothiazide (PRINZIDE,ZESTORETIC) 20-25 mg Tab 1 tablet, Oral, Daily    metFORMIN (GLUCOPHAGE) 1,000 mg, Oral, 2 times daily,  "Take one tablet by mouth twice daily.    oxyCODONE-acetaminophen (PERCOCET)  mg per tablet 1 tablet, Every 6 hours PRN    pantoprazole (PROTONIX) 40 mg, Oral    rosuvastatin (CRESTOR) 5 mg, Oral, After dinner       OBJECTIVE:  Vitals:  Visit Vitals  BP (P) 120/88   Ht 6' 1" (1.854 m)   Wt 127 kg (280 lb)   BMI 36.94 kg/m²       Physical Exam:  Constitutional: he appears well-developed and well-nourished. he is well groomed.    Accompanied by: wife  Head: Normocephalic and atraumatic  Resp:  Pulmonary effort is normal. Clear breath sounds   Cardiac: RRR, no murmurs  Musculoskeletal: Normal range of motion.   Skin: Skin is warm and dry.  Psychiatric: Normal mood and affect.     Neuro exam:    The patient is alert and oriented   Normal attention and concentration  Speech is clear and fluent   Pupils are equal, round, and reactive to light.    Visual fields are full to confrontation testing  EOMs intact; no nystagmus, ptosis, gaze preference or deviation     No facial asymmetry   CN 8: hearing is grossly normal  Motor: grossly normal; no lateralizing deficits; slight mobility restriction of shoulders (chronic)  Gait: unassisted     Review of Data:   Prior notes reviewed     Imagin25 CT Head W Wo Contrast    Rads: No acute intracranial abnormality identified. Mild frontal and parietal lobe atrophy. Presumed sebaceous cyst in the scalp posteriorly.     CT c-spine w wo contrast: rads: Extensive postsurgical changes in the cervical spine. C7-T1 mild spinal canal stenosis, with mild right intervertebral foraminal stenosis.    ASSESSMENT /PLAN:    1. Migraine without aura and without status migrainosus, not intractable  2. Other headache syndrome  3. Chronic insomnia    -Increase amitriptyline to 50mg nightly.   -Likely component of medication overuse headache as he's taking ibuprofen/acetaminophen on a daily basis. Advised pt to cut back on the OTC medication use to no more than 10 days per month.   - Advised " to increase his water intake, avoid dehydration and limit caffeine intake.   -He takes magnesium qam, advised to switch it to qpm.   -Sleep hygiene measures discussed.     Medications Ordered This Encounter   Medications    amitriptyline (ELAVIL) 25 MG tablet     Sig: Take 2 tablets (50 mg total) by mouth every evening.     Dispense:  60 tablet     Refill:  11     Follow up 6 weeks. In addition to their scheduled follow up, the patient has also been instructed to follow up on as needed basis.   Should headaches change in nature, increase in frequency, or if abortive therapy loses efficacy, they are to contact our office and move up appointment.      Questions and concerns were sought and answered to the patient's stated verbal satisfaction.    The patient verbalizes understanding and agreement with the above stated treatment plan.   Items discussed include acute and/or chronic neurological, sleep, or other issues and their attendant differential diagnoses.  Potential for additional testing, treatment options, and prognosis also discussed.  Dr. Armando Higginbotham available during encounter.    Aye Higgins, MSN, APRN, FNP-C  [facility]  [phone]

## 2025-05-01 ENCOUNTER — TELEPHONE (OUTPATIENT)
Dept: NEUROLOGY | Facility: CLINIC | Age: 65
End: 2025-05-01
Payer: COMMERCIAL

## 2025-05-01 NOTE — TELEPHONE ENCOUNTER
Pt wanted to make sure we sent his amitriptyline to his recommended pharmacy.    S/w pharmacy and they informed me they did recv his medication. They will fill his medication on 5/3/2025.

## 2025-05-29 ENCOUNTER — OFFICE VISIT (OUTPATIENT)
Dept: NEUROLOGY | Facility: CLINIC | Age: 65
End: 2025-05-29
Payer: COMMERCIAL

## 2025-05-29 VITALS
BODY MASS INDEX: 39.76 KG/M2 | HEIGHT: 73 IN | SYSTOLIC BLOOD PRESSURE: 118 MMHG | DIASTOLIC BLOOD PRESSURE: 80 MMHG | WEIGHT: 300 LBS

## 2025-05-29 DIAGNOSIS — G44.89 OTHER HEADACHE SYNDROME: ICD-10-CM

## 2025-05-29 DIAGNOSIS — G43.009 MIGRAINE WITHOUT AURA AND WITHOUT STATUS MIGRAINOSUS, NOT INTRACTABLE: Primary | ICD-10-CM

## 2025-05-29 DIAGNOSIS — F51.04 CHRONIC INSOMNIA: ICD-10-CM

## 2025-05-29 PROCEDURE — 1101F PT FALLS ASSESS-DOCD LE1/YR: CPT | Mod: CPTII,S$GLB,,

## 2025-05-29 PROCEDURE — 3008F BODY MASS INDEX DOCD: CPT | Mod: CPTII,S$GLB,,

## 2025-05-29 PROCEDURE — 3079F DIAST BP 80-89 MM HG: CPT | Mod: CPTII,S$GLB,,

## 2025-05-29 PROCEDURE — 99999 PR PBB SHADOW E&M-EST. PATIENT-LVL III: CPT | Mod: PBBFAC,,,

## 2025-05-29 PROCEDURE — 3074F SYST BP LT 130 MM HG: CPT | Mod: CPTII,S$GLB,,

## 2025-05-29 PROCEDURE — 4010F ACE/ARB THERAPY RXD/TAKEN: CPT | Mod: CPTII,S$GLB,,

## 2025-05-29 PROCEDURE — 1160F RVW MEDS BY RX/DR IN RCRD: CPT | Mod: CPTII,S$GLB,,

## 2025-05-29 PROCEDURE — 1159F MED LIST DOCD IN RCRD: CPT | Mod: CPTII,S$GLB,,

## 2025-05-29 PROCEDURE — 99215 OFFICE O/P EST HI 40 MIN: CPT | Mod: S$GLB,,,

## 2025-05-29 PROCEDURE — 3288F FALL RISK ASSESSMENT DOCD: CPT | Mod: CPTII,S$GLB,,

## 2025-05-29 RX ORDER — SUMATRIPTAN SUCCINATE 50 MG/1
50 TABLET ORAL
Qty: 9 TABLET | Refills: 5 | Status: SHIPPED | OUTPATIENT
Start: 2025-05-29 | End: 2025-06-28

## 2025-05-29 RX ORDER — AMITRIPTYLINE HYDROCHLORIDE 25 MG/1
TABLET, FILM COATED ORAL
Qty: 120 TABLET | Refills: 11 | Status: SHIPPED | OUTPATIENT
Start: 2025-05-29

## 2025-05-29 NOTE — PROGRESS NOTES
"  Neurology Clinic    Established Headache Patient  SUBJECTIVE:  Patient ID: Eren Page is a 65 y.o. male.    Chief Complaint: 1 month migraine f/u    History of Present Illness:    Pt presents with wife for 4 week migraine follow up.    Since the increase of amitriptyline migraines have decreased in frequency and severity. Gets on average 10-15 migraines per month, previously had them daily. Takes tylenol migraine for the headaches, unsure how often but wife says at least 10x per month. He's had three migraines this week.     Reports blurry vision before HA occurs. Migraines starts at neck then radiate to head/eyes, describes pain as burning "like when you pull a muscle". Reports photo/phonophobia, nausea, no vomiting.     Current Preventative:              Amitriptyline 50mg qhs                Current Rescue:              Tylenol migraine    Prior preventative:  Fluoxetine    Positives in bold: Hx of Kidney Stones, asthma, GI bleed, osteoporosis, CAD/MI, CVA/TIA, DM       Review of Systems - as per HPI, otherwise pertinent systems review is negative.          Past Medical History:   Diagnosis Date    Arthritis     Back pain     Balance problems     Cervical spondylosis with radiculopathy     Claudication     Depression     Diabetes mellitus     Dizziness     Dorsalgia     Dyslipidemia     Dysphagia 04/2022    GERD (gastroesophageal reflux disease)     Headache     History of chest pain     History of kidney stones     Hypertension     Hypothyroidism     Insomnia     Irregular heart beat     Obesity     Palpitations     Right hip pain     Right leg pain     Right leg weakness     Shoulder pain     Sleep apnea     uses CPAP    SOB (shortness of breath)     Spinal stenosis of lumbar region, unspecified whether neurogenic claudication present     Wears dentures     FULL    Wellness examination 1/13/2025       Past Surgical History:   Procedure Laterality Date    APPENDECTOMY      BACK SURGERY  2000    L4-5 and " L5-S1 ALIF with pedicle screws.    CHOLECYSTOTOMY      COLONOSCOPY      DECOMPRESSION OF LUMBAR SPINE USING MINIMALLY INVASIVE TECHNIQUE Bilateral 06/13/2023    L2-3, L3-4 decompression.  Dr. Barclay    FORAMINOTOMY N/A 9/14/2023    Procedure: FORAMINOTOMY, SPINE;  Surgeon: Renny Barclay MD;  Location: [facility];  Service: Neurosurgery;  Laterality: N/A;  C3-7 laminoplasty, then anterior removal of C2-7 osteophytes (posterior then flip and remove anterior osteophytes)    INCISION AND DRAINAGE, LOWER EXTREMITY Right 2/24/2024    Procedure: INCISION AND DRAINAGE, LOWER EXTREMITY;  Surgeon: Luis Coleman DO;  Location: [facility] OR;  Service: Orthopedics;  Laterality: Right;  lateral bean bag wash stuff    LAMINOPLASTY N/A 9/14/2023    Procedure: LAMINOPLASTY;  Surgeon: Renny Barclay MD;  Location: [facility] OR;  Service: Neurosurgery;  Laterality: N/A;  C3-7 laminoplasty, then anterior removal of C2-7 osteophytes (posterior then flip and remove anterior osteophytes)  NTI  Owen w/ Adriana  //  XX    TONSILLECTOMY      TOTAL KNEE ARTHROPLASTY Bilateral     VASECTOMY         Family History   Problem Relation Name Age of Onset    Cervical cancer Mother      Heart disease Mother  76    Heart attack Father  82    Colon cancer Father      Cancer Sister      No Known Problems Brother      Cancer Sister      No Known Problems Brother      No Known Problems Brother      No Known Problems Brother      No Known Problems Brother         Social History     Socioeconomic History    Marital status:     Number of children: 4   Tobacco Use    Smoking status: Never    Smokeless tobacco: Never   Substance and Sexual Activity    Alcohol use: Yes     Comment: occasionally    Drug use: Never       Review of patient's allergies indicates:   Allergen Reactions    Adhesive tape-silicones Other (See Comments)     welps and skin breakdown       Current Medications:  Medication List with Changes/Refills   New Medications    SUMATRIPTAN (IMITREX) 50  "MG TABLET    Take 1 tablet (50 mg total) by mouth as needed for Migraine (can repeat dose in 2 hours if needed; do not exceed 2 doses in 24 hours or 4 doses in one week.).   Current Medications    AMLODIPINE (NORVASC) 10 MG TABLET    Take 1 tablet (10 mg total) by mouth once daily.    GLIMEPIRIDE (AMARYL) 4 MG TABLET    Take 1 tablet (4 mg total) by mouth once daily. Take one tablet daily.    LEVOTHYROXINE (SYNTHROID) 100 MCG TABLET    TAKE 1 TABLET BY MOUTH ON AN EMPTY STOMACH IN THE MORNING    LISINOPRIL-HYDROCHLOROTHIAZIDE (PRINZIDE,ZESTORETIC) 20-25 MG TAB    Take 1 tablet by mouth once daily.    METFORMIN (GLUCOPHAGE) 1000 MG TABLET    Take 1 tablet (1,000 mg total) by mouth 2 (two) times a day. Take one tablet by mouth twice daily.    OXYCODONE-ACETAMINOPHEN (PERCOCET)  MG PER TABLET    Take 1 tablet by mouth every 6 (six) hours as needed.    PANTOPRAZOLE (PROTONIX) 40 MG TABLET    Take 1 tablet by mouth once daily    ROSUVASTATIN (CRESTOR) 5 MG TABLET    Take 1 tablet (5 mg total) by mouth after dinner.   Changed and/or Refilled Medications    Modified Medication Previous Medication    AMITRIPTYLINE (ELAVIL) 25 MG TABLET amitriptyline (ELAVIL) 25 MG tablet       Take 3 tabs (75mg) by mouth nightly x2 weeks. If after 2 weeks migraines not controlled increase to 4 tabs (100mg) qhs    Take 2 tablets (50 mg total) by mouth every evening.       OBJECTIVE:  Vitals:  Visit Vitals  /80 (BP Location: Left arm, Patient Position: Sitting)   Ht 6' 1" (1.854 m)   Wt 136.1 kg (300 lb)   BMI 39.58 kg/m²       Physical Exam:  Constitutional: he appears well-developed and well-nourished. he is well groomed. overweight  Accompanied by: wife  Head: Normocephalic and atraumatic  Resp:  Pulmonary effort is normal. Clear breath sounds   Cardiac: RRR, no murmurs  Musculoskeletal: Normal range of motion.   Skin: Skin is warm and dry.  Psychiatric: Normal mood and affect.     Neuro exam:    The patient is alert and " oriented   Normal attention and concentration  Speech is clear and fluent   Pupils are equal, round, and reactive to light.    Visual fields are full to confrontation testing  EOMs intact; no nystagmus, ptosis, gaze preference or deviation     No facial asymmetry   CN 8: hearing is grossly normal  Motor: grossly normal; no lateralizing deficits; slight mobility restriction of shoulders (chronic)  Gait: unassisted       Review of Data:   Prior notes reviewed       Imagin/27/25 CT Head W Wo Contrast     Rads: No acute intracranial abnormality identified. Mild frontal and parietal lobe atrophy. Presumed sebaceous cyst in the scalp posteriorly.      CT c-spine w wo contrast: rads: Extensive postsurgical changes in the cervical spine. C7-T1 mild spinal canal stenosis, with mild right intervertebral foraminal stenosis.       ASSESSMENT /PLAN:    1. Migraine without aura and without status migrainosus, not intractable  2. Other headache syndrome  3. Chronic insomnia    Migraines have decreased in frequency in severity.     Discussed/considered adding topirimate but ultimately decided to hold off d/t remote hx kidney stones.     Will increase amitriptyline dose-- Amitriptyline 25mg, take 3 tabs qhs x2weeks. If after 2 weeks migraines not well controlled, increase to 4 tabs (100mg qhs).     Will add sumatriptan as abortive. Administration instructions, anticipated response and possible side effects of new medication discussed. Pt denies questions at this time. Instructed pt to call with any questions or concerns    Advised pt to cut back on the OTC medication use to no more than 10 days per month.     We discussed lifestyle modifications including healthy regular eating, avoiding dehydration, avoiding more than 1 caffeinated product a day, establishing routine sleep patterns at least 8 hours of sleep and avoiding oversleeping and working on relaxation and stressors.     Sleep hygiene discussed.     Medications  Ordered This Encounter   Medications    amitriptyline (ELAVIL) 25 MG tablet     Sig: Take 3 tabs (75mg) by mouth nightly x2 weeks. If after 2 weeks migraines not controlled increase to 4 tabs (100mg) qhs     Dispense:  120 tablet     Refill:  11    sumatriptan (IMITREX) 50 MG tablet     Sig: Take 1 tablet (50 mg total) by mouth as needed for Migraine (can repeat dose in 2 hours if needed; do not exceed 2 doses in 24 hours or 4 doses in one week.).     Dispense:  9 tablet     Refill:  5     Follow up 6 weeks. In addition to their scheduled follow up, the patient has also been instructed to follow up on as needed basis.   Should headaches change in nature, increase in frequency, or if abortive therapy loses efficacy, they are to contact our office and move up appointment.      Questions and concerns were sought and answered to the patient's stated verbal satisfaction.    The patient verbalizes understanding and agreement with the above stated treatment plan.   Items discussed include acute and/or chronic neurological, sleep, or other issues and their attendant differential diagnoses.  Potential for additional testing, treatment options, and prognosis also discussed.  Dr. Armando Higginbotham available during encounter.    I spent a total of 44 minutes on the day of the visit.  This includes face to face time and non-face to face time preparing to see the patient (eg, review of tests), obtaining and/or reviewing separately obtained history, documenting clinical information in the electronic or other health record, independently interpreting results and communicating results to the patient/family/caregiver, or care coordinator.    Aye Higgins, MSN, APRN, FNP-C  [facility]  [phone]

## 2025-06-25 PROBLEM — E11.9 TYPE 2 DIABETES MELLITUS WITHOUT COMPLICATION, WITHOUT LONG-TERM CURRENT USE OF INSULIN: Chronic | Status: RESOLVED | Noted: 2024-02-02 | Resolved: 2025-06-25

## 2025-06-25 PROBLEM — E11.65 TYPE 2 DIABETES MELLITUS WITH HYPERGLYCEMIA, WITHOUT LONG-TERM CURRENT USE OF INSULIN: Status: ACTIVE | Noted: 2025-06-25

## 2025-07-15 ENCOUNTER — OFFICE VISIT (OUTPATIENT)
Dept: NEUROLOGY | Facility: CLINIC | Age: 65
End: 2025-07-15
Payer: COMMERCIAL

## 2025-07-15 VITALS
DIASTOLIC BLOOD PRESSURE: 82 MMHG | WEIGHT: 290 LBS | SYSTOLIC BLOOD PRESSURE: 126 MMHG | HEIGHT: 73 IN | BODY MASS INDEX: 38.43 KG/M2

## 2025-07-15 DIAGNOSIS — G43.009 MIGRAINE WITHOUT AURA AND WITHOUT STATUS MIGRAINOSUS, NOT INTRACTABLE: Primary | ICD-10-CM

## 2025-07-15 DIAGNOSIS — F51.04 CHRONIC INSOMNIA: ICD-10-CM

## 2025-07-15 DIAGNOSIS — G44.89 OTHER HEADACHE SYNDROME: ICD-10-CM

## 2025-07-15 PROCEDURE — 3288F FALL RISK ASSESSMENT DOCD: CPT | Mod: CPTII,S$GLB,,

## 2025-07-15 PROCEDURE — 3074F SYST BP LT 130 MM HG: CPT | Mod: CPTII,S$GLB,,

## 2025-07-15 PROCEDURE — 4010F ACE/ARB THERAPY RXD/TAKEN: CPT | Mod: CPTII,S$GLB,,

## 2025-07-15 PROCEDURE — 99999 PR PBB SHADOW E&M-EST. PATIENT-LVL III: CPT | Mod: PBBFAC,,,

## 2025-07-15 PROCEDURE — 1159F MED LIST DOCD IN RCRD: CPT | Mod: CPTII,S$GLB,,

## 2025-07-15 PROCEDURE — 1100F PTFALLS ASSESS-DOCD GE2>/YR: CPT | Mod: CPTII,S$GLB,,

## 2025-07-15 PROCEDURE — 1160F RVW MEDS BY RX/DR IN RCRD: CPT | Mod: CPTII,S$GLB,,

## 2025-07-15 PROCEDURE — 99214 OFFICE O/P EST MOD 30 MIN: CPT | Mod: S$GLB,,,

## 2025-07-15 PROCEDURE — 3046F HEMOGLOBIN A1C LEVEL >9.0%: CPT | Mod: CPTII,S$GLB,,

## 2025-07-15 PROCEDURE — 3079F DIAST BP 80-89 MM HG: CPT | Mod: CPTII,S$GLB,,

## 2025-07-15 PROCEDURE — 3008F BODY MASS INDEX DOCD: CPT | Mod: CPTII,S$GLB,,

## 2025-07-15 RX ORDER — HYDROCODONE BITARTRATE AND ACETAMINOPHEN 7.5; 325 MG/1; MG/1
1 TABLET ORAL EVERY 6 HOURS PRN
COMMUNITY
Start: 2025-06-12

## 2025-07-15 NOTE — PROGRESS NOTES
"  Neurology Clinic    Established Headache Patient  SUBJECTIVE:  Patient ID: Eren Page is a 65 y.o. male.    History of Present Illness    CHIEF COMPLAINT:  Patient presents today for follow up of migraines.    MIGRAINES:  He reports experiencing three severe migraines since last visit, each requiring Sumatriptan which effectively resolves migraine symptoms when taken at onset. Migraines associated with n/v and phono-photophobia. He also experiences regular headaches occurring approximately every other day, which are manageable with Tylenol. When these headaches occur, he sits in a quiet, dark environment until symptoms resolve. Advil and Excedrin may also provide some relief, though less effectively than Sumatriptan for severe migraines. Currently taking amitriptyline 75mg qhs, tolerating well without intolerable side effects. Finds this medication regimen is efficacious for the migraines.     SLEEP:  He reports chronic insomnia with significant sleep disturbances. He has difficulty falling asleep, typically not settling down until 3-4am due to racing mind and inability to "shut down" mentally. His sleep pattern is currently reversed, only able to sleep during daytime hours from approximately 3-4am until 3pm. He experiences light, fragmented sleep, never reaching deep sleep and remains aware of environmental sounds. He mentions that when he stays awake all day he's able to sleep better at night. In addition to the amitriptyline 75mg qhs he takes magnesium 250mg.     Current Preventative:              Amitriptyline 75mg qhs                Current Rescue:              Sumatriptan 50mg                 Prior Preventative:              fluoxetine    Positives in bold: Hx of Kidney Stones     Review of Systems - as per HPI, otherwise pertinent systems review is negative.          Past Medical History:   Diagnosis Date    Arthritis     Back pain     Balance problems     Cervical spondylosis with radiculopathy     " Claudication     Depression     Diabetes mellitus     Dizziness     Dorsalgia     Dyslipidemia     Dysphagia 04/2022    GERD (gastroesophageal reflux disease)     Headache     History of chest pain     History of kidney stones     Hypertension     Hypothyroidism     Insomnia     Irregular heart beat     Obesity     Palpitations     Right hip pain     Right leg pain     Right leg weakness     Shoulder pain     Sleep apnea     uses CPAP    SOB (shortness of breath)     Spinal stenosis of lumbar region, unspecified whether neurogenic claudication present     Wears dentures     FULL    Wellness examination 1/13/2025       Past Surgical History:   Procedure Laterality Date    APPENDECTOMY  1969    BACK SURGERY  2000    L4-5 and L5-S1 ALIF with pedicle screws.    CHOLECYSTECTOMY  1998    CHOLECYSTOTOMY      COLONOSCOPY      DECOMPRESSION OF LUMBAR SPINE USING MINIMALLY INVASIVE TECHNIQUE Bilateral 06/13/2023    L2-3, L3-4 decompression.  Dr. Barclay    FORAMINOTOMY N/A 09/14/2023    Procedure: FORAMINOTOMY, SPINE;  Surgeon: Renny Barclay MD;  Location: [facility];  Service: Neurosurgery;  Laterality: N/A;  C3-7 laminoplasty, then anterior removal of C2-7 osteophytes (posterior then flip and remove anterior osteophytes)    INCISION AND DRAINAGE, LOWER EXTREMITY Right 02/24/2024    Procedure: INCISION AND DRAINAGE, LOWER EXTREMITY;  Surgeon: Luis Coleman DO;  Location: [facility] OR;  Service: Orthopedics;  Laterality: Right;  lateral bean bag wash stuff    JOINT REPLACEMENT  2019    LAMINOPLASTY N/A 09/14/2023    Procedure: LAMINOPLASTY;  Surgeon: Renny Barclay MD;  Location: [facility];  Service: Neurosurgery;  Laterality: N/A;  C3-7 laminoplasty, then anterior removal of C2-7 osteophytes (posterior then flip and remove anterior osteophytes)  NTI  Owen w/ Adriana  //  XX    SPINE SURGERY  1993    TONSILLECTOMY      TOTAL KNEE ARTHROPLASTY Bilateral     VASECTOMY         Family History   Problem Relation Name Age of Onset     Cervical cancer Mother Kristi     Heart disease Mother Kristi 76    Cancer Mother Kristi         Cervical    Diabetes Mother Kristi         Type 2    Hypertension Mother Kristi     Heart attack Father Ray 82    Colon cancer Father Ray     Cancer Father Ray         Colon    Hypertension Father Ray     Cancer Sister Aline         Breast    No Known Problems Brother      Cancer Sister Evette         Ovarian, melanoma    No Known Problems Brother      No Known Problems Brother      No Known Problems Brother      No Known Problems Brother      Cancer Maternal Grandfather Wade Velez         Throat cancer    Cancer Paternal Grandfather Tari Page         Brain cancer    Cancer Paternal Grandmother Opal         Breast cancer    Cancer Paternal Aunt Dunia         Breast cancer    Cancer Paternal Aunt Marilee         Lung cancer    Diabetes Daughter Citlalli         Type 1       Social History     Socioeconomic History    Marital status:     Number of children: 4   Tobacco Use    Smoking status: Never    Smokeless tobacco: Never   Substance and Sexual Activity    Alcohol use: Not Currently     Alcohol/week: 2.0 standard drinks of alcohol     Types: 2 Drinks containing 0.5 oz of alcohol per week     Comment: occasionally    Drug use: Never    Sexual activity: Yes     Partners: Female     Birth control/protection: Post-menopausal       Review of patient's allergies indicates:   Allergen Reactions    Adhesive tape-silicones Other (See Comments)     welps and skin breakdown       Current Medications:  Current Outpatient Medications   Medication Instructions    amitriptyline (ELAVIL) 25 MG tablet Take 3 tabs (75mg) by mouth nightly x2 weeks. If after 2 weeks migraines not controlled increase to 4 tabs (100mg) qhs    amLODIPine (NORVASC) 10 mg, Oral, Daily    cyanocobalamin 2,500 mcg, Daily    glimepiride (AMARYL) 4 mg, Oral, Daily, Take one tablet daily.    HYDROcodone-acetaminophen (NORCO) 7.5-325 mg per tablet 1 tablet, Every  "6 hours PRN    levothyroxine (SYNTHROID) 100 MCG tablet TAKE 1 TABLET BY MOUTH ON AN EMPTY STOMACH IN THE MORNING    lisinopriL-hydrochlorothiazide (PRINZIDE,ZESTORETIC) 20-25 mg Tab 1 tablet, Oral, Daily    MAG GLYCINATE 250 mg, Daily    metFORMIN (GLUCOPHAGE) 1,000 mg, Oral, 2 times daily, Take one tablet by mouth twice daily.    MOUNJARO 2.5 mg, Subcutaneous, Every 7 days    pantoprazole (PROTONIX) 40 mg, Oral    rosuvastatin (CRESTOR) 5 mg, Oral, After dinner    sumatriptan (IMITREX) 50 mg, Oral, As needed (PRN)         OBJECTIVE:  Vitals:  Visit Vitals  /82 (BP Location: Left arm, Patient Position: Sitting)   Ht 6' 1" (1.854 m)   Wt 131.5 kg (290 lb)   BMI 38.26 kg/m²       Physical Exam:  Constitutional: he appears well-developed and well-nourished. he is well groomed. overweight  Accompanied by: wife  Head: Normocephalic and atraumatic  Cardiac: RRR, no murmurs  Musculoskeletal: Normal range of motion.   Skin: Skin is warm and dry.  Psychiatric: Normal mood and affect.     Neuro exam:    The patient is alert and oriented   Normal attention and concentration  Speech is clear and fluent   Pupils are equal, round, and reactive to light.    Visual fields are full to confrontation testing  EOMs intact; no nystagmus, ptosis, gaze preference or deviation     No facial asymmetry   CN 8: hearing is grossly normal  Motor: grossly normal; no lateralizing deficits; slight mobility restriction of shoulders (chronic)  Gait: unassisted     Review of Data:   Prior notes reviewed       Imagin/6/25: MRI brain without: No acute pathology or significant intracranial abnormality. Cervical spine degenerative and postoperative change.          25 CT Head W Wo Contrast     Rads: No acute intracranial abnormality identified. Mild frontal and parietal lobe atrophy. Presumed sebaceous cyst in the scalp posteriorly.        CT c-spine w wo contrast: rads: Extensive postsurgical changes in the cervical spine. C7-T1 mild " spinal canal stenosis, with mild right intervertebral foraminal stenosis.    ASSESSMENT /PLAN:    G43.009 Migraine without aura and without status migrainosus, not intractable  G44.89 Other headache syndrome  F51.04 Chronic insomnia    IMPRESSION:  - Migraine frequency decreased to 3 significant episodes since last visit, with Sumatriptan effectively managing these episodes.  - Current treatment plan with Amitriptyline and Sumatriptan is adequate; no additional medications needed at this time.  - Sleep hygiene identified as a major factor contributing to insomnia and potentially affecting migraine management.  - Chronic pain considered as a contributing factor to insomnia.  - Amitriptyline serves multiple purposes: migraine prevention, insomnia management, and potential mood stabilization.    MIGRAINE WITHOUT AURA AND WITHOUT STATUS MIGRAINOSUS, NOT INTRACTABLE:  - Explained importance of proper sleep hygiene for managing migraines.  - Educated about medication overuse headaches and recommended limiting OTC pain medications to no more than 10 times per month.  - Continue Amitriptyline 75 mg qhs.  - Continue Sumatriptan as needed for acute migraines    CHRONIC INSOMNIA:  - Explained importance of proper sleep hygiene for managing insomnia.  - Discussed impact of screen time before bed on melatonin production and sleep quality.  - Explained relationship between daytime napping and nighttime sleep difficulties.  - Recommend implementing improved sleep hygiene practices: Avoid screens at least 1 hour before bedtime, Engage in relaxing activities before bed, Avoid heavy meals close to bedtime. Patient to avoid napping during the day to improve nighttime sleep.  - Continue Amitriptyline 75 mg at night.  - Continue Magnesium 250-500 mg at night.          Follow up 6 months. In addition to their scheduled follow up, the patient has also been instructed to follow up on as needed basis.   Should headaches change in nature,  increase in frequency, or if abortive therapy loses efficacy, they are to contact our office and move up appointment.      Questions and concerns were sought and answered to the patient's stated verbal satisfaction.    The patient verbalizes understanding and agreement with the above stated treatment plan.   Items discussed include acute and/or chronic neurological, sleep, or other issues and their attendant differential diagnoses.  Potential for additional testing, treatment options, and prognosis also discussed.  Dr. Armando Higginbotham available during encounter.    This note was generated with the assistance of ambient listening technology. Verbal consent was obtained by the patient and accompanying visitor(s) for the recording of patient appointment to facilitate this note. I attest to having reviewed and edited the generated note for accuracy, though some syntax or spelling errors may persist. Please contact the author of this note for any clarification.    Aye Higgins, MSN, APRN, FNP-C  [facility]  [phone]

## 2025-08-07 ENCOUNTER — HOSPITAL ENCOUNTER (OUTPATIENT)
Dept: RADIOLOGY | Facility: CLINIC | Age: 65
Discharge: HOME | End: 2025-08-07
Attending: FAMILY MEDICINE
Payer: COMMERCIAL

## 2025-08-07 ENCOUNTER — OFFICE VISIT (OUTPATIENT)
Dept: ORTHOPEDICS | Facility: CLINIC | Age: 65
End: 2025-08-07
Payer: COMMERCIAL

## 2025-08-07 VITALS
DIASTOLIC BLOOD PRESSURE: 73 MMHG | HEIGHT: 73 IN | SYSTOLIC BLOOD PRESSURE: 115 MMHG | BODY MASS INDEX: 40.29 KG/M2 | HEART RATE: 66 BPM | WEIGHT: 304 LBS

## 2025-08-07 DIAGNOSIS — M19.012 OSTEOARTHRITIS OF GLENOHUMERAL JOINT, LEFT: Primary | ICD-10-CM

## 2025-08-07 DIAGNOSIS — M25.512 LEFT SHOULDER PAIN, UNSPECIFIED CHRONICITY: ICD-10-CM

## 2025-08-07 PROCEDURE — 3288F FALL RISK ASSESSMENT DOCD: CPT | Mod: CPTII,,, | Performed by: FAMILY MEDICINE

## 2025-08-07 PROCEDURE — 3074F SYST BP LT 130 MM HG: CPT | Mod: CPTII,,, | Performed by: FAMILY MEDICINE

## 2025-08-07 PROCEDURE — 99203 OFFICE O/P NEW LOW 30 MIN: CPT | Mod: ,,, | Performed by: FAMILY MEDICINE

## 2025-08-07 PROCEDURE — 73030 X-RAY EXAM OF SHOULDER: CPT | Mod: LT,,, | Performed by: FAMILY MEDICINE

## 2025-08-07 PROCEDURE — 1159F MED LIST DOCD IN RCRD: CPT | Mod: CPTII,,, | Performed by: FAMILY MEDICINE

## 2025-08-07 PROCEDURE — 3078F DIAST BP <80 MM HG: CPT | Mod: CPTII,,, | Performed by: FAMILY MEDICINE

## 2025-08-07 PROCEDURE — 1101F PT FALLS ASSESS-DOCD LE1/YR: CPT | Mod: CPTII,,, | Performed by: FAMILY MEDICINE

## 2025-08-07 PROCEDURE — 3008F BODY MASS INDEX DOCD: CPT | Mod: CPTII,,, | Performed by: FAMILY MEDICINE

## 2025-08-07 PROCEDURE — 3046F HEMOGLOBIN A1C LEVEL >9.0%: CPT | Mod: CPTII,,, | Performed by: FAMILY MEDICINE

## 2025-08-07 PROCEDURE — 4010F ACE/ARB THERAPY RXD/TAKEN: CPT | Mod: CPTII,,, | Performed by: FAMILY MEDICINE

## 2025-08-07 NOTE — PROGRESS NOTES
Sports Medicine [place]  Rufus Cerda MD    Patient ID: [id number]     Chief Complaint: Shoulder Pain (LT shoulder pain - Patient reports has been a issue for a while, recently has gotten worse the last 6 months to a year. Reports issues with ROM, constant aching pain in the shoulder that radiates, is worse at night. Reports some weakness to do pain.  )      History of Present Illness:     Eren Page is a 65 y.o. male here today for a sports medicine visit.     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 His left shoulder has a history of three dislocations during his athletic career, managed conservatively without surgery. He currently has severe limitations in shoulder mobility, stating he cannot raise his arm higher or reach across his body. This affects his daily activities, including difficulty with showering. He previously sought treatment from Dr. Taylor, receiving steroid injections that provided diminishing relief over time. His most recent visit resulted in a recommendation for surgery, but insurance denied coverage for the required MRI.    He reports arthritis in both knees, having undergone total knee replacements bilaterally, with the right knee being replaced twice. One knee has persistent swelling, and he reports feeling movement within the joint. He also mentions a hip replacement. Pain in both his knee and hip significantly limits his mobility, restricting his ability to walk more than 10-15 feet without needing to rest.    His daughter, a nurse, advised him to seek a second opinion before proceeding with hip surgery due to concerns about Dr. Taylor's practice.    His medical history is significant for diabetes, with a recent A1C of 9.6. He mentions being back on Ozempic and expects his A1C to improve. He also reports recent weight loss, from 355 lbs to 304 lbs (accounting for 5 lbs of shoes).    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    PREVIOUS TREATMENTS:  Patient received steroid injections in the left shoulder from Dr. Taylor, which initially provided relief for 6 months but became less effective over time. He attempted physical therapy for the shoulder, including exercises like using a hand bicycle, but it was ineffective and discontinued after 2 minutes due to pain.          Past Medical History:   Diagnosis Date    Arthritis     Back pain     Balance problems     Cervical spondylosis with radiculopathy     Claudication     Depression     Diabetes mellitus     Dizziness     Dorsalgia     Dyslipidemia     Dysphagia 04/2022    GERD (gastroesophageal reflux disease)     Headache     History of chest pain     History of kidney stones     Hypertension     Hypothyroidism     Insomnia     Irregular heart beat     Obesity     Palpitations     Right hip pain     Right leg pain     Right leg weakness     Shoulder pain     Sleep apnea     uses CPAP    SOB (shortness of breath)     Spinal stenosis of lumbar region, unspecified whether neurogenic claudication present     Wears dentures     FULL    Wellness examination 1/13/2025        Past Surgical History:   Procedure Laterality Date    APPENDECTOMY  1969    BACK SURGERY  2000    L4-5 and L5-S1 ALIF with pedicle screws.    CHOLECYSTECTOMY  1998    CHOLECYSTOTOMY      COLONOSCOPY      DECOMPRESSION OF LUMBAR SPINE USING MINIMALLY INVASIVE TECHNIQUE Bilateral 06/13/2023    L2-3, L3-4 decompression.  Dr. Barclay    FORAMINOTOMY N/A 09/14/2023    Procedure: FORAMINOTOMY, SPINE;  Surgeon: Renny Barclay MD;  Location: [facility];  Service: Neurosurgery;  Laterality: N/A;  C3-7 laminoplasty, then anterior removal of C2-7 osteophytes (posterior then flip and remove anterior osteophytes)    INCISION AND DRAINAGE, LOWER EXTREMITY Right 02/24/2024    Procedure: INCISION AND DRAINAGE, LOWER EXTREMITY;  Surgeon: Luis Coleman DO;  Location: [facility] OR;  Service: Orthopedics;  Laterality: Right;  lateral bean  bag wash stuff    JOINT REPLACEMENT  2019    LAMINOPLASTY N/A 09/14/2023    Procedure: LAMINOPLASTY;  Surgeon: Renny Barclay MD;  Location: [facility] OR;  Service: Neurosurgery;  Laterality: N/A;  C3-7 laminoplasty, then anterior removal of C2-7 osteophytes (posterior then flip and remove anterior osteophytes)  NTI  Owen w/ Adriana  //  XX    SPINE SURGERY  1993    TONSILLECTOMY      TOTAL KNEE ARTHROPLASTY Bilateral     VASECTOMY          Social History     Tobacco Use    Smoking status: Never    Smokeless tobacco: Never   Substance and Sexual Activity    Alcohol use: Not Currently     Alcohol/week: 2.0 standard drinks of alcohol     Types: 2 Drinks containing 0.5 oz of alcohol per week     Comment: occasionally    Drug use: Never    Sexual activity: Yes     Partners: Female     Birth control/protection: Post-menopausal        Current Outpatient Medications   Medication Instructions    amitriptyline (ELAVIL) 25 MG tablet Take 3 tabs (75mg) by mouth nightly x2 weeks. If after 2 weeks migraines not controlled increase to 4 tabs (100mg) qhs    amLODIPine (NORVASC) 10 mg, Oral, Daily    cyanocobalamin 2,500 mcg, Daily    glimepiride (AMARYL) 4 mg, Oral, Daily, Take one tablet daily.    HYDROcodone-acetaminophen (NORCO) 7.5-325 mg per tablet 1 tablet, Every 6 hours PRN    levothyroxine (SYNTHROID) 100 MCG tablet TAKE 1 TABLET BY MOUTH ON AN EMPTY STOMACH IN THE MORNING    lisinopriL-hydrochlorothiazide (PRINZIDE,ZESTORETIC) 20-25 mg Tab 1 tablet, Oral, Daily    MAG GLYCINATE 250 mg, Daily    metFORMIN (GLUCOPHAGE) 1,000 mg, Oral, 2 times daily    MOUNJARO 2.5 mg, Subcutaneous, Every 7 days    pantoprazole (PROTONIX) 40 mg, Oral    rosuvastatin (CRESTOR) 5 mg, Oral, After dinner    sumatriptan (IMITREX) 50 mg, Oral, As needed (PRN)       Review of patient's allergies indicates:   Allergen Reactions    Adhesive tape-silicones Other (See Comments)     welps and skin breakdown        Patient Care Team:  Arun An MD as  "PCP - General (Family Medicine)  Aron Steele MD as Consulting Physician (Cardiovascular Disease)  Huan Newsome MD as Consulting Physician (General Surgery)  Ede Granados MD (Orthopedic Surgery)     Review of Systems:     Review of Systems    12 point review of systems conducted, negative except as stated in the history of present illness. See HPI for details.    Objective:     Visit Vitals  /73 (BP Location: Right arm, Patient Position: Sitting)   Pulse 66   Ht 6' 1" (1.854 m)   Wt (!) 137.9 kg (304 lb)   BMI 40.11 kg/m²       Physical Exam     Hands Free Examination:  Patient is awake, alert and in no acute distress.  Respirations are non labored.  No abdominal distension is noted.  No visible rashes on exposed skin.  No lower extremity edema is present.    Left Shoulder:  Abduction 80  Flexion 80  Internal rotation reaches to the level of the left iliac crest  External rotation 30  Strength of all ranges of motion is 5/5 against resistance.  Provocative maneuvers:  Empty can test positive  [place] test positive  Richardson positive  Yocums positive  Cross arm positive  Speeds positive  Labral Crank unable to perform  Lift off Test negative  There is no pain with palpation of the AC joint.    Imaging Reviewed:   X-Rays of the left shoulder performed in office today show severe degenerative changes of the AC joint with hypertrophy, severe degenerative changes of the glenohumeral joint, no evidence of fracture or dislocation.    Assessment and Plan:     Left shoulder glenohumeral joint osteoarthritis  Cervical spondylosis with history of cervical spine laminoplasty  Type 2 Diabetes mellitus    -I discussed with the patient today that his most recent A1c of 9.6 significantly limits our therapeutic options for severe shoulder osteoarthritis.  -At this time I would recommend against a cortisone injection or consultation for total shoulder arthroplasty.  -I recommended that he work closely with " his primary care physician and recruits services of a dietitian, diabetes educator and continue injectable therapies to control his diabetes.  -He understands that if he can get his A1c below 7.5 we may consider a cortisone injection for pain relief but this is ultimately only a temporary solution and that he should pursue aggressive medial management to make himself a better candidate for shoulder replacement in the future.  -I will be happy to see him back when his diabetes is under better control.    Rufus Cerda MD  Primary Care Sports Medicine     This note was generated with the assistance of ambient listening technology. Verbal consent was obtained by the patient and accompanying visitor(s) for the recording of patient appointment to facilitate this note. I attest to having reviewed and edited the generated note for accuracy, though some syntax or spelling errors may persist. Please contact the author of this note for any clarification.

## (undated) DEVICE — CANNULA SUPERSOFT CO2 M AD 7FT

## (undated) DEVICE — ELECTRODE BLADE E-Z CLEAN 4IN

## (undated) DEVICE — PAD DERMAPROX XL 22X14X.5IN

## (undated) DEVICE — Device

## (undated) DEVICE — CONTAINER SPECIMEN SCREW 4OZ

## (undated) DEVICE — GLOVE PROTEXIS HYDROGEL SZ9

## (undated) DEVICE — KIT SURGICAL TURNOVER

## (undated) DEVICE — SUT VICRYL 2-0 8-18 CP-2

## (undated) DEVICE — KIT SURGIFLO HEMOSTATIC MATRIX

## (undated) DEVICE — SOL NACL .9P 500ML

## (undated) DEVICE — CONNECTOR TORRENT SCP OLYMPUS

## (undated) DEVICE — GLOVE PROTEXIS BLUE LATEX 8

## (undated) DEVICE — DRAPE C-ARM COVER EZ 36X28IN

## (undated) DEVICE — RESERVOIR JACKSON-PRATT 100CC

## (undated) DEVICE — FORCEP ENDOJAW OVL NDL 2X1550

## (undated) DEVICE — GLOVE PROTEXIS NEU-THERA SZ6

## (undated) DEVICE — GLOVE PROTEXIS BLUE LATEX 6.5

## (undated) DEVICE — GLOVE PROTEXIS HYDROGEL SZ6

## (undated) DEVICE — MARKER WRITESITE SKIN CHLRAPRP

## (undated) DEVICE — TUBING OXYGEN CONNECT BUBBLE

## (undated) DEVICE — MIDAS REX MR8

## (undated) DEVICE — SPONGE GAUZE 16PLY 4X4

## (undated) DEVICE — BURR MR8 MTCH HD FLUT 3MMX10CM

## (undated) DEVICE — SEE L#162208

## (undated) DEVICE — DRAPE EQUIP BTTN WALTERLORENZ

## (undated) DEVICE — SPONGE COTTON TRAY 4X4IN

## (undated) DEVICE — TRAY CATH FOL SIL URIMTR 16FR

## (undated) DEVICE — DRAPE TOP 53X102IN

## (undated) DEVICE — STAPLER SKIN PROXIMATE WIDE

## (undated) DEVICE — SPONGE NEURO 1/4X1/4

## (undated) DEVICE — SNARE SNAREMASTER OVAL 25MM

## (undated) DEVICE — TAPE CLOTH MEDIPORE SOFT 2X2YD

## (undated) DEVICE — SPONGE LAP 18X18 PREWASHED

## (undated) DEVICE — ELECTRODE BLD EXT 6.50 ST DISP

## (undated) DEVICE — NDL HYPO 22GX1 1/2 SYR 10ML LL

## (undated) DEVICE — TUBE SUCTION MEDI-VAC STERILE

## (undated) DEVICE — TUBE SUC UNIVERSAL .25XIN 6FT

## (undated) DEVICE — DRESSING TRANS 4X4 TEGADERM

## (undated) DEVICE — SUT BONE WAX 2.5 GRMS 12/BX

## (undated) DEVICE — ADHESIVE MASTISOL VIAL 48/BX

## (undated) DEVICE — DRESSING TELFA N ADH 3X8

## (undated) DEVICE — GAUZE SPONGE 4X4 12PLY

## (undated) DEVICE — POSITIONER HEAD ADULT

## (undated) DEVICE — SOL IRRI STRL WATER 1000ML

## (undated) DEVICE — ELECTRODE PATIENT RETURN DISP

## (undated) DEVICE — TIP SUCTION YANKAUER

## (undated) DEVICE — DISSECTOR SECTO CHERRY 3/8IN

## (undated) DEVICE — ELECTRODE REM POLYHESIVE II

## (undated) DEVICE — KIT VIA CUSTOM PROCEDURE

## (undated) DEVICE — DURAPREP SURG SCRUB 26ML

## (undated) DEVICE — IRRIGATION SET Y-TYPE TUR/BLAD

## (undated) DEVICE — DRAIN JACKSON PRATT TRCR 10FR

## (undated) DEVICE — DRAPE FLUID WARMER 44X44IN

## (undated) DEVICE — SPONGE DRY VIA GREEN

## (undated) DEVICE — GLOVE PROTEXIS PI SYN SURG 7.5

## (undated) DEVICE — GOWN SURGICAL BRTHBL XL

## (undated) DEVICE — SEALANT VISTASEAL FIBRIN 4ML

## (undated) DEVICE — GLOVE PROTEXIS PI SYN SURG 7

## (undated) DEVICE — BOWL STERILE LARGE 32OZ

## (undated) DEVICE — DRESSING ANTIMIC ISLAND 4X10IN

## (undated) DEVICE — TUBING IRR BIPOLAR CORD 12FT

## (undated) DEVICE — SYR SLIP TIP 60 CC DISP

## (undated) DEVICE — DRAPE STERI U-SHAPED 47X51IN

## (undated) DEVICE — DISH PETRI MED 3.5IN

## (undated) DEVICE — TOOL MR8 TELSCP MATCH 12CM 2.5

## (undated) DEVICE — ADHESIVE DERMABOND ADVANCED

## (undated) DEVICE — GLOVE PROTEXIS PI SYN SURG 6.0

## (undated) DEVICE — ELECTRODE REM PLYHSV RETURN 9

## (undated) DEVICE — DRAPE OPMI STERILE

## (undated) DEVICE — SPONGE SURGIFOAM 100 8.5X12X10

## (undated) DEVICE — BITE BLOCK ADULT JUMBO ENDO W/

## (undated) DEVICE — TUBE SUCTION FRAZIER VENT 10FR

## (undated) DEVICE — DRAPE ORTH SPLIT 77X108IN

## (undated) DEVICE — TRAP ETRAP POLYP 50 TRAY

## (undated) DEVICE — SOL NACL IRR 1000ML BTL

## (undated) DEVICE — SUT CTD VICRYL BR CR/CT-2

## (undated) DEVICE — SOL .9NACL PF 100 ML

## (undated) DEVICE — TOOL DISECT BALL MR8 10CM 2MM

## (undated) DEVICE — GLOVE PROTEXIS BLUE LATEX 7

## (undated) DEVICE — GOWN SMARTGOWN 3XL XLONG

## (undated) DEVICE — GLOVE PROTEXIS BLUE LATEX 9

## (undated) DEVICE — PILLOW HEAD REST

## (undated) DEVICE — SUT GORETEX CV-6 TTC-09 24IN

## (undated) DEVICE — BELLOW CANN HEMOBLAST 1.65GR

## (undated) DEVICE — ELECTRODE FOAM 535 TEARDROP

## (undated) DEVICE — DRAPE SURG W/TWL 17 5/8X23

## (undated) DEVICE — GLOVE PROTEXIS LTX MICRO  7.5

## (undated) DEVICE — GOWN SMARTSLEEVE AAMI LVL4 LG

## (undated) DEVICE — DRESSING XEROFORM NONADH 1X8IN

## (undated) DEVICE — LINER SUCTION CANNISTER REGUGA

## (undated) DEVICE — SUT STRATAFIX 4-0 30CM PS-2

## (undated) DEVICE — COVER HD BACK TABLE 6FT

## (undated) DEVICE — SUT VICRYL PLUS 0 CT-1 18IN

## (undated) DEVICE — SEALER AQUAMANTYS 2.3 BIPOLAR

## (undated) DEVICE — DEVICE CLSR PDS PLUS 1 CT 18IN

## (undated) DEVICE — SUT MCRYL PLUS 2-0 CT-1 36IN

## (undated) DEVICE — SUT VICRYL PLUS 3-0 SH 18IN

## (undated) DEVICE — KIT POS JACKSON TABLE NO HDRST

## (undated) DEVICE — ELECTRODE BLADE INSULATED 1 IN

## (undated) DEVICE — POSITIONER HEEL FOAM CONVOLTD

## (undated) DEVICE — KIT BIOGUARD AIR WTR SUC VALVE

## (undated) DEVICE — UNDERPAD DISPOSABLE 30X30IN

## (undated) DEVICE — GAUZE SPONGE XRAY 4X4

## (undated) DEVICE — SEE L#804

## (undated) DEVICE — BASIN EMESIS GRAPHITE 500ML